# Patient Record
Sex: FEMALE | Race: WHITE | Employment: UNEMPLOYED | URBAN - METROPOLITAN AREA
[De-identification: names, ages, dates, MRNs, and addresses within clinical notes are randomized per-mention and may not be internally consistent; named-entity substitution may affect disease eponyms.]

---

## 2017-08-20 ENCOUNTER — HOSPITAL ENCOUNTER (EMERGENCY)
Age: 22
Discharge: HOME | End: 2017-08-20
Attending: EMERGENCY MEDICINE
Payer: COMMERCIAL

## 2017-08-20 VITALS
DIASTOLIC BLOOD PRESSURE: 80 MMHG | WEIGHT: 157.4 LBS | HEART RATE: 85 BPM | RESPIRATION RATE: 18 BRPM | HEIGHT: 68 IN | BODY MASS INDEX: 23.86 KG/M2 | OXYGEN SATURATION: 100 % | TEMPERATURE: 98.7 F | SYSTOLIC BLOOD PRESSURE: 142 MMHG

## 2017-08-20 DIAGNOSIS — Z71.1 NO ABNORMALITY DETECTED ON MENTAL HEALTH ASSESSMENT: Primary | ICD-10-CM

## 2017-08-20 PROCEDURE — 75810000275 HC EMERGENCY DEPT VISIT NO LEVEL OF CARE

## 2017-08-20 PROCEDURE — 99284 EMERGENCY DEPT VISIT MOD MDM: CPT

## 2017-08-20 PROCEDURE — 90791 PSYCH DIAGNOSTIC EVALUATION: CPT
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
## 2017-08-21 NOTE — ED NOTES
MD reviewed discharge instructions with the patient. The patient verbalized understanding. Patient ambulated out of ED with [facility].  No complaints or concerns noted
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
## 2017-08-21 NOTE — DISCHARGE INSTRUCTIONS
We hope that we have addressed all of your medical concerns. The examination and treatment you received in the Emergency Department were for an emergent problem and were not intended as complete care. It is important that you follow up with your healthcare provider(s) for ongoing care. If your symptoms worsen or do not improve as expected, and you are unable to reach your usual health care provider(s), you should return to the Emergency Department. Today's healthcare is undergoing tremendous change, and patient satisfaction surveys are one of the many tools to assess the quality of medical care. You may receive a survey from the [employer] regarding your experience in the Emergency Department. I hope that your experience has been completely positive, particularly the medical care that I provided. As such, please participate in the survey; anything less than excellent does not meet my expectations or intentions. [facility] and [address] [place] [address] participate in nationally recognized quality of care measures. If your blood pressure is greater than 120/80, as reported below, we urge that you seek medical care to address the potential of high blood pressure, commonly known as hypertension. Hypertension can be hereditary or can be caused by certain medical conditions, pain, stress, or \"white coat syndrome. \"       Please make an appointment with your health care provider(s) for follow up of your Emergency Department visit. VITALS:   Patient Vitals for the past 8 hrs:   Temp Pulse Resp BP SpO2   08/20/17 2206 98.7 °F (37.1 °C) 85 18 142/80 100 %   08/20/17 1918 98.3 °F (36.8 °C) 84 18 (!) 145/94 100 %          Thank you for allowing us to provide you with medical care today. We realize that you have many choices for your emergency care needs. Please choose us in the future for any continued health care needs.       Dorothy Chapa Ritesh James M.D. [address] [facility],[address] [facility]: [phone]            No results found for this or any previous visit (from the past 24 hour(s)). No results found.

## 2017-08-31 ENCOUNTER — HOSPITAL ENCOUNTER (EMERGENCY)
Age: 22
Discharge: HOME | End: 2017-08-31
Attending: EMERGENCY MEDICINE
Payer: COMMERCIAL

## 2017-08-31 VITALS
DIASTOLIC BLOOD PRESSURE: 88 MMHG | OXYGEN SATURATION: 99 % | WEIGHT: 154 LBS | SYSTOLIC BLOOD PRESSURE: 144 MMHG | BODY MASS INDEX: 24.17 KG/M2 | HEIGHT: 67 IN | TEMPERATURE: 98 F | RESPIRATION RATE: 15 BRPM | HEART RATE: 82 BPM

## 2017-08-31 DIAGNOSIS — Z65.8 DOMESTIC PROBLEMS: Primary | ICD-10-CM

## 2017-08-31 PROCEDURE — 99283 EMERGENCY DEPT VISIT LOW MDM: CPT

## 2017-08-31 PROCEDURE — 90791 PSYCH DIAGNOSTIC EVALUATION: CPT

## 2017-08-31 NOTE — ED TRIAGE NOTES
\"My mother and the father of my two girls want me to come and have my brain evaluated. They will not let me have my children, they keep telling me I am insane and unstable. Here I am to proove once again that I am not crazy so I can have my children. \" Pt escorted to ED by [employer], called by pt's mother. Pt denies any s/sx. Pt reports Suicidal Ideation, no plans to act on thoughts. Denies HI.

## 2017-08-31 NOTE — ED NOTES
4:08 PM  Change of shift. Care of patient taken over from Laith Morgan MD; H&P reviewed, handoff complete to Dr. Carlos Florescal. Awaiting consultant and resources will be given to Pt.    4:36 PM  Case Management has seen Pt. Agrees with [facility] that Pt can be discharged. Pt is requesting to be discharged as well.
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
## 2017-08-31 NOTE — DISCHARGE INSTRUCTIONS
Codependency: Care Instructions  Your Care Instructions  Codependency happens when you are greatly affected by a loved one's or another family member's substance abuse or addiction. You may feel that you can control the actions of another person through your will power. Since this cannot be done, you feel hopeless, betrayed, or angry. Addiction is a disease. A person with an addiction is not choosing a drug or alcohol instead of friends or family. The person needs professional help to deal with the addiction. The best thing you can do is to help the person get the help he or she needs. Also, it is very important for you to take care of yourself. Making sure you get the understanding and respect you need will help you stay healthy in both physical and emotional ways. Follow-up care is a key part of your treatment and safety. Be sure to make and go to all appointments, and call your doctor if you are having problems. Its also a good idea to know your test results and keep a list of the medicines you take. How can you care for yourself at home? · Learn all you can about codependency. Being informed can help you deal with the problem. · Do not blame yourself for your family member's condition. · Find a counselor you like and trust. Talk openly and honestly about your problems. Be willing to make some changes. · Go to all counseling sessions. Do not skip any just because you are feeling better. · Talk to friends and family for emotional support. · Make a plan with all family members about how to take care of your loved one when symptoms of his or her addiction are bad. · Do not focus attention only on the family member who has the problem. · Get enough rest. When you are too tired, it can be hard to cope with even small problems. · Eat a healthy, balanced diet to help prevent illness. · Get at least 30 minutes of exercise on most days of the week. Walking is a good choice.  You also may want to do other activities, such as running, swimming, cycling, or playing tennis or team sports. Exercise can help you relieve stress and feel better. · Stay active. Try to do the things you usually enjoy, even if you do not feel like doing them. · Join a support group for family members, such as [employer]. Talking with other people who are going through the same things can help. When should you call for help? Watch closely for changes in your health, and be sure to contact your doctor if:  · You cannot concentrate or are easily confused. · You have trouble taking care of yourself. · You cannot go to your counseling sessions. · You feel sad or depressed. Where can you learn more? Go to http://katerine-brenna.info/. Enter D324 in the search box to learn more about \"Codependency: Care Instructions. \"  Current as of: July 26, 2016  Content Version: 11.3  © 3119-6300 [employer]. Care instructions adapted under license by [employer] (which disclaims liability or warranty for this information). If you have questions about a medical condition or this instruction, always ask your healthcare professional. [place] [address] any warranty or liability for your use of this information. We hope that we have addressed all of your medical concerns. The examination and treatment you received in the Emergency Department were for an emergent problem and were not intended as complete care. It is important that you follow up with your healthcare provider(s) for ongoing care. If your symptoms worsen or do not improve as expected, and you are unable to reach your usual health care provider(s), you should return to the Emergency Department. Today's healthcare is undergoing tremendous change, and patient satisfaction surveys are one of the many tools to assess the quality of medical care.   You may receive a survey from the [employer] regarding your experience in the Emergency Department. I hope that your experience has been completely positive, particularly the medical care that I provided. As such, please participate in the survey; anything less than excellent does not meet my expectations or intentions. [address] [facility] and [employer] participate in nationally recognized quality of care measures. If your blood pressure is greater than 120/80, as reported below, we urge that you seek medical care to address the potential of high blood pressure, commonly known as hypertension. Hypertension can be hereditary or can be caused by certain medical conditions, pain, stress, or \"white coat syndrome. \"       Please make an appointment with your health care provider(s) for follow up of your Emergency Department visit. VITALS:   Patient Vitals for the past 8 hrs:   Temp Pulse Resp BP SpO2   08/31/17 1259 98.5 °F (36.9 °C) 84 16 129/89 94 %          Thank you for allowing us to provide you with medical care today. We realize that you have many choices for your emergency care needs. Please choose us in the future for any continued health care needs. Kushal Mondragon [address], [address]: [phone]            No results found for this or any previous visit (from the past 24 hour(s)). No results found.
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
## 2017-09-15 ENCOUNTER — HOSPITAL ENCOUNTER (INPATIENT)
Age: 22
LOS: 1 days | Discharge: LEFT BEFORE DISCHARGE | DRG: 781 | End: 2017-09-16
Attending: PSYCHIATRY & NEUROLOGY | Admitting: PSYCHIATRY & NEUROLOGY
Payer: COMMERCIAL

## 2017-09-15 PROBLEM — F32.9 MAJOR DEPRESSION: Status: ACTIVE | Noted: 2017-09-15

## 2017-09-15 PROCEDURE — 65220000003 HC RM SEMIPRIVATE PSYCH

## 2017-09-15 RX ORDER — ESCITALOPRAM OXALATE 10 MG/1
5 TABLET ORAL DAILY
Status: ON HOLD | COMMUNITY
End: 2017-09-15

## 2017-09-15 RX ORDER — IBUPROFEN 400 MG/1
400 TABLET ORAL
Status: DISCONTINUED | OUTPATIENT
Start: 2017-09-15 | End: 2017-09-16 | Stop reason: HOSPADM

## 2017-09-15 RX ORDER — ESCITALOPRAM OXALATE 5 MG/1
5 TABLET ORAL DAILY
COMMUNITY
End: 2018-03-30

## 2017-09-15 RX ORDER — ADHESIVE BANDAGE
30 BANDAGE TOPICAL DAILY PRN
Status: DISCONTINUED | OUTPATIENT
Start: 2017-09-15 | End: 2017-09-16 | Stop reason: HOSPADM

## 2017-09-15 RX ORDER — IBUPROFEN 200 MG
1 TABLET ORAL
Status: DISCONTINUED | OUTPATIENT
Start: 2017-09-15 | End: 2017-09-16 | Stop reason: HOSPADM

## 2017-09-15 NOTE — IP AVS SNAPSHOT
[address] 
[phone] Patient: Anette Sanders MRN: [id number] NQM:[id number]/76/0443 Current Discharge Medication List  
  
ASK your doctor about these medications Dose & Instructions Dispensing Information Comments Morning Noon Evening Bedtime LEXAPRO 5 mg tablet Generic drug:  escitalopram oxalate Your last dose was: Your next dose is:    
   
   
 Dose:  5 mg Take 5 mg by mouth daily. Indications: ANXIETY WITH DEPRESSION Refills:  0

## 2017-09-15 NOTE — BH NOTES
TRANSFER - IN REPORT:    Verbal report received from Evin Pepe(name) on Joni Leiva  being received from [employer]) for routine progression of care      Report consisted of patients Situation, Background, Assessment and   Recommendations(SBAR). Information from the following report(s) ED Summary was reviewed with the receiving nurse. Opportunity for questions and clarification was provided. Assessment completed upon patients arrival to unit and care assumed.

## 2017-09-15 NOTE — IP AVS SNAPSHOT
[address] [place] [phone] Patient: Conner Cruz MRN: [id number] ZKY:[birth year] You are allergic to the following No active allergies Recent Documentation Weight Breastfeeding? BMI OB Status Smoking Status 70.6 kg No 24.37 kg/m2 Having regular periods Heavy Tobacco Smoker Emergency Contacts Name Discharge Info Relation Home Work Mobile None,None DECLINED CAREGIVER [4] Unknown [9] [phone] About your hospitalization You were admitted on:  September 15, 2017 You last received care in the:  [address] [place] [address] You were discharged on:  2017 Unit phone number:  [phone] Why you were hospitalized Your primary diagnosis was:  Not on File Your diagnoses also included: Major Depression Providers Seen During Your Hospitalizations Provider Role Specialty Primary office phone Joey Cook MD Attending Provider Psychiatry [phone] Your Primary Care Physician (PCP) Primary Care Physician Office Phone Office Fax NONE ** None ** ** None ** Follow-up Information Follow up With Details Comments Contact [facility] On 2017 Call Monday for intake appt : 9:30 am to 5:00 pm  [address] [place] [id number] 
[phone] 
24 - HR  [phone] Current Discharge Medication List  
  
ASK your doctor about these medications Dose & Instructions Dispensing Information Comments Morning Noon Evening Bedtime LEXAPRO 5 mg tablet Generic drug:  escitalopram oxalate Your last dose was: Your next dose is:    
   
   
 Dose:  5 mg Take 5 mg by mouth daily. Indications: ANXIETY WITH DEPRESSION Refills:  0 Discharge Instructions DISCHARGE SUMMARY 
 
NAME:Pascale Charlton : 1995 MRN: [id number] The patient Abby Mendoza exhibits the ability to control behavior in a less restrictive environment. Patient's level of functioning is improving. No assaultive/destructive behavior has been observed for the past 24 hours. No suicidal/homicidal threat or behavior has been observed for the past 24 hours. There is no evidence of serious medication side effects. Patient has not been in physical or protective restraints for at least the past 24 hours. If weapons involved, how are they secured? N/A Is patient aware of and in agreement with discharge plan? Yes Arrangements for medication:  Prescriptions were not  given to patient. Copy of discharge instructions to  provider?:  N/A Arrangements for transportation home:  Mother has agreed to provide transport Keep all follow up appointments as scheduled, continue to take prescribed medications per physician instructions. Mental health crisis number:  246 or your local mental health crisis line number at [address] [place] [address]  @ [phone] DISCHARGE SUMMARY from Nurse The following personal items are in your possession at time of discharge: 
 
Dental Appliances: None Visual Aid: None Home Medications: None Jewelry: Bracelet, Necklace (3 bracelets, 2 necklaces) Clothing: Footwear, Pajamas, Pants, Shirt, Undergarments, Sweater Other Valuables: Cigarettes, Lighter/matches, Money (comment), Personal toiletries, Purse, Wallet, Other (comment) (brush, 2 toiletries in box in prince closet) Personal Items Sent to Safe:  (debit card, passport, etc) PATIENT INSTRUCTIONS: 
 
What to do at Home: 
Recommended activity: Activity as tolerated.  
 
If you experience any of the following symptoms:  Overwhelming anxiety or depression, thoughts of hurting yourself or others, please follow up with 911 or your local mental health crisis line number at [facility] [phone] Maria[facility] @ [phone] *  Please give a list of your current medications to your Primary Care Provider. *  Please update this list whenever your medications are discontinued, doses are 
    changed, or new medications (including over-the-counter products) are added. *  Please carry medication information at all times in case of emergency situations. These are general instructions for a healthy lifestyle: No smoking/ No tobacco products/ Avoid exposure to second hand smoke Surgeon General's Warning:  Quitting smoking now greatly reduces serious risk to your health. Obesity, smoking, and sedentary lifestyle greatly increases your risk for illness A healthy diet, regular physical exercise & weight monitoring are important for maintaining a healthy lifestyle You may be retaining fluid if you have a history of heart failure or if you experience any of the following symptoms:  Weight gain of 3 pounds or more overnight or 5 pounds in a week, increased swelling in our hands or feet or shortness of breath while lying flat in bed. Please call your doctor as soon as you notice any of these symptoms; do not wait until your next office visit. Recognize signs and symptoms of STROKE: 
 
F-face looks uneven A-arms unable to move or move unevenly S-speech slurred or non-existent T-time-call 911 as soon as signs and symptoms begin-DO NOT go Back to bed or wait to see if you get better-TIME IS BRAIN. Warning Signs of HEART ATTACK Call 911 if you have these symptoms: 
? Chest discomfort. Most heart attacks involve discomfort in the center of the chest that lasts more than a few minutes, or that goes away and comes back. It can feel like uncomfortable pressure, squeezing, fullness, or pain. ? Discomfort in other areas of the upper body. Symptoms can include pain or discomfort in one or both arms, the back, neck, jaw, or stomach. ? Shortness of breath with or without chest discomfort. ? Other signs may include breaking out in a cold sweat, nausea, or lightheadedness. Don't wait more than five minutes to call [address]! Fast action can save your life. Calling 911 is almost always the fastest way to get lifesaving treatment. Emergency Medical Services staff can begin treatment when they arrive  up to an hour sooner than if someone gets to the hospital by car. The discharge information has been reviewed with the patient. The patient verbalized understanding. Discharge medications reviewed with the patient and appropriate educational materials and side effects teaching were provided. Discharge Orders None Introducing [facility]! [facility] introduces BI[employer]AFE patient portal. Now you can access parts of your medical record, email your doctor's office, and request medication refills online. 1. In your internet browser, go to [email or web address] 2. Click on the First Time User? Click Here link in the Sign In box. You will see the New Member Sign Up page. 3. Enter your BI[employer]AFE Access Code exactly as it appears below. You will not need to use this code after youve completed the sign-up process. If you do not sign up before the expiration date, you must request a new code. · BI[employer]E Access Code: [email or web address] Expires: 11/18/2017  7:26 PM 
 
4. Enter the last four digits of your Social Security Number (xxxx) and Date of Birth (mm/dd/yyyy) as indicated and click Submit. You will be taken to the next sign-up page. 5. Create a Page[employer] ID. This will be your BIOSAFE login ID and cannot be changed, so think of one that is secure and easy to remember. 6. Create a [employer]E password. You can change your password at any time. 7. Enter your Password Reset Question and Answer. This can be used at a later time if you forget your password. 8. Enter your e-mail address. You will receive e-mail notification when new information is available in [address] 9. Click Sign Up. You can now view and download portions of your medical record. 10. Click the Download Summary menu link to download a portable copy of your medical information. If you have questions, please visit the Frequently Asked Questions section of the [employer] website. Remember, [employer] is NOT to be used for urgent needs. For medical emergencies, dial 911. Now available from your iPhone and Android! General Information Please provide this summary of care documentation to your next provider. Patient Signature:  ____________________________________________________________ Date:  ____________________________________________________________  
  
Anthony Snipe Provider Signature:  ____________________________________________________________ Date:  ____________________________________________________________

## 2017-09-15 NOTE — IP AVS SNAPSHOT
Summary of Care Report The Summary of Care report has been created to help improve care coordination. Users with access to [employer] or [address] (Web-based application) may access additional patient information including the Discharge Summary. If you are not currently a [address] user and need more information, please call the number listed below in the [place] [address] section and ask to be connected with Medical Records. Facility Information Name Address Phone [address] [facility] 7 [address] [phone] Patient Information Patient Name Sex LILY Pena ([id number]) Female 1995 Discharge Information Admitting Provider Service Area Unit Guanako Greene MD / [id number] / [phone] Discharge Provider Discharge Date/Time Discharge Disposition Destination (none) (none) (none) (none) Patient Language Language ENGLISH [13] Hospital Problems as of 2017  Reviewed: 10/8/2012  2:59 PM by Samantha Dyson MD  
  
  
  
 Class Noted - Resolved Last Modified POA Active Problems Major depression  9/15/2017 - Present 9/15/2017 by Guanako Greene MD Unknown Entered by Guanako Greene MD  
  
Non-Hospital Problems as of 2017  Reviewed: 10/8/2012  2:59 PM by Samantha Dyson MD  
  
  
  
 Class Noted - Resolved Last Modified Active Problems IUGR (intrauterine growth restriction)  2012 - Present 2012 Entered by Rolando Wheat MD  
  Rh negative status during pregnancy, antepartum  2012 - Present 2012 Entered by Rolando Wheat MD  
  Retained placenta  8/10/2012 - Present 8/10/2012   Entered by Adam Hernández MD  
  Pregnancy  3/25/2015 - Present 3/25/2015 by German Felder MD  
  Entered by German Felder MD  
  
 You are allergic to the following No active allergies Current Discharge Medication List  
  
ASK your doctor about these medications Dose & Instructions Dispensing Information Comments LEXAPRO 5 mg tablet Generic drug:  escitalopram oxalate Dose:  5 mg Take 5 mg by mouth daily. Indications: ANXIETY WITH DEPRESSION Refills:  0 Current Immunizations Name Date  
 TB Skin Test (PPD) Intradermal 3/8/2013 TDAP Vaccine 2007 Follow-up Information Follow up With Details Comments Contact Info [facility] [place] [facility] 2017 Call Monday for intake appt : 9:30 am to 5:00 pm  [address] [place] [id number] 
[phone] 
24 - HR  [phone] Discharge Instructions DISCHARGE SUMMARY 
 
NAME:Pascale Charlton : 1995 MRN: [id number] The patient Alise Souza exhibits the ability to control behavior in a less restrictive environment. Patient's level of functioning is improving. No assaultive/destructive behavior has been observed for the past 24 hours. No suicidal/homicidal threat or behavior has been observed for the past 24 hours. There is no evidence of serious medication side effects. Patient has not been in physical or protective restraints for at least the past 24 hours. If weapons involved, how are they secured? N/A Is patient aware of and in agreement with discharge plan? Yes Arrangements for medication:  Prescriptions were not  given to patient. Copy of discharge instructions to  provider?:  N/A Arrangements for transportation home:  Mother has agreed to provide transport Keep all follow up appointments as scheduled, continue to take prescribed medications per physician instructions. Mental health crisis number:  989 or your local mental health crisis line number at [address]  @ [phone] DISCHARGE SUMMARY from Nurse The following personal items are in your possession at time of discharge: 
 
Dental Appliances: None Visual Aid: None Home Medications: None Jewelry: Bracelet, Necklace (3 bracelets, 2 necklaces) Clothing: Footwear, Pajamas, Pants, Shirt, Undergarments, Sweater Other Valuables: Cigarettes, Lighter/matches, Money (comment), Personal toiletries, Purse, Wallet, Other (comment) (brush, 2 toiletries in box in prince closet) Personal Items Sent to Safe:  (debit card, passport, etc) PATIENT INSTRUCTIONS: 
 
What to do at Home: 
Recommended activity: Activity as tolerated. If you experience any of the following symptoms:  Overwhelming anxiety or depression, thoughts of hurting yourself or others, please follow up with [facility] or your local mental health crisis line number at [address] [place] @ [phone] *  Please give a list of your current medications to your Primary Care Provider. *  Please update this list whenever your medications are discontinued, doses are 
    changed, or new medications (including over-the-counter products) are added. *  Please carry medication information at all times in case of emergency situations. These are general instructions for a healthy lifestyle: No smoking/ No tobacco products/ Avoid exposure to second hand smoke Surgeon General's Warning:  Quitting smoking now greatly reduces serious risk to your health. Obesity, smoking, and sedentary lifestyle greatly increases your risk for illness A healthy diet, regular physical exercise & weight monitoring are important for maintaining a healthy lifestyle You may be retaining fluid if you have a history of heart failure or if you experience any of the following symptoms:  Weight gain of 3 pounds or more overnight or 5 pounds in a week, increased swelling in our hands or feet or shortness of breath while lying flat in bed. Please call your doctor as soon as you notice any of these symptoms; do not wait until your next office visit. Recognize signs and symptoms of STROKE: 
 
F-face looks uneven A-arms unable to move or move unevenly S-speech slurred or non-existent T-time-call 911 as soon as signs and symptoms begin-DO NOT go Back to bed or wait to see if you get better-TIME IS BRAIN. Warning Signs of HEART ATTACK Call 911 if you have these symptoms: 
? Chest discomfort. Most heart attacks involve discomfort in the center of the chest that lasts more than a few minutes, or that goes away and comes back. It can feel like uncomfortable pressure, squeezing, fullness, or pain. ? Discomfort in other areas of the upper body. Symptoms can include pain or discomfort in one or both arms, the back, neck, jaw, or stomach. ? Shortness of breath with or without chest discomfort. ? Other signs may include breaking out in a cold sweat, nausea, or lightheadedness. Don't wait more than five minutes to call [address]! Fast action can save your life. Calling 911 is almost always the fastest way to get lifesaving treatment. Emergency Medical Services staff can begin treatment when they arrive  up to an hour sooner than if someone gets to the hospital by car. The discharge information has been reviewed with the patient. The patient verbalized understanding. Discharge medications reviewed with the patient and appropriate educational materials and side effects teaching were provided. Chart Review Routing History Recipient Method Report Sent By Robel Cazares MD  
Phone: [phone] In [place] IP Auto Routed Notes Huber Dela Cruz MD [[id number]] 3/19/2015  5:12 PM 03/19/2015 Judith Cazares MD  
Phone: [phone] In [place] IP Auto Routed Notes Huber Dela Cruz MD [[id number]] 3/23/2015  7:57 AM 03/23/2015 Marcellus Hussein MD  
Phone: [phone] In Basket IP Auto Routed Notes Deepika Goyal MD [[id number]] 3/25/2015  9:42 AM 03/25/2015 Marcellus Hussein MD  
Phone: [phone] In Basket IP Auto Routed Notes Sandy Epperson MD [[id number]] 3/27/2015  3:37 PM 03/27/2015 Joey Cook MD  
Phone: [phone] In Basket IP Auto Routed Aubree Fink MD [[id number]] 9/16/2017  6:32 AM 09/16/2017

## 2017-09-16 VITALS
OXYGEN SATURATION: 98 % | DIASTOLIC BLOOD PRESSURE: 73 MMHG | RESPIRATION RATE: 16 BRPM | SYSTOLIC BLOOD PRESSURE: 118 MMHG | HEART RATE: 61 BPM | TEMPERATURE: 97.8 F | WEIGHT: 155.6 LBS | BODY MASS INDEX: 24.37 KG/M2

## 2017-09-16 PROBLEM — F43.21 ADJUSTMENT DISORDER WITH DEPRESSED MOOD: Status: ACTIVE | Noted: 2017-09-16

## 2017-09-16 PROCEDURE — 74011250637 HC RX REV CODE- 250/637: Performed by: PSYCHIATRY & NEUROLOGY

## 2017-09-16 RX ADMIN — IBUPROFEN 400 MG: 400 TABLET, FILM COATED ORAL at 10:54

## 2017-09-16 NOTE — PROGRESS NOTES
PRN Medication Documentation    Specific patient behavior that led to need for PRN medication: pt c/o 7/10 cramping discomfort to lower abdominal area   Staff interventions attempted prior to PRN being given: education, food, oral fluid, rest  PRN medication given: po 400 mg Motrin   Patient response/effectiveness of PRN medication: pt alert and oriented anxious

## 2017-09-16 NOTE — DISCHARGE INSTRUCTIONS
DISCHARGE SUMMARY    Huang Last  : 1995  MRN: [id number]    The patient Odin Irene exhibits the ability to control behavior in a less restrictive environment. Patient's level of functioning is improving. No assaultive/destructive behavior has been observed for the past 24 hours. No suicidal/homicidal threat or behavior has been observed for the past 24 hours. There is no evidence of serious medication side effects. Patient has not been in physical or protective restraints for at least the past 24 hours. If weapons involved, how are they secured? N/A    Is patient aware of and in agreement with discharge plan? Yes    Arrangements for medication:  Prescriptions were not  given to patient. Copy of discharge instructions to  provider?:  N/A    Arrangements for transportation home:  Mother has agreed to provide transport    Keep all follow up appointments as scheduled, continue to take prescribed medications per physician instructions. Mental health crisis number:  [phone] or your local mental health crisis line number at [address] [facility] [address] [facility] @ [facility] 97 from Nurse    The following personal items are in your possession at time of discharge:    Dental Appliances: None  Visual Aid: None     Home Medications: None  Jewelry: Bracelet, Necklace (3 bracelets, 2 necklaces)  Clothing: Footwear, Pajamas, Pants, Shirt, Undergarments, Sweater  Other Valuables: Cigarettes, Lighter/matches, Money (comment), Personal toiletries, Purse, Wallet, Other (comment) (brush, 2 toiletries in box in prince closet)  Personal Items Sent to Safe:  (debit card, passport, etc)          PATIENT INSTRUCTIONS:    What to do at Home:  Recommended activity: Activity as tolerated.     If you experience any of the following symptoms:  Overwhelming anxiety or depression, thoughts of hurting yourself or others, please follow up with 911 or your local mental health crisis line number at [address] [facility] @ [phone]        *  Please give a list of your current medications to your Primary Care Provider. *  Please update this list whenever your medications are discontinued, doses are      changed, or new medications (including over-the-counter products) are added. *  Please carry medication information at all times in case of emergency situations. These are general instructions for a healthy lifestyle:    No smoking/ No tobacco products/ Avoid exposure to second hand smoke    Surgeon General's Warning:  Quitting smoking now greatly reduces serious risk to your health. Obesity, smoking, and sedentary lifestyle greatly increases your risk for illness    A healthy diet, regular physical exercise & weight monitoring are important for maintaining a healthy lifestyle    You may be retaining fluid if you have a history of heart failure or if you experience any of the following symptoms:  Weight gain of 3 pounds or more overnight or 5 pounds in a week, increased swelling in our hands or feet or shortness of breath while lying flat in bed. Please call your doctor as soon as you notice any of these symptoms; do not wait until your next office visit. Recognize signs and symptoms of STROKE:    F-face looks uneven    A-arms unable to move or move unevenly    S-speech slurred or non-existent    T-time-call 911 as soon as signs and symptoms begin-DO NOT go       Back to bed or wait to see if you get better-TIME IS BRAIN. Warning Signs of HEART ATTACK     Call 911 if you have these symptoms:   Chest discomfort. Most heart attacks involve discomfort in the center of the chest that lasts more than a few minutes, or that goes away and comes back. It can feel like uncomfortable pressure, squeezing, fullness, or pain.  Discomfort in other areas of the upper body.  Symptoms can include pain or discomfort in one or both arms, the back, neck, jaw, or stomach.  Shortness of breath with or without chest discomfort.  Other signs may include breaking out in a cold sweat, nausea, or lightheadedness. Don't wait more than five minutes to call 911 - MINUTES MATTER! Fast action can save your life. Calling 911 is almost always the fastest way to get lifesaving treatment. Emergency Medical Services staff can begin treatment when they arrive -- up to an hour sooner than if someone gets to the hospital by car. The discharge information has been reviewed with the patient. The patient verbalized understanding. Discharge medications reviewed with the patient and appropriate educational materials and side effects teaching were provided.

## 2017-09-16 NOTE — BH NOTES
Patient arrived on the unit cooperative but tearful. She states she just found out she is pregnant at [place] today. She provided necessary information and went to bed. Talked to Dr. Nanette Jones and his orders were the [address] set with no Psy. Medications and no tylenol because patient is 5 weeks pregnant.

## 2017-09-16 NOTE — PROGRESS NOTES
Patient reviewed and signed discharge instructions and AMA. Patient was discharged with instructions, all personal belongings and escorted to the door with her mother. Patient did not receive any psych medications while here and was not discharged with medications.

## 2017-09-16 NOTE — BH NOTES
DISCHARGE SUMMARY    Liyah Llamas  : 1995  MRN: [id number]    The patient Dillon Yates exhibits the ability to control behavior in a less restrictive environment. Patient's level of functioning is improving. No assaultive/destructive behavior has been observed for the past 24 hours. No suicidal/homicidal threat or behavior has been observed for the past 24 hours. There is no evidence of serious medication side effects. Patient has not been in physical or protective restraints for at least the past 24 hours. If weapons involved, how are they secured? N/A    Is patient aware of and in agreement with discharge plan? Yes    Arrangements for medication:  Prescriptions were not  given to patient. Copy of discharge instructions to  provider?:  N/A    Arrangements for transportation home:  Mother has agreed to provide transport    Keep all follow up appointments as scheduled, continue to take prescribed medications per physician instructions.   Mental health crisis number:  [phone] or your local mental health crisis line number at [address] [facility] [address]  [facility] @ Pooja Burk from Nurse

## 2017-09-16 NOTE — BH NOTES
Primary Nurse Vicente Mercer RN and Ed Negrete, RN performed a dual skin assessment on this patient Impairment noted-Patient has an abdominal scar from to deliveries and very bad acne on her back.   Narendra score is 23

## 2017-09-16 NOTE — BH NOTES
[facility]  Master Treatment Plan for Van Held    Date Treatment Plan Initiated: 09/16/17    Treatment Plan Modalities:  Type of Modality Amount  (x minutes) Frequency (x/week) Duration (x days) Name of Responsible Staff   Community & wrap-up meetings to encourage peer interactions 15 7 1 Holly, [facility] psychotherapy to assist in building coping skills and internal controls 60 7 1 Tayler Bob LCSW   Therapeutic activity groups to build coping skills 61 7 1 Tayler Bob LCSW   Psychoeducation in group setting to address:   Medication education   15 7 1 Shawn Gibson, RN   Coping skills         Relaxation techniques         Symptom management         Discharge planning         Spirituality    61 2 1 Chaplain HEWITT   60 1 1 Volunteer   Recovery/AA/NA   60 3 1 Volunteer   Physician medication management   15 7 1 Dr. Terra Garces   Family meeting/discharge planning                                        Problem: Depressed Mood (Adult/Pediatric)  Goals will be met by 09/24/17  Goal: *STG: Participates in treatment plan  Outcome: Progressing Towards Goal  Patient participates in treatment team    Goal: *STG: Remains safe in hospital  Outcome: Progressing Towards Goal  Patient remains safe in hospital     Problem: Falls - Risk of   Goal will be met by 09/24/17  Goal: *Absence of Falls  Document Griffin Fall Risk and appropriate interventions in the flowsheet.    Outcome: Progressing Towards Goal  Fall Risk Interventions:     Patient is absent of falls.    Medication Interventions: Teach patient to arise slowly

## 2017-09-16 NOTE — PROGRESS NOTES
Problem: Depressed Mood (Adult/Pediatric)  Goal: *STG: Participates in treatment plan  Outcome: Progressing Towards Goal  Patient participates in treatment team, animated, sad affect, anxious, cooperative, organized thoughts, good insight, med and meal compliant. Treatment and discharge discussed. Remains on Q15 min safety checks. Goal: *STG: Remains safe in hospital  Outcome: Progressing Towards Goal  Patient remains safe in hospital    Problem: Falls - Risk of  Goal: *Absence of Falls  Document Griffin Fall Risk and appropriate interventions in the flowsheet. Outcome: Progressing Towards Goal  Fall Risk Interventions:         Patient is absent of falls.      Medication Interventions: Teach patient to arise slowly

## 2017-09-16 NOTE — BH NOTES
During initial rounds patient was in bed asleep with respirations even and unlabored as chest was rising and falling. Will continue to monitor throughout shift.
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
## 2017-09-16 NOTE — INTERDISCIPLINARY ROUNDS
Behavioral Health Interdisciplinary Rounds     Patient Name: Conner Cruz  Age: 23 y.o.   Room/Bed:  731/02  Primary Diagnosis: <principal problem not specified>   Admission Status : Voluntary     Readmission within 30 days: no  Power of  in place: no  Patient requires a blocked bed: no          Reason for blocked bed:     VTE Prophylaxis: Not indicated  Flu vaccine given : no   Mobility needs/Fall risk: no    Nutritional Plan: no  Consults: none         Labs/Testing due today?: no    Sleep hours: 8.50       Participation in Care/Groups:  no  Medication Compliant?:N/A  PRNS (last 24 hours): None    Restraints (last 24 hours):  no  Substance Abuse:  yes  CIWA (range last 24 hours):  COWS (range last 24 hours):   Alcohol screening (AUDIT) completed -  AUDIT Score: 6  If applicable, date SBIRT discussed in treatment team AND documented:   Tobacco - patient is a smoker: yes   Date tobacco education completed by RN:   24 hour chart check complete: no new admit    Patient goal(s) for today: pt requested to be d/c   Treatment team focus/goals: Complete psychosocial assessment and eval for discharge  Progress note     LOS:  1  Expected LOS:     Financial concerns/prescription coverage:    Date of last family contact: SW contacted mother Ralf Thorne @ [phone]) to develop safety plan for tentative discharge  Family requesting physician contact today:  no  Discharge plan: D/C to mother's home  Guns in the home:  No      Outpatient provider(s): Link to [facility]    Participating treatment team members: Conner Cruz, Dr. Dillon Wan RN and Jose Elise
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
## 2017-09-16 NOTE — CONSULTS
[facility] [address] [facility]   [address] [facility]       Name:  Kelsey Fulton   MR#:  [id number]   :  1995   Account #:  [de-identified]    Date of Consultation:  09/15/2017   Date of Adm:  09/15/2017       PRIMARY CARE PHYSICIAN: None. REQUESTING PHYSICIAN: Dr. Pito Khan, Psychiatry    REASON FOR MEDICAL CONSULT: Routine history and physical   required for admission into the psychiatric unit. SOURCE OF INFORMATION: The patient. CHIEF COMPLAINT: None. HISTORY OF PRESENT ILLNESS: This is a 29-year-old woman   without any significant past medical history, who was admitted to the   psychiatric unit for evaluation and treatment of major depression. Medical consult was requested for routine history and physical required   for admission into the psychiatric unit. The patient has no specific   medical complaints at this time. She denies fever, rigors, and chills. The patient has no fever, no shortness of breath. PAST MEDICAL HISTORY: Not significant. ALLERGIES: NO KNOWN DRUG ALLERGIES. MEDICATIONS PRIOR TO ADMISSION: Lexapro 5 mg daily. FAMILY HISTORY: This was reviewed. Her mother has hypertension. PAST SURGICAL HISTORY: This is significant for  section. SOCIAL HISTORY: The patient smokes about half a pack of cigarettes   daily. The patient denies alcohol abuse, but admits to use of   marijuana. REVIEW OF SYSTEMS   HEAD, EYES, EARS, NOSE AND THROAT: No headache, no   dizziness, no blurring of vision, no photophobia. RESPIRATORY SYSTEM: No cough, no shortness of breath, no   hemoptysis. CARDIOVASCULAR SYSTEM: No chest pain, no orthopnea, no   palpitation. GASTROINTESTINAL SYSTEM: No nausea and vomiting, no   diarrhea, no constipation. GENITOURINARY: No dysuria, no urgency, and no frequency. All other systems are reviewed and they are negative.     PHYSICAL EXAMINATION   GENERAL APPEARANCE: The patient is stable, in no acute distress. VITAL SIGNS: Temperature 98.2, pulse 93, blood pressure 133/90,   respiratory rate 17, oxygen saturation 99% on room air. HEAD: Normocephalic, atraumatic. EYES: Normal eye movements. No redness, no drainage, no   discharge. EARS: Normal external ears with no obvious drainage. NOSE: No deformity, no drainage. MOUTH AND THROAT: No visible oral lesion. NECK: Supple. No JVD. No thyromegaly. CHEST: Clear breath sounds. No wheezing, no crackles. HEART: Normal S1 and S2, regular. No clinically appreciable murmur. ABDOMEN: Soft, nontender, normal bowel sounds. CENTRAL NERVOUS SYSTEM: Alert, oriented x3. No gross focal   neurological deficits. EXTREMITIES: No edema. Pulses 2+ bilaterally. MUSCULOSKELETAL SYSTEM: No obvious joint deformity and   swelling. SKIN: No active skin lesions seen in the exposed parts of the body. PSYCHIATRY: Normal mood and affect. LYMPHATIC SYSTEM: No cervical lymphadenopathy. DIAGNOSTIC DATA: None. LABORATORY DATA: None. ASSESSMENT:   1. Major depression. 2. Tobacco abuse. PLAN:   1. Major depression. The patient will continue evaluation and treatment   for her major depression. This is the main reason why the patient was   admitted to the psychiatric unit. 2. Tobacco abuse. The patient advised to quit smoking. She does not   want to be placed on Nicoderm patch at this time. In summary, this is a 58-year-old woman who was admitted to the   psychiatric unit for evaluation and treatment of her major depression. Medical consult was requested for routine history and physical required   for admission into the psychiatric unit. The patient has no significant   medical issues at this time. We will sign off. Thank you for involving the hospitalist service in the care of this   patient. Less than 30 minutes spent on this medical consult.         Imelda Downs MD      RE / CD   D:  09/16/2017   03:29   T:  09/16/2017   06:13   Job #: [id number]
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
## 2017-10-19 LAB
CHLAMYDIA, EXTERNAL: NEGATIVE
HBSAG, EXTERNAL: NEGATIVE
HIV, EXTERNAL: NONREACTIVE
N. GONORRHEA, EXTERNAL: NEGATIVE
RUBELLA, EXTERNAL: NORMAL

## 2018-02-13 LAB
ANTIBODY SCREEN, EXTERNAL: NEGATIVE
T. PALLIDUM, EXTERNAL: NEGATIVE

## 2018-02-27 ENCOUNTER — HOSPITAL ENCOUNTER (INPATIENT)
Age: 23
LOS: 9 days | Discharge: HOME | DRG: 781 | End: 2018-03-08
Attending: OBSTETRICS & GYNECOLOGY | Admitting: OBSTETRICS & GYNECOLOGY
Payer: COMMERCIAL

## 2018-02-27 LAB
ERYTHROCYTE [DISTWIDTH] IN BLOOD BY AUTOMATED COUNT: 15.4 % (ref 11.5–14.5)
HCT VFR BLD AUTO: 27.2 % (ref 35–47)
HGB BLD-MCNC: 8.5 G/DL (ref 11.5–16)
MCH RBC QN AUTO: 23 PG (ref 26–34)
MCHC RBC AUTO-ENTMCNC: 31.3 G/DL (ref 30–36.5)
MCV RBC AUTO: 73.7 FL (ref 80–99)
NRBC # BLD: 0 K/UL (ref 0–0.01)
NRBC BLD-RTO: 0 PER 100 WBC
PLATELET # BLD AUTO: 185 K/UL (ref 150–400)
PMV BLD AUTO: 11.8 FL (ref 8.9–12.9)
RBC # BLD AUTO: 3.69 M/UL (ref 3.8–5.2)
WBC # BLD AUTO: 9.3 K/UL (ref 3.6–11)

## 2018-02-27 PROCEDURE — 85027 COMPLETE CBC AUTOMATED: CPT | Performed by: OBSTETRICS & GYNECOLOGY

## 2018-02-27 PROCEDURE — 4A1HX4Z MONITORING OF PRODUCTS OF CONCEPTION, CARDIAC ELECTRICAL ACTIVITY, EXTERNAL APPROACH: ICD-10-PCS | Performed by: OBSTETRICS & GYNECOLOGY

## 2018-02-27 PROCEDURE — 36415 COLL VENOUS BLD VENIPUNCTURE: CPT | Performed by: OBSTETRICS & GYNECOLOGY

## 2018-02-27 PROCEDURE — 65270000029 HC RM PRIVATE

## 2018-02-27 PROCEDURE — 74011250636 HC RX REV CODE- 250/636: Performed by: OBSTETRICS & GYNECOLOGY

## 2018-02-27 RX ORDER — ONDANSETRON 4 MG/1
4 TABLET, ORALLY DISINTEGRATING ORAL
Status: DISCONTINUED | OUTPATIENT
Start: 2018-02-27 | End: 2018-03-08 | Stop reason: HOSPADM

## 2018-02-27 RX ORDER — SODIUM CHLORIDE, SODIUM LACTATE, POTASSIUM CHLORIDE, CALCIUM CHLORIDE 600; 310; 30; 20 MG/100ML; MG/100ML; MG/100ML; MG/100ML
75 INJECTION, SOLUTION INTRAVENOUS CONTINUOUS
Status: DISCONTINUED | OUTPATIENT
Start: 2018-02-27 | End: 2018-03-08 | Stop reason: HOSPADM

## 2018-02-27 RX ORDER — SODIUM CHLORIDE 0.9 % (FLUSH) 0.9 %
5-10 SYRINGE (ML) INJECTION AS NEEDED
Status: DISCONTINUED | OUTPATIENT
Start: 2018-02-27 | End: 2018-03-08 | Stop reason: HOSPADM

## 2018-02-27 RX ORDER — ACETAMINOPHEN 325 MG/1
650 TABLET ORAL
Status: DISCONTINUED | OUTPATIENT
Start: 2018-02-27 | End: 2018-03-08 | Stop reason: HOSPADM

## 2018-02-27 RX ORDER — RANITIDINE HCL 75 MG
75 TABLET ORAL 2 TIMES DAILY
COMMUNITY
End: 2018-03-15

## 2018-02-27 RX ORDER — FAMOTIDINE 20 MG/1
20 TABLET, FILM COATED ORAL
Status: DISCONTINUED | OUTPATIENT
Start: 2018-02-27 | End: 2018-03-04

## 2018-02-27 RX ORDER — MAGNESIUM SULFATE HEPTAHYDRATE 40 MG/ML
2 INJECTION, SOLUTION INTRAVENOUS ONCE
Status: COMPLETED | OUTPATIENT
Start: 2018-02-27 | End: 2018-02-27

## 2018-02-27 RX ORDER — MAGNESIUM SULFATE HEPTAHYDRATE 40 MG/ML
4 INJECTION, SOLUTION INTRAVENOUS ONCE
Status: COMPLETED | OUTPATIENT
Start: 2018-02-27 | End: 2018-02-27

## 2018-02-27 RX ORDER — LANOLIN ALCOHOL/MO/W.PET/CERES
325 CREAM (GRAM) TOPICAL DAILY
Status: DISCONTINUED | OUTPATIENT
Start: 2018-02-28 | End: 2018-03-08 | Stop reason: HOSPADM

## 2018-02-27 RX ORDER — SODIUM CHLORIDE 0.9 % (FLUSH) 0.9 %
5-10 SYRINGE (ML) INJECTION EVERY 8 HOURS
Status: DISCONTINUED | OUTPATIENT
Start: 2018-02-27 | End: 2018-03-08 | Stop reason: HOSPADM

## 2018-02-27 RX ORDER — BETAMETHASONE SODIUM PHOSPHATE AND BETAMETHASONE ACETATE 3; 3 MG/ML; MG/ML
12 INJECTION, SUSPENSION INTRA-ARTICULAR; INTRALESIONAL; INTRAMUSCULAR; SOFT TISSUE EVERY 12 HOURS
Status: COMPLETED | OUTPATIENT
Start: 2018-02-27 | End: 2018-02-28

## 2018-02-27 RX ORDER — DIPHENHYDRAMINE HCL 25 MG
25 CAPSULE ORAL
Status: DISCONTINUED | OUTPATIENT
Start: 2018-02-27 | End: 2018-03-08 | Stop reason: HOSPADM

## 2018-02-27 RX ORDER — ZOLPIDEM TARTRATE 5 MG/1
5 TABLET ORAL
Status: DISCONTINUED | OUTPATIENT
Start: 2018-02-27 | End: 2018-03-08 | Stop reason: HOSPADM

## 2018-02-27 RX ADMIN — SODIUM CHLORIDE, SODIUM LACTATE, POTASSIUM CHLORIDE, AND CALCIUM CHLORIDE 75 ML/HR: 600; 310; 30; 20 INJECTION, SOLUTION INTRAVENOUS at 20:00

## 2018-02-27 RX ADMIN — Medication 2 G/HR: at 21:00

## 2018-02-27 RX ADMIN — BETAMETHASONE SODIUM PHOSPHATE AND BETAMETHASONE ACETATE 12 MG: 3; 3 INJECTION, SUSPENSION INTRA-ARTICULAR; INTRALESIONAL; INTRAMUSCULAR at 20:12

## 2018-02-27 RX ADMIN — MAGNESIUM SULFATE HEPTAHYDRATE 2 G: 40 INJECTION, SOLUTION INTRAVENOUS at 20:14

## 2018-02-27 RX ADMIN — MAGNESIUM SULFATE HEPTAHYDRATE 4 G: 40 INJECTION, SOLUTION INTRAVENOUS at 20:38

## 2018-02-27 NOTE — IP AVS SNAPSHOT
[address] 
[phone] Patient: Delores Carson MRN: [id number] JVY:3[birth year]37[birth year]6 About your hospitalization You were admitted on:  2018 You last received care in the:  [facility] [place] 3 LABOR & DELIVERY You were discharged on:  2018 Why you were hospitalized Your primary diagnosis was:  Not on File Your diagnoses also included:  Pregnancy Follow-up Information Follow up With Details Comments Contact Info Mario Bates MD   [address] 
[phone] Your Scheduled Appointments 2018  SECTION with Enzo Ho MD  
[facility] 3 LABOR & DELIVERY ([address]) [address] [place] [address]  
[phone] Discharge Orders None A check erika indicates which time of day the medication should be taken. My Medications CONTINUE taking these medications Instructions Each Dose to Equal  
 Morning Noon Evening Bedtime LEXAPRO 5 mg tablet Generic drug:  escitalopram oxalate Your last dose was: Your next dose is: Take 5 mg by mouth daily. Indications: ANXIETY WITH DEPRESSION  
 5 mg SLOW  mg (45 mg iron) ER tablet Generic drug:  ferrous sulfate Your last dose was: Your next dose is: Take  by mouth two (2) times a day. ZANTAC 75 75 mg tablet Generic drug:  raNITIdine Your last dose was: Your next dose is: Take 75 mg by mouth two (2) times a day. 75 mg Discharge Instructions  DISCHARGE INSTRUCTIONS Name: Delores Carson YOB: 1995 Primary Diagnosis: Active Problems: 
  Pregnancy (3/25/2015) Introduction: You have visited the hospital because you thought you were in  labor. These guidelines are for your information at home to help prevent repeated problems. In general, you should remember: 
? Empty your bladder every 2-3 hours. ? Avoid breast stimulation (including showers where the water stream is on your breasts)-this can cause contractions. ? Rest means lying down. ? Contractions and cramping happen more often in evening and nighttime. ? No intercourse or sexual stimulation without asking your doctor. ? Try to arrange for help with housework and . General:  
 
Follow up with Carli Ramesh 3/12 and to call for appointment with [facility] Diet/Diet Restrictions:   
 
Anything you like/tolerate, Follow your regularly prescribed diet, Drink 8-10 glasses of water each day. Avoid beverages with caffeine. and Eat fresh vegetables, fruits, bran cereal to avoid becoming constipated. Physical Activity / Restrictions / Safety:  
 
* Activity at home is based on how strong your  labor has been, You should follow the following activity guidelines. Modified bedrest:   
    
Discharge Instructions/ Special Treatment/ Home Care Needs:  
 
Call your provider if: 
? Uterine cramping (menstrual-like cramps, intermittent or constant ? Uterine contractions every 10-15 minutes or more frequently ? Low abdominal pressure ( pelvic pressure) ? Dull low backache (intermittent or constant) ? Increase or change in vaginal discharge ? Feeling that the baby is \"pushing down\" ? Abdominal cramping with or without diarrhea If any of these symptoms are experienced, stop what you are doing, lie down on your side, drink two to three glasses of water and wait one hour. If the symptoms persist or get worse, call your provider.  
 
Pain Management:  
 
 
 
 
Signed By: Tien Harris RN                                                                                                   Date: 3/8/2018 Time: 12:58 PM 
 
Discharge Checklist-NURSING TO COMPLETE:  
 
Date and Time of Discharge: Date: 3/8/2018 Time: 12:58 PM 
 
Return of:  
Dental Appliance: Dental Appliances: None Vision: Visual Aid: None Hearing Aid:   
Jewelry: Jewelry: Ring Clothing: Clothing: Shirt, Undergarments, Pants Other Valuables: Other Valuables: None Valuables sent to safe: Personal Items Sent to Safe: none Prescription Given: no 
Medication Instruction Sheet(s), including side effects, provided: no 
 
Accompanied By: Family Mode of Transportation: 
 
Discharge Disposition: Home I have had the opportunity to make my options or choices for discharge. I have received and understand these instructions. These are general instructions for a healthy lifestyle: No smoking/ No tobacco products/ Avoid exposure to second hand smoke Surgeon General's Warning:  Quitting smoking now greatly reduces serious risk to your health. Obesity, smoking, and sedentary lifestyle greatly increases your risk for illness A healthy diet, regular physical exercise & weight monitoring are important for maintaining a healthy lifestyle Recognize signs and symptoms of STROKE: 
 
F-face looks uneven A-arms unable to move or move unevenly S-speech slurred or non-existent T-time-call 911 as soon as signs and symptoms begin-DO NOT go Back to bed or wait to see if you get better-TIME IS BRAIN. [employer] Announcement We are excited to announce that we are making your provider's discharge notes available to you in [employer]. You will see these notes when they are completed and signed by the physician that discharged you from your recent hospital stay. If you have any questions or concerns about any information you see in [employer], please call the Health Information Department where you were seen or reach out to your Primary Care Provider for more information about your plan of care. Introducing [facility]! [facility] introduces [employer] patient portal. Now you can access parts of your medical record, email your doctor's office, and request medication refills online. 1. In your internet browser, go to [email or web address] 2. Click on the First Time User? Click Here link in the Sign In box. You will see the New Member Sign Up page. 3. Enter your [employer] Access Code exactly as it appears below. You will not need to use this code after youve completed the sign-up process. If you do not sign up before the expiration date, you must request a new code. · [employer] Access Code: [email or web address] Expires: 6/6/2018  2:56 PM 
 
4. Enter the last four digits of your Social Security Number (xxxx) and Date of Birth (mm/dd/yyyy) as indicated and click Submit. You will be taken to the next sign-up page. 5. Create a [employer] ID. This will be your [employer] login ID and cannot be changed, so think of one that is secure and easy to remember. 6. Create a [employer] password. You can change your password at any time. 7. Enter your Password Reset Question and Answer. This can be used at a later time if you forget your password. 8. Enter your e-mail address. You will receive e-mail notification when new information is available in [address] 9. Click Sign Up. You can now view and download portions of your medical record. 10. Click the Download Summary menu link to download a portable copy of your medical information. If you have questions, please visit the Frequently Asked Questions section of the [employer] website. Remember, [employer] is NOT to be used for urgent needs. For medical emergencies, dial 911. Now available from your iPhone and Android! Providers Seen During Your Hospitalization Provider Specialty Primary office phone Nael Bray MD Obstetrics & Gynecology [phone] Your Primary Care Physician (PCP) Primary Care Physician Office Phone Office Fax Sonali Salts [facility] You are allergic to the following No active allergies Recent Documentation Height Weight Breastfeeding? BMI OB Status Smoking Status 1.702 m 83.5 kg No 28.82 kg/m2 Pregnant Former Smoker Emergency Contacts Name Discharge Info Relation Home Work [facility] N/A  AT THIS TIME [6] Mother [14] [phone] Patient Belongings The following personal items are in your possession at time of discharge: 
  Dental Appliances: None  Visual Aid: None      Home Medications: Kept at bedside   Jewelry: Ring  Clothing: Shirt, Undergarments, Pants    Other Valuables: None  Personal Items Sent to Safe: none Please provide this summary of care documentation to your next provider. Signatures-by signing, you are acknowledging that this After Visit Summary has been reviewed with you and you have received a copy. Patient Signature:  ____________________________________________________________ Date:  ____________________________________________________________  
  
[facility] Provider Signature:  ____________________________________________________________ Date:  ____________________________________________________________

## 2018-02-27 NOTE — IP AVS SNAPSHOT
[facility] [address] [place] [address] 
[phone] Patient: Yaa Umanzor MRN: [id number] IQK:5/39/7366 A check erika indicates which time of day the medication should be taken. My Medications CONTINUE taking these medications Instructions Each Dose to Equal  
 Morning Noon Evening Bedtime LEXAPRO 5 mg tablet Generic drug:  escitalopram oxalate Your last dose was: Your next dose is: Take 5 mg by mouth daily. Indications: ANXIETY WITH DEPRESSION  
 5 mg SLOW  mg (45 mg iron) ER tablet Generic drug:  ferrous sulfate Your last dose was: Your next dose is: Take  by mouth two (2) times a day. ZANTAC 75 75 mg tablet Generic drug:  raNITIdine Your last dose was: Your next dose is: Take 75 mg by mouth two (2) times a day.   
 75 mg

## 2018-02-28 PROCEDURE — 65270000029 HC RM PRIVATE

## 2018-02-28 PROCEDURE — 59025 FETAL NON-STRESS TEST: CPT

## 2018-02-28 PROCEDURE — 74011250636 HC RX REV CODE- 250/636: Performed by: OBSTETRICS & GYNECOLOGY

## 2018-02-28 PROCEDURE — 74011250637 HC RX REV CODE- 250/637: Performed by: OBSTETRICS & GYNECOLOGY

## 2018-02-28 RX ORDER — DOCUSATE SODIUM 100 MG/1
100 CAPSULE, LIQUID FILLED ORAL DAILY
Status: DISCONTINUED | OUTPATIENT
Start: 2018-03-01 | End: 2018-03-08 | Stop reason: HOSPADM

## 2018-02-28 RX ADMIN — Medication 325 MG: at 09:00

## 2018-02-28 RX ADMIN — Medication 10 ML: at 15:25

## 2018-02-28 RX ADMIN — FAMOTIDINE 20 MG: 20 TABLET, FILM COATED ORAL at 22:49

## 2018-02-28 RX ADMIN — FAMOTIDINE 20 MG: 20 TABLET, FILM COATED ORAL at 09:52

## 2018-02-28 RX ADMIN — Medication 2 G/HR: at 01:15

## 2018-02-28 RX ADMIN — ACETAMINOPHEN 650 MG: 325 TABLET, FILM COATED ORAL at 15:24

## 2018-02-28 RX ADMIN — BETAMETHASONE SODIUM PHOSPHATE AND BETAMETHASONE ACETATE 12 MG: 3; 3 INJECTION, SUSPENSION INTRA-ARTICULAR; INTRALESIONAL; INTRAMUSCULAR at 08:54

## 2018-02-28 RX ADMIN — ACETAMINOPHEN 650 MG: 325 TABLET, FILM COATED ORAL at 09:06

## 2018-02-28 NOTE — PROGRESS NOTES
[facility] Patient of Dr. Khushbu Garnett arrived ambulatory for neuro-protective magnesium and betamethasone. Oriented to room. 1930 IV started, labs drawn and sent. 1955 Dr. Jennifer Mckeon at bedside discussing [address]. All questions answered. 2335 Bedside and Verbal shift change report given to MINAL Mast RN  (oncoming nurse) by Shannan Galvan RN (offgoing nurse). Report included the following information SBAR, Kardex, Procedure Summary, Intake/Output, MAR and Recent Results.

## 2018-02-28 NOTE — PROGRESS NOTES
Ante Partum Progress Note    Jay Breed  [id number]    Assessment: 29w4d     Problem # 1:  Twins monochorionic/diamniotic. Early FS @16 with MFM___. FS + ECHO with MFM___. q2week fluid check___. Weekly testing @ 32___. Delivery 34-37. (ICD-651.03) (HBE20-Q29.033)  Twin A 38% with low/normal fluid;2 vessel cord  Twin B 98% with polyhydramnios  Approximately 25% discrepancy    Twin A with periods of intermittent absent end diastolic flow   S/P Neuro Mg. BMTZ given x2, dose given 12 hours apart   Reviewed plan for repeat BPP/cord dopplers in AM.        Problem # 2:  Prev LTCS desires repeat: sched 18 (LBV-691.77) (JLL69-E42.981)     Problem # 3:  HSV-Valtrex @ 39 wks____ (NEQ-858.08) (QMY00-T59.062)  no complaints      Problem # 4:  Anemia on FE pregnant (IHC-545.61) (KYH79-O91.013)  Continue Fe. Charges: Medium, NST x2        Patient states she has no new complaints. Pt reports +FM x2, no VB, LOF, CTX. Vitals:  Visit Vitals    /61    Pulse (!) 107    Temp 98.7 °F (37.1 °C)    Resp 16    Ht 5' 7\" (1.702 m)    Wt 83.5 kg (184 lb)    LMP 2017 (Approximate)    SpO2 99%    Breastfeeding No    BMI 28.82 kg/m2     Temp (24hrs), Av.4 °F (36.9 °C), Min:98.1 °F (36.7 °C), Max:98.7 °F (37.1 °C)      Last 24hr Input/Output:    Intake/Output Summary (Last 24 hours) at 18 1121  Last data filed at 18 0907   Gross per 24 hour   Intake          1407.08 ml   Output             2850 ml   Net         -1442.92 ml        Non stress test:  Reactive x2    A: Baseline 130s, + accels, no decels, moderate variability, reactive. B: Basline 140s, + accels, no decels, moderate variability, reactive  Patient Vitals for the past 4 hrs: Mode Fetal Heart Rate Variability RN Reviewed Strip?   18 1114 External 130 6-25 BPM Yes    Patient Vitals for the past 4 hrs:    Mode Fetal Heart Rate Variability RN Reviewed Strip?   18 1114 External 130 6-25 BPM Yes        Uterine Activity: None     Exam:  Patient without distress.      Abdomen, fundus soft non-tender     Extremities, no redness or tenderness               Additional Exam: Deferred    Labs:     Lab Results   Component Value Date/Time    WBC 9.3 02/27/2018 07:29 PM    WBC 14.5 (H) 03/26/2015 03:00 AM    WBC 10.4 03/24/2015 09:50 AM    WBC 8.5 08/10/2012 03:30 PM    WBC 10.8 07/20/2012 04:25 PM    HGB 8.5 (L) 02/27/2018 07:29 PM    HGB 10.6 (L) 03/26/2015 03:00 AM    HGB 12.3 03/24/2015 09:50 AM    HGB 11.7 08/10/2012 03:30 PM    HGB 10.6 (L) 07/20/2012 04:25 PM    HCT 27.2 (L) 02/27/2018 07:29 PM    HCT 32.9 (L) 03/26/2015 03:00 AM    HCT 37.7 03/24/2015 09:50 AM    HCT 36.1 08/10/2012 03:30 PM    HCT 32.1 (L) 07/20/2012 04:25 PM    PLATELET 548 48/07/4804 07:29 PM    PLATELET 492 29/41/4377 03:00 AM    PLATELET 053 10/70/9582 09:50 AM    PLATELET 466 96/56/1299 03:30 PM    PLATELET 081 10/84/2576 04:25 PM       Recent Results (from the past 24 hour(s))   CBC W/O DIFF    Collection Time: 02/27/18  7:29 PM   Result Value Ref Range    WBC 9.3 3.6 - 11.0 K/uL    RBC 3.69 (L) 3.80 - 5.20 M/uL    HGB 8.5 (L) 11.5 - 16.0 g/dL    HCT 27.2 (L) 35.0 - 47.0 %    MCV 73.7 (L) 80.0 - 99.0 FL    MCH 23.0 (L) 26.0 - 34.0 PG    MCHC 31.3 30.0 - 36.5 g/dL    RDW 15.4 (H) 11.5 - 14.5 %    PLATELET 141 651 - 958 K/uL    MPV 11.8 8.9 - 12.9 FL    NRBC 0.0 0  WBC    ABSOLUTE NRBC 0.00 0.00 - 0.01 K/uL

## 2018-02-28 NOTE — PROGRESS NOTES
Problem: Falls - Risk of  Goal: *Absence of Falls  Document Griffin Fall Risk and appropriate interventions in the flowsheet.    Outcome: Progressing Towards Goal  Fall Risk Interventions:            Medication Interventions: Patient to call before getting OOB

## 2018-02-28 NOTE — PROGRESS NOTES
Bedside and Verbal shift change report given to INOCENTE Garibay RN (oncoming nurse) by MINAL Garcia RN (offgoing nurse). Report included the following information SBAR, MAR and Recent Results. 0915  VSS, P 115. Dr. Guero Pineda aware of elevated pulse and no new orders received. Mews score of 3. No further intervention needed. 1055  Removed from monitor. Infants very active. Patient up to [facility] to void. Patient states she can feel both fetuses move - Baby A is not moving as much as usual. Baby B is active. States she has felt no uc's or cramping. 1104  Dr. Guero Pineda at [facility].    [phone]  NST in progress. Baby A is RLQ, Baby B is slightly up and to the L of umb. [address] [place] [address] Drive

## 2018-02-28 NOTE — H&P
EDC by conception date: 05/15/2018      EDC:2018  EGA: 29 weeks, 3 days      Primary Provider:  Terri Buckner MD      History of Present Illness: The patient is a  with mono/di twins at 34 3/7 weeks admitted for neuro mag prophylaxis and BMZ today per Dr Leslie Gunter, who has seen the patient today. She has been monitored closely. Today her usound is notable for Twin A with periods of intermittent absent end diastolic GYGQ,(49%) low normal fluid and Twin B has polyhydramnios with EGA is 98%. Cervical length is normal .   She has been admitted for the above. No complaints. No leaking fluid or bleeding. Reports active fetal movement . Patient's Prenatal Care with Doctor of Record Blanquita Amin MD Notable For -    Anemia on FE pregnant  Baby B: LGA with umbilical cord varix, MFM following  HSV-Valtrex @ 39 wks____  Prev LTCS desires repeat: sched 18  RH negative rhogam 28 wks _given ___   lab screening  Twins monochorionic/diamniotic. Early FS @16 with MFM___. FS + ECHO with MFM___. q2week fluid check___. Weekly testing @ 32___. Delivery 34-37. Depression in pregnancy, was on zoloft now off. Family History of Breast Cancer          Impression & Recommendations:    Problem # 1:  Twins monochorionic/diamniotic. Early FS @16 with MFM___. FS + ECHO with MFM___. q2week fluid check___. Weekly testing @ 32___. Delivery 34-37.  (ICD-651.03) (AHY78-N65.033)  Twin A 38% with low/normal fluid;2 vessel cord  Twin B 98% with polyhydramnios  Approximately 25% discrepancy    Today Twin A with periods of intermittent absent end diastolic flow     will administer neuro magnesium prophylaxis  BMZ 12ng IM now and again in 24 hours          Problem # 2:  Prev LTCS desires repeat: sched 18 (VFZ-598.26) (UFU49-U67.211)    Problem # 3:  HSV-Valtrex @ 36 wks____ (YMM-365.64) (KUM63-H19.313)  no complaints     Problem # 4:  Anemia on FE pregnant (OKM-007.17) (XPO82-N05.013)  The patient is now on iron  will check on admission and continue treatment      Past Pregnancy History      :  3     Term Births:  2     Premature Births: 0     Living Children: 2     Para:   2     Prev : 2     Aborta:  0     Elect. Ab:  0     Spont. Ab:  0     Ectopics:  0    Pregnancy # 1     Delivery date:   2012     Weeks Gestation: 37w      labor:   no     Delivery type:   LTCS     Anesthesia type:   spinal     Delivery location:   [address] [place] [address]     Infant Sex:  Female     Birth weight:  5 lbs 13 ozs     Name:  haylee Abrams     Comments:   [place]- 1LTCS for IUGR/Oligo/breech at 37wks. infant in NICU for grunting. Pregnancy # 2     Delivery date:   2015     Weeks Gestation: 44     Delivery type:   RLTCS     Delivery location:        Infant Sex:  Female     Birth weight:  3.695 kg     Name:  Claudine     Comments:  Génesis - Scheduled RLTCS. Apgars 9/9,         Past Medical History:     Reviewed history from 10/19/2017 and no changes required:        HSV pos. Past Surgical History:     Reviewed history from 10/19/2017 and no changes required:        1LTCS for IUGR/Oligo/breech at 37wks. infant in NICU for grunting. [id number]  Marlonysraphael         8/10/12 suction D&C for retained poc. Primitivo Powers         03/25/15 RLTCS Female Dr. Mckayla Robles     Family History Summary:      Reviewed history Last on 2017 and no changes required:2018      General Comments - FH:  Family history transferred to [address] [place] [address]     Social History:     Reviewed history from 10/19/2017 and no changes required:        Not Stable Relationship 2017                Working -unemployed                Smoking History:        Patient has never smoked.       Previous Tobacco Use: Signed On - 10/19/2017  Smoked Tobacco Use:  Never smoker  Smokeless Tobacco Use:  yes     Counseled to quit/cut down:  no  Passive smoke exposure:  no  Drug use:  no  HIV high-risk behavior:  No  Caffeine use:  <1 drinks per day    Previous Alcohol Use: Signed On - 10/19/2017  Alcohol use:  no  Exercise:  yes     Times per week:  3     Type of Exercise:  walking  Seatbelt use:  100 %  Sun Exposure:  frequently    Family History Risk Factors:     Family History of MI in females < 72years old:  no     Family History of MI in males < 54years old:  no    PAP Smear History:     Date of Last PAP Smear:  10/19/2017      Review of Systems        See HPI    Except as noted in the HPI, the review of systems is negative for General, Breast, , CV, Resp, GI, Endo, MS, Derm, Neuro, Psych, Eyes, ENT, Allergy and Heme. Allergies      Medications Removed from Medication List        Flowsheet View for Follow-up Visit     Estimated weeks of        gestation:  29 3/7      Vital Signs            Physical Exam     General           General appearance:  no acute distress    Abdomen           Abdomen:  gravid    Pelvic Exam           Cervix:  no lesions or discharge            Impression & Recommendations:    Problem # 1:  Twins monochorionic/diamniotic. Early FS @16 with MFM___. FS + ECHO with MFM___. q2week fluid check___. Weekly testing @ 32___. Delivery 34-37.  (ICD-651.03) (AHX55-V08.033)  Twin A 38% with low/normal fluid;2 vessel cord  Twin B 98% with polyhydramnios  Approximately 25% discrepancy    Today Twin A with periods of intermittent absent end diastolic flow     will administer neuro magnesium prophylaxis  BMZ 12ng IM now and again in 24 hours    Had 28 week-labs last week  Rhogam administered in the office    Problem # 2:  Prev LTCS desires repeat: sched 4/17/18 (RXF-399.89) (TKH24-P14.211)    Problem # 3:  HSV-Valtrex @ 39 wks____ (CWC-077.88) (YKJ12-P81.313)  no complaints     Problem # 4:  Anemia on FE pregnant (VFO-472.96) (PEP96-F00.013)  The patient is now on iron  will check on admission and continue treatment      Medications (at conclusion of this visit)    12/14/2017 FIORICET -40 MG ORAL CAPSULE (BUTALBITAL-APAP-CAFFEINE) one to two every four to six hours prn headache  09/22/2017 DICLEGIS 10-10 MG ORAL TABLET DELAYED RELEASE (DOXYLAMINE-PYRIDOXINE) take two tablets at bedtime every night, if symptoms persisit add 1 tablet every morning starting Day 3.  08/01/2014 PRENATAL ADULT GUMMY/DHA/FA 0.4-25 MG ORAL TABLET CHEWABLE (PRENATAL MV & MIN W/FA-DHA)           LABORATORY DATA   TEST DATE RESULT   Group B Strep culture 03/25/2015 *                                   (Group B Strep Culture Result Field)   Blood Type 10/19/2017 O                                             (Blood Type Result Field)   Rh 10/19/2017 Negative                                   (Rh Result Field)   Rhogam Inj Given 02/13/2018 Full Dose   Tdap Vaccine Given 02/13/2018 Vacc. [address]   Antibody Screen 02/13/2018 Negative   Rubella  [facility] Reference Ranges On or After 3/10/14                  <0.90              Non-immune      0.90 - 0.99     Equivocal      >0.99              Immune    [facility] Reference Ranges  Before 3/10/14           <5                 Non-immune             5 - 9               Equivocal            >9                 Immune  [facility] Reference Ranges       < Or = 0.90       Negative             0.91-1.09          Equivocal            > Or = 1.10       Positive   10/19/2017     1.27     TPA (T Pallidum Antibodies) 02/13/2018 Negative   Serology (RPR) 03/25/2015 *   HBsAg 10/19/2017 Negative   HIV 10/19/2017 Non Reactive   Hemoglobin 02/13/2018 9.2   Hematocrit 02/13/2018 29.9   Platelets 58/63/7422 211 X10E3/UL   TSH 03/25/2015 *   Urine Culture 10/19/2017 Negative   GC DNA Probe 10/19/2017 Negative   Chlamydia DNA 10/19/2017 Negative   PAP 10/19/2017 NIL   Flu Vaccine Given 10/19/2017 Vacc.  [address]   HGBA1C 03/25/2015 *   HGB Electro     T4, Free 03/25/2015 *   BG Fasting 03/25/2015 *   GTT 1H 50G 02/13/2018 112   GTT 1H 100G 03/25/2015 *   GTT 2H 100G 03/25/2015 *   GTT 3H 100G 03/25/2015 *   Glucose Plasma 03/25/2015 *   CF Accept or Decline 10/19/2017 Declined   CF Screen Result 10/19/2017 Declined   Nuchal Trans 11/06/2017 1.5^1.5 mm&millimeters   AFP Only 12/14/2017 *Screen Negative*   Tetra 12/11/2017 Not Indicated   AFP Serum 03/25/2015 *   CVS 03/25/2015 *   AFP Amniotic 03/25/2015 *   Amnio Karyo 03/25/2015 *   FISH 03/25/2015 *   GC Culture 03/25/2015 *   Chlamydia Cult 03/25/2015 *   Ureaplasma     Mycoplasma     WBC 10/19/2017 9.0 X10E3/UL   RBC 10/19/2017 5.03 X10E6/UL   MCV 10/19/2017 72   MCH 10/19/2017 23.3   MCHC RBC 10/19/2017 32.1     ULTRASOUND DATA   TEST DATE RESULT   Estimated Fetal Weight 02/06/2018 1228.33368716^1229 g&grams                                     Weight % 02/06/2018 98^98% %&percent                                                KATHY 07/27/2012 4.152896                    BPP 02/20/2018 8^8 [n/a]&Not applicable   Cervical Length (mm)             Electronically signed by Vinnie Otero MD on 02/27/2018 at 7:29 PM    ________________________________________________________________________    Moderate level admission with NST x 2

## 2018-02-28 NOTE — PROGRESS NOTES
Bedside shift change report given to MINAL Mast RN (oncoming nurse) by Tiffanie Guallpa RN (offgoing nurse). Report included the following information SBAR, Kardex, Intake/Output, MAR and Recent Results. 0745 Bedside shift change report given to INOCENTE Garibay RN (oncoming nurse) by MINAL Meek RN (offgoing nurse). Report included the following information SBAR, Kardex, Intake/Output, MAR and Recent Results.

## 2018-03-01 PROCEDURE — 65270000029 HC RM PRIVATE

## 2018-03-01 PROCEDURE — 74011250637 HC RX REV CODE- 250/637: Performed by: OBSTETRICS & GYNECOLOGY

## 2018-03-01 PROCEDURE — 76815 OB US LIMITED FETUS(S): CPT | Performed by: OBSTETRICS & GYNECOLOGY

## 2018-03-01 PROCEDURE — 76819 FETAL BIOPHYS PROFIL W/O NST: CPT | Performed by: OBSTETRICS & GYNECOLOGY

## 2018-03-01 PROCEDURE — 76820 UMBILICAL ARTERY ECHO: CPT | Performed by: OBSTETRICS & GYNECOLOGY

## 2018-03-01 RX ADMIN — DOCUSATE SODIUM 100 MG: 100 CAPSULE, LIQUID FILLED ORAL at 08:44

## 2018-03-01 RX ADMIN — Medication 10 ML: at 22:00

## 2018-03-01 RX ADMIN — Medication 325 MG: at 08:44

## 2018-03-01 RX ADMIN — Medication 10 ML: at 14:02

## 2018-03-01 RX ADMIN — FAMOTIDINE 20 MG: 20 TABLET, FILM COATED ORAL at 08:46

## 2018-03-01 RX ADMIN — Medication 10 ML: at 07:01

## 2018-03-01 RX ADMIN — FAMOTIDINE 20 MG: 20 TABLET, FILM COATED ORAL at 21:16

## 2018-03-01 NOTE — PROGRESS NOTES
0750: Bedside and Verbal shift change report given to SHEA Garcia (oncoming nurse) by MINAL Portillo (offgoing nurse). Report included the following information SBAR, Procedure Summary, Intake/Output, MAR and Recent Results. Plans for [facility] appointment at 21  this morning. 1115: Patient off unit to [facility]     1400: Patient back from [facility]. 1820: Patient off unit to visit children downstairs. Has hospital privileges. 2003: Bedside and Verbal shift change report given to MINAL Oh (oncoming nurse) by SHEA Pena (offgoing nurse). Report included the following information SBAR, Procedure Summary, Intake/Output, MAR and Recent Results.

## 2018-03-01 NOTE — PROGRESS NOTES
Ante Partum Progress Note    Ignacio Charlton  29w5d    Assessment: 29w5d      Problem # 1:  Twins monochorionic/diamniotic. Early FS @16 with MFM___. FS + ECHO with MFM___. Mayra Birch fluid check___. Lucero Cordero testing @ 32___. Lucrecia Nielsen 34-37. 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       Plan:  Discussed with Drs. Denver Adie - per Dr. Italia Siddiqui recommendation, will monitor as an inpatient  Twice daily NST  Twice weekly BPP    Charges: Medium, NST x2    Subjective:   Patient states she has no new complaints. She reports good fetal movement. Does not believe she's had any contractions. No concerns, just missing her two children at home.     Vitals:  Visit Vitals    /58 (BP 1 Location: Left arm, BP Patient Position: At rest)    Pulse 99    Temp 98.9 °F (37.2 °C)    Resp 16    Ht 5' 7\" (1.702 m)    Wt 83.5 kg (184 lb)    LMP 2017 (Approximate)    SpO2 98%    Breastfeeding No    BMI 28.82 kg/m2     Temp (24hrs), Av.5 °F (36.9 °C), Min:98.1 °F (36.7 °C), Max:98.9 °F (37.2 °C)      Last 24hr Input/Output:  No intake or output data in the 24 hours ending 18 5053     Non stress test:  Reactive           Uterine Activity: occasional contractions, but less than one every 10 min    Exam:  Patient without distress, sitting in bed    Abdomen, fundus soft non-tender    Extremities, no redness or tenderness             Additional Exam: Deferred    Labs:     Lab Results   Component Value Date/Time    WBC 9.3 02/27/2018 07:29 PM    WBC 14.5 (H) 03/26/2015 03:00 AM    WBC 10.4 03/24/2015 09:50 AM    WBC 8.5 08/10/2012 03:30 PM    WBC 10.8 07/20/2012 04:25 PM    HGB 8.5 (L) 02/27/2018 07:29 PM    HGB 10.6 (L) 03/26/2015 03:00 AM    HGB 12.3 03/24/2015 09:50 AM    HGB 11.7 08/10/2012 03:30 PM    HGB 10.6 (L) 07/20/2012 04:25 PM    HCT 27.2 (L) 02/27/2018 07:29 PM    HCT 32.9 (L) 03/26/2015 03:00 AM    HCT 37.7 03/24/2015 09:50 AM    HCT 36.1 08/10/2012 03:30 PM    HCT 32.1 (L) 07/20/2012 04:25 PM    PLATELET 934 75/70/8364 07:29 PM    PLATELET 478 00/54/7782 03:00 AM    PLATELET 835 27/09/0619 09:50 AM    PLATELET 980 96/32/7421 03:30 PM    PLATELET 609 62/53/6841 04:25 PM       No results found for this or any previous visit (from the past 24 hour(s)).

## 2018-03-01 NOTE — PROGRESS NOTES
NST Reviewed. Mono /di twins @ 29 weeks 4 days.  S/p magnesium and Betamethasone suspected TTTS to get BPP in Am by MFM

## 2018-03-01 NOTE — PROGRESS NOTES
2340-Bedside and Verbal shift change report given to MINAL Portillo RN  (oncoming nurse) by Thor Hurtado RN  (offgoing nurse). Report included the following information SBAR, Kardex, MAR, Accordion, Recent Results and Med Rec Status. CLient in bed resting, no distress noted bbut audible snoring heard. 0300-Client resting quietly no distress noted. 0600-Client resting no distress noted   0630-Client awake, no complaints at this time.   0708-Monitors applied for AM NST,, twins very active this AM.

## 2018-03-02 PROCEDURE — 65270000029 HC RM PRIVATE

## 2018-03-02 PROCEDURE — 74011250636 HC RX REV CODE- 250/636: Performed by: OBSTETRICS & GYNECOLOGY

## 2018-03-02 PROCEDURE — 74011250637 HC RX REV CODE- 250/637: Performed by: OBSTETRICS & GYNECOLOGY

## 2018-03-02 PROCEDURE — 59025 FETAL NON-STRESS TEST: CPT

## 2018-03-02 RX ORDER — POLYETHYLENE GLYCOL 3350 17 G/17G
17 POWDER, FOR SOLUTION ORAL DAILY
Status: DISCONTINUED | OUTPATIENT
Start: 2018-03-03 | End: 2018-03-02

## 2018-03-02 RX ORDER — POLYETHYLENE GLYCOL 3350 17 G/17G
17 POWDER, FOR SOLUTION ORAL
Status: DISCONTINUED | OUTPATIENT
Start: 2018-03-02 | End: 2018-03-08 | Stop reason: HOSPADM

## 2018-03-02 RX ADMIN — ACETAMINOPHEN 650 MG: 325 TABLET, FILM COATED ORAL at 10:29

## 2018-03-02 RX ADMIN — ACETAMINOPHEN 650 MG: 325 TABLET, FILM COATED ORAL at 15:00

## 2018-03-02 RX ADMIN — DOCUSATE SODIUM 100 MG: 100 CAPSULE, LIQUID FILLED ORAL at 09:30

## 2018-03-02 RX ADMIN — ACETAMINOPHEN 650 MG: 325 TABLET, FILM COATED ORAL at 22:53

## 2018-03-02 RX ADMIN — Medication 5 ML: at 23:00

## 2018-03-02 RX ADMIN — SODIUM CHLORIDE, SODIUM LACTATE, POTASSIUM CHLORIDE, AND CALCIUM CHLORIDE 1000 ML: 600; 310; 30; 20 INJECTION, SOLUTION INTRAVENOUS at 23:00

## 2018-03-02 RX ADMIN — FAMOTIDINE 20 MG: 20 TABLET, FILM COATED ORAL at 22:56

## 2018-03-02 RX ADMIN — Medication 10 ML: at 15:00

## 2018-03-02 RX ADMIN — FAMOTIDINE 20 MG: 20 TABLET, FILM COATED ORAL at 09:33

## 2018-03-02 RX ADMIN — Medication 325 MG: at 09:30

## 2018-03-02 NOTE — PROGRESS NOTES
MFM - Ms. Charlton's case reviewed in detail with Dr. Alley Kevin yesterday. Limited inpatient MFM evaluation requested and performed yesterday (3-1-18) at the request of Dr. Alley Kevin. Findings were reviewed with Ms. Ros Burt and with Dr. Alley Kevin at that time with agreement to continue the admission plan for close evaluation and completion of  steroids. The report from yesterday's ultrasound is posted at this time (please find this as a scanned document from my office). My office's findings concur with the growth discrepancy finding (Twin B larger than Twin A by ~31%) and polyhydramnios for Twin B. Growth tables used in my office give EFW at 47%ile and 88%ile for Twins A and B, respectively. Amnionic fluid around Twin A is normal, but appears subjectively decreased. Umbilical artery Doppler shows intermittent increased resistance for Twin A (single umbilical artery is noted) with forward end-diastolic flow observed at all times; umbilical artery Doppler for Twin B is normal.  An intrahepatic umbilical vein varix is appreciated in Twin B. A text copy of yesterday's ultrasound report and recommendations is provided below for convenience. Please contact my office as clinically indicated or otherwise desired for any additional MFM support for Ms. Charlton during her inpatient stay. Karolyn Veloz M.D., Ph.D.  Yeny Girard    ---------------------------    Indication: Twins Mono/Di 3rd Tri O30.033; polyhydramnios; abnormal umbilical artery Doppler. ____________________________________________________________________________  History: Age: 23 years.  : 3 Para: 2.  ____________________________________________________________________________  Dating:  Stated EDC:  EDC: 18 GA by stated EDC: 29w5d  Biometry Fetus 1:  EDC: 18 GA by Fetus 1: 29w1d  Biometry Fetus 2:  EDC: 18 GA by Fetus 2: 31w5d  Best Overall Assessment: 18 EDC: 18 Assessed GA: 83S9M  The Best Overall Assessment is based on the stated EDC.  ____________________________________________________________________________  General Evaluation:  Fetus 1:  Fetal heart activity: present. Fetal heart rate: 147 bpm.   Presentation: BREECH, Maternal right side. Fetal movement: visible. Amniotic Fluid: normal. Maximal vertical pocket 6.0 cm. Cord: 2 vessels. Placenta: posterior. Fetus 2:  Fetal heart activity: present. Fetal heart rate: 132 bpm.   Presentation: unstable lie, Maternal left side. Fetal movement: visible. Amniotic Fluid: polyhydramnios. Maximal vertical pocket 11.0 cm. Cord: 3 vessels. Placenta: posterior. ____________________________________________________________________________  Anatomy Scan:  Twin gestation. Fetus 1:  Biometry:  Fetal Measurements used for the estimation of the gestational age are bolded. BPD 74.8 mm 47th% 30w0d (29w2d to 30w5d)  .0 mm 12th% 29w3d (27w3d to 31w4d)  .3 mm 38th% 29w4d (28w6d to 30w2d)  FL 55.5 mm 23rd% 29w2d (27w1d to 31w2d)  OFD 95.2 mm 20th% 28w4d  HUM 47.5 mm 11th% 27w5d  VENTRp 6.5 mm n/a  EFW (lbs/oz) 3 lbs 1 ozs  EFW (g) 1396 g  47th%       Fetal Anatomy:  Visualized with normal appearance: head, chest, gastrointestinal tract, kidneys, bladder. Brain: Visualized and normal appearance: brain parenchyma, cerebral ventricles, choroid plexus, midline falx. Not visible: posterior fossa. Heart: The 4-chamber view was obtained but outflow tracts could not be adequately visualized. Genitalia: Female fetus. Fetus 2:  Biometry:  Fetal Measurements used for the estimation of the gestational age are bolded. BPD 77.2 mm 77th% 31w0d (30w1d to 31w5d)  .0 mm 66th% 31w3d (28w3d to 34w3d)  .5 mm >95th% 33w2d (32w2d to 34w2d)  FL 62.4 mm 94th% 32w2d (29w2d to 35w2d)  .0 mm 78th% 31w0d  HUM 53.2 mm 79th% 31w1d  VENTRp 7.0 mm n/a  EFW (lbs/oz) 4 lbs 7 ozs  EFW (g) 2014 g  88th%     Intertwin EFW discordance: 30.7 %. Fetal Anatomy:  Visualized with normal appearance: head, chest, kidneys, bladder. Brain: Visualized and normal appearance: brain parenchyma, cerebral ventricles, choroid plexus, midline falx. Not visible: posterior fossa. Heart: The 4-chamber view was obtained but outflow tracts could not be adequately visualized. Gastrointestinal Tract: Visualized with normal appearance: stomach, liver, small bowel, large bowel. Umbilical vein varix (33-NK diameter) located in the fetal liver. Summary of Ultrasound Findings:  Transabdominal US. U/S machine: DxNA E8 Expert. U/S view: limited by late gestational age. Impression: The visible fetal anatomy appears normal, but some fetal anatomy could not be adequately evaluated at this time. ____________________________________________________________________________  Fetal Wellbeing Assessment:  Fetus 1:  Amniotic fluid: normal. MVP: 6.0 cm. Biophysical Profile: Fetal body movements: normal (2), Fetal tone: normal (2), Fetal breathing movements: normal (2), Amniotic fluid volume: normal (2). Score 8 / 8. Fetus 2:  Amniotic fluid: polyhydramnios. MVP: 11.0 cm. Biophysical Profile: Fetal body movements: normal (2), Fetal tone: normal (2), Fetal breathing movements: normal (2), Amniotic fluid volume: normal (2). Score 8 / 8. Summary: Reassuring fetal status for each twin.    ____________________________________________________________________________  Doppler:  Fetal Doppler:  Fetus 1:  Umbilical Artery: PS 30.6 cm/s    ED 5.93 cm/s    EDF present   S/D ratio 4.95     RI 0.80     PI 1.39     TAMX 16.79 cm/s   Impression: Borderline increased flow resistance (S/D ratio elevated; RI and PI are normal and less than 95%ile). Twin A umbilical artery measurements are normal for most of the observed cycles (S/D ratio 3.8 (normal) to 5.9 (increased); persistently normal RI and PI and forward end-diastolic flow).      Fetus 2:  Umbilical Artery: PS -58.4 cm/s    ED -24.44 cm/s    EDF present   S/D ratio 2.39     RI 0.58     PI 0.88     TAMX -38.39 cm/s   Impression: Normal umbilical artery Doppler blood flow indices and waveforms. U/S Machine: [employer] E8 Expert.  ____________________________________________________________________________  Report Summary:  Impression: A follow-up evaluation of twins was performed for fetal evaluation. Biophysical profile and umbilical artery Doppler studies are performed for twins to evaluate fetal well-being. Ms. Jf Cornejo has a monochorionic diamnionic pregnancy with the following outpatient findings:      (a) Fetal growth discrepancy (24%),     (b) Large-for-gestational age (>95%ile), rapidly increasing polyhydramnios, and intrahepatic umbilical vein varix for Twin B, and     (c) Normal fetal growth (40%ile), subjectively reduced amniotic fluid, single umbilical artery, and intermittently abnormal umbilical artery Doppler velocity profiles for Twin A. She is hospitalized to evaluate the changing fetal status and to potentially prepare for indicated  delivery or developing twin-twin transfusion syndrome. She is currently receiving  steroids and magnesium sulfate for  neuroprotection. She has no major medical problems. She reports good fetal movement and denies pain, bleeding, and loss of fluid. A viable twin IUP is observed. Twin assignment is based on fetal size and amniotic fluid appearance. Placental morphology and membrane appearance is consistent with monochorionic-diamnionic placentation. Placental cord insertion size for Twin A is not imaged. Twin A:  BREECH, female, maternal right, posterior placentation. Twin B:  Unstable lie (mostly breech presentation), maternal left, posterior placentation. Composite biometry is consistent with prior dating. EFW is appropriate for gestational age for the twins.   EFW is 47%ile and 88%ile for Twin A and B, respectively, using a lamb growth curve (our MFM practice utilizes lamb growth curves until 32 weeks). Discordance is about 31% and is larger than expected. The evaluated fetal anatomy is notable for a single umbilical artery in Twin A and an intrahepatic umbilical vein varix (96-OS diameter) in Twin B. The remainder evaluated fetal anatomy appears normal, but the fetal anatomy is not evaluated in detail during this study. Normal fetal movements are observed for each twin. Fetal membranes are tightly applied to body of Twin A, so this twin subjectively appears to have reduced amniotic fluid. However amniotic fluid pocket measurements for Twin A are normal.      Amniotic fluid measurements are consistent with polyhydramnios for Twin B (MVP > 11 cm). There is no evidence of twin-twin transfusion syndrome at this time. Fetal assessment is reassuring for each twin. See BPP scores and umbilical artery Doppler reports above. (Umbilical artery flow evaluation for Twin A (single umbilical artery) shows mostly normal flow patterns with infrequent cycles demonstrating increased flow resistance. Recommendations: 1. Continue inpatient status for now. Her care plan is reviewed with Dr. Kira Mckeon. Due to the recent increase in Twin B's amniotic fluid, close evaluation will continue to observe for any changes in maternal-fetal status.  steroids are being administered in anticipation of  delivery. 2.  At least daily FHR monitoring. 3.  Weekly MFM evaluation is planned at this point to evaluate for changes in amniotic fluid status or for features of twin-twin transfusion syndrome. Additional follow-up as clinically indicated. 4.  Delivery timing is not able to determined at this time; Ms. Shola Ventura is advised that  birth is likely in this pregnancy.   5.  Delivery route is anticpated to be  section. ____________________________________________________________________________  Fetal Growth Overview:    Fetus 1  Date GA BPD [mm] HC [mm] AC [mm] FL [mm] HUM [mm] EFW   03/01/18 29 + 5 74.8 47th 270.0 12th 253.3 38th 55.5 23rd 47.5 11th 1396g , 3 lbs 1 ozs 47th%%      Fetus 2  Date GA BPD [mm] HC [mm] AC [mm] FL [mm] HUM [mm] EFW   03/01/18 29 + 5 77.2 77th 286.0 66th 293.5 >95th 62.4 94th 53.2 79th 2014g , 4 lbs 7 ozs 88th%%      Twin A umbilical artery Doppler. Forward end-diastolic flow is noted at all times. Twin B umbilical artery Doppler. Forward end-diastolic flow is noted at all times.

## 2018-03-02 NOTE — PROGRESS NOTES
@2000 Bedside shift change report given to MINAL Oh RN (oncoming nurse) by SHEA Pena RN (offgoing nurse). Report included the following information SBAR, Kardex, MAR and Recent Results.      @Hourly rounds complete [phone]    @ Hourly rounds complete [phone]

## 2018-03-02 NOTE — PROGRESS NOTES
Problem: Falls - Risk of  Goal: *Absence of Falls  Document Griffin Fall Risk and appropriate interventions in the flowsheet. Outcome: Progressing Towards Goal  Fall Risk Interventions:            Medication Interventions: Patient to call before getting OOB         History of Falls Interventions:  Investigate reason for fall

## 2018-03-02 NOTE — PROGRESS NOTES
NUTRITION       Nutrition screening referral was triggered based on results obtained during nursing admission assessment. The patient's chart was reviewed. Pt admitted for twins monochorionic/diamniotic GA 29w6d. Weights:  02/27/18 83.5 kg (184 lb)   08/31/17 69.9 kg (154 lb)   08/20/17 71.4 kg (157 lb 6.4 oz)     Will gladly send preferred snacks and meals. Continue to monitor wt for trends.      Nitesh Dangelo RD

## 2018-03-02 NOTE — PROGRESS NOTES
Bedside and Verbal shift change report given to INOCENTE Garibay RN (oncoming nurse) by MINAL Leon RN (offgoing nurse). Report included the following information SBAR, MAR and Recent Results.

## 2018-03-02 NOTE — PROGRESS NOTES
Ante Partum Progress Note    Yaritza Manners  [id number]    Assessment: 29w6d   Problem # 1:  Twins monochorionic/diamniotic. Early FS @16 with MFM___. FS + ECHO with MFM___. Delle Drop fluid check___. Claudene Countryman testing @ 32___. Kelvin Wilson 34-37.      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        Plan:  Discussed with Liv Syed - per Dr. Dajuan Cedillo recommendation, will monitor as an inpatient  Twice daily NST  Twice weekly BPP       Plan:  Continue hospitalization with hospitalized bedrest    Orders/Charges: Low and Non Stress Test x2  Twin A Baseline 140 with moderate variability accelerations present decelerations absent   Twin B Baseline 150 with moderate variability accelerations present decelerations absent  Fetal activity audible as well as noted per patient    Patient states she has no new complaints    Vitals:  Visit Vitals    /64 (BP 1 Location: Left arm, BP Patient Position: At rest)    Pulse 85    Temp 98.3 °F (36.8 °C)    Resp 16    Ht 5' 7\" (1.702 m)    Wt 83.5 kg (184 lb)    LMP 2017 (Approximate)    SpO2 98%    Breastfeeding No    BMI 28.82 kg/m2     Temp (24hrs), Av.8 °F (37.1 °C), Min:98.3 °F (36.8 °C), Max:99.7 °F (37.6 °C)      Last 24hr Input/Output:  No intake or output data in the 24 hours ending 03/02/18 1154     Non stress test:  Reactive    Patient Vitals for the past 4 hrs: Mode Fetal Heart Rate Fetal Activity Variability   03/02/18 1048 External 150 - 6-25 BPM   03/02/18 0937 - - Present -    Patient Vitals for the past 4 hrs: Mode Fetal Heart Rate Fetal Activity Variability   03/02/18 1048 External 150 - 6-25 BPM   03/02/18 0937 - - Present -        Uterine Activity: very occassional, patient not aware of them     Exam:  Patient without distress. Abdomen, fundus soft non-tender     Extremities, no redness or tenderness               Additional Exam: Deferred    Labs:     Lab Results   Component Value Date/Time    WBC 9.3 02/27/2018 07:29 PM    WBC 14.5 (H) 03/26/2015 03:00 AM    WBC 10.4 03/24/2015 09:50 AM    WBC 8.5 08/10/2012 03:30 PM    WBC 10.8 07/20/2012 04:25 PM    HGB 8.5 (L) 02/27/2018 07:29 PM    HGB 10.6 (L) 03/26/2015 03:00 AM    HGB 12.3 03/24/2015 09:50 AM    HGB 11.7 08/10/2012 03:30 PM    HGB 10.6 (L) 07/20/2012 04:25 PM    HCT 27.2 (L) 02/27/2018 07:29 PM    HCT 32.9 (L) 03/26/2015 03:00 AM    HCT 37.7 03/24/2015 09:50 AM    HCT 36.1 08/10/2012 03:30 PM    HCT 32.1 (L) 07/20/2012 04:25 PM    PLATELET 942 76/95/0456 07:29 PM    PLATELET 009 00/28/6420 03:00 AM    PLATELET 251 02/19/3691 09:50 AM    PLATELET 795 80/73/7850 03:30 PM    PLATELET 345 33/14/9198 04:25 PM       No results found for this or any previous visit (from the past 24 hour(s)).

## 2018-03-03 LAB
APPEARANCE UR: CLEAR
BACTERIA URNS QL MICRO: NEGATIVE /HPF
BILIRUB UR QL: NEGATIVE
COLOR UR: NORMAL
EPITH CASTS URNS QL MICRO: NORMAL /LPF
GLUCOSE UR STRIP.AUTO-MCNC: NEGATIVE MG/DL
HGB UR QL STRIP: NEGATIVE
KETONES UR QL STRIP.AUTO: NEGATIVE MG/DL
LEUKOCYTE ESTERASE UR QL STRIP.AUTO: NEGATIVE
NITRITE UR QL STRIP.AUTO: NEGATIVE
PH UR STRIP: 7 [PH] (ref 5–8)
PROT UR STRIP-MCNC: NEGATIVE MG/DL
RBC #/AREA URNS HPF: NORMAL /HPF (ref 0–5)
SP GR UR REFRACTOMETRY: 1.01 (ref 1–1.03)
UA: UC IF INDICATED,UAUC: NORMAL
UROBILINOGEN UR QL STRIP.AUTO: 0.2 EU/DL (ref 0.2–1)
WBC URNS QL MICRO: NORMAL /HPF (ref 0–4)

## 2018-03-03 PROCEDURE — 74011250637 HC RX REV CODE- 250/637: Performed by: OBSTETRICS & GYNECOLOGY

## 2018-03-03 PROCEDURE — 81001 URINALYSIS AUTO W/SCOPE: CPT | Performed by: OBSTETRICS & GYNECOLOGY

## 2018-03-03 PROCEDURE — 74011250636 HC RX REV CODE- 250/636: Performed by: OBSTETRICS & GYNECOLOGY

## 2018-03-03 PROCEDURE — 87081 CULTURE SCREEN ONLY: CPT | Performed by: OBSTETRICS & GYNECOLOGY

## 2018-03-03 PROCEDURE — 65270000029 HC RM PRIVATE

## 2018-03-03 RX ORDER — TERBUTALINE SULFATE 1 MG/ML
INJECTION SUBCUTANEOUS
Status: DISCONTINUED
Start: 2018-03-03 | End: 2018-03-03

## 2018-03-03 RX ORDER — CALCIUM CARBONATE 200(500)MG
200 TABLET,CHEWABLE ORAL
Status: DISCONTINUED | OUTPATIENT
Start: 2018-03-03 | End: 2018-03-08 | Stop reason: HOSPADM

## 2018-03-03 RX ORDER — TERBUTALINE SULFATE 1 MG/ML
0.25 INJECTION SUBCUTANEOUS
Status: COMPLETED | OUTPATIENT
Start: 2018-03-03 | End: 2018-03-03

## 2018-03-03 RX ADMIN — Medication 10 ML: at 20:54

## 2018-03-03 RX ADMIN — FAMOTIDINE 20 MG: 20 TABLET, FILM COATED ORAL at 11:02

## 2018-03-03 RX ADMIN — TERBUTALINE SULFATE 0.25 MG: 1 INJECTION SUBCUTANEOUS at 02:19

## 2018-03-03 RX ADMIN — DOCUSATE SODIUM 100 MG: 100 CAPSULE, LIQUID FILLED ORAL at 09:00

## 2018-03-03 RX ADMIN — ONDANSETRON 4 MG: 4 TABLET, ORALLY DISINTEGRATING ORAL at 17:20

## 2018-03-03 RX ADMIN — Medication 325 MG: at 08:59

## 2018-03-03 RX ADMIN — FAMOTIDINE 20 MG: 20 TABLET, FILM COATED ORAL at 20:54

## 2018-03-03 RX ADMIN — CALCIUM CARBONATE (ANTACID) CHEW TAB 500 MG 200 MG: 500 CHEW TAB at 17:20

## 2018-03-03 RX ADMIN — Medication 10 ML: at 09:04

## 2018-03-03 NOTE — PROGRESS NOTES
NST Reviewed. [place] /Di Twins @ 29w6D S/p Magnesium and BetaMethasone for suspected TTTS. Isolated deceleration noted. Will change position, IVF bolus and close observation.

## 2018-03-03 NOTE — PROGRESS NOTES
The patient has had 2 episodes of a prolonged variable, one last night and another this morning. Previous orders were for NST twice daily. Will now order continuous monitoring. Contractions are denied by the patient at this time.

## 2018-03-03 NOTE — PROGRESS NOTES
Ante Partum Progress Note    Pascale Sprague  30w0d    Assessment: 30w0d     Problem # 1:  Twins monochorionic/diamniotic. Early FS @16 with MFM___. FS + ECHO with MFM___. Mynor Rutherford fluid check___. Deann Uvaldo testing @ 32___. Federico Dines 34-37.       Admitted for increased monitoring due to concern for the possibility of developing twin-to-twin transfusion      Ultrasound 2/27 Terra Shields)  Twin A 38% with low/normal fluid; 2 vessel cord  Twin B 98% with polyhydramnios  Approximately 25% discrepancy   2/27 Twin A with periods of intermittent absent end diastolic flow       Ultrasound 3/1 Niki Barros) - essentially stable findings, but A did not have absent flow      S/p neuroprotective magnesium  S/p 2 doses BMZ    Yesterday evening had a bout of contractions with a small variable following one of the contractions, in Twin A's fetal monitoring(The fetal strip since 10pm 3/2 was reviewed). She was given terbutaline despite sleeping through these. There was a short time earlier when she stated that they were uncomfortable but eventually they disappeared. Again this morning at approximately 9:30 she had a short variable in Twin A's tracing that was more a change in baseline that returned very quickly to its previous reassuring variability and baseline. She has been on continuous monitoring since. The babies are very active and difficult to keep on the monitor but a very reassuring tracing has been recorded on both babies. We discussed the deep and repetitive nature of fetal heart variables, or the occurrence of late decelerations that do not resolve, as examples of what would require plan for immediate action. I have reassured her that we have not seen evidence of either. Her monitoring has been very reassuring. Will change her monitoring to tid for 1 hour each time and of course she can notify us if symptoms change for us to modify that. Movement precautions were reviewed.  She has requested a shower today and will allow that daily as needed.         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         Plan:  Discussed with Liv Jovel and Sujit Johansen - per Dr. Ruth Benitez recommendation, will monitor as an inpatient  Tid fetal heart rate monitoring for 1 hour in duration  Twice weekly BPP        Plan:  Continue hospitalization with hospitalized bedrest    Orders/Charges: Medium and Non Stress Test  X 2    Patient states she has no new complaints    Vitals:  Visit Vitals    /51 (BP 1 Location: Right arm, BP Patient Position: Lying left side)    Pulse (!) 104    Temp 98.6 °F (37 °C)    Resp 18    Ht 5' 7\" (1.702 m)    Wt 83.5 kg (184 lb)    LMP 2017 (Approximate)    SpO2 98%    Breastfeeding No    BMI 28.82 kg/m2     Temp (24hrs), Av.5 °F (36.9 °C), Min:98 °F (36.7 °C), Max:99.2 °F (37.3 °C)      Last 24hr Input/Output:    Intake/Output Summary (Last 24 hours) at 18 1426  Last data filed at 18 1044   Gross per 24 hour   Intake                0 ml   Output             1100 ml   Net            -1100 ml        Non stress test:  Reactive    Patient Vitals for the past 4 hrs: Mode Fetal Heart Rate Fetal Activity Variability Decelerations Accelerations RN Reviewed Strip?   18 1408 External 150 - - - - -   18 1407 External 100 - - Prolonged - -   18 1340 External 150 Present 6-25 BPM Variable Yes Yes   18 1254 External 150 - - - - -   18 1205 External 140 - - - - -   18 1200 External 145 Present 6-25 BPM None No Yes   18 1130 External 150 Present 6-25 BPM Variable No Yes   18 1100 External 145 Present 6-25 BPM None Yes Yes   18 1030 External 145 Present 6-25 BPM (!) Late;Variable Yes Yes    Patient Vitals for the past 4 hrs:    Mode Fetal Heart Rate Fetal Activity Variability Decelerations Accelerations RN Reviewed Strip?   03/03/18 1408 External 150 - - - - -   03/03/18 1407 External 100 - - Prolonged - -   03/03/18 1340 External 150 Present 6-25 BPM Variable Yes Yes   03/03/18 1254 External 150 - - - - -   03/03/18 1205 External 140 - - - - -   03/03/18 1200 External 145 Present 6-25 BPM None No Yes   03/03/18 1130 External 150 Present 6-25 BPM Variable No Yes   03/03/18 1100 External 145 Present 6-25 BPM None Yes Yes   03/03/18 1030 External 145 Present 6-25 BPM (!) Late;Variable Yes Yes        Uterine Activity: None     Exam:  Patient without distress.      Abdomen, fundus soft non-tender     Extremities, no redness or tenderness               Additional Exam: Deferred    Labs:     Lab Results   Component Value Date/Time    WBC 9.3 02/27/2018 07:29 PM    WBC 14.5 (H) 03/26/2015 03:00 AM    WBC 10.4 03/24/2015 09:50 AM    WBC 8.5 08/10/2012 03:30 PM    WBC 10.8 07/20/2012 04:25 PM    HGB 8.5 (L) 02/27/2018 07:29 PM    HGB 10.6 (L) 03/26/2015 03:00 AM    HGB 12.3 03/24/2015 09:50 AM    HGB 11.7 08/10/2012 03:30 PM    HGB 10.6 (L) 07/20/2012 04:25 PM    HCT 27.2 (L) 02/27/2018 07:29 PM    HCT 32.9 (L) 03/26/2015 03:00 AM    HCT 37.7 03/24/2015 09:50 AM    HCT 36.1 08/10/2012 03:30 PM    HCT 32.1 (L) 07/20/2012 04:25 PM    PLATELET 896 88/87/0078 07:29 PM    PLATELET 305 31/86/3919 03:00 AM    PLATELET 792 06/13/6692 09:50 AM    PLATELET 223 02/50/7167 03:30 PM    PLATELET 138 14/51/4637 04:25 PM       Recent Results (from the past 24 hour(s))   URINALYSIS W/ REFLEX CULTURE    Collection Time: 03/03/18  8:04 AM   Result Value Ref Range    Color YELLOW/STRAW      Appearance CLEAR CLEAR      Specific gravity 1.015 1.003 - 1.030      pH (UA) 7.0 5.0 - 8.0      Protein NEGATIVE  NEG mg/dL    Glucose NEGATIVE  NEG mg/dL    Ketone NEGATIVE  NEG mg/dL    Bilirubin NEGATIVE  NEG      Blood NEGATIVE  NEG      Urobilinogen 0.2 0.2 - 1.0 EU/dL    Nitrites NEGATIVE  NEG Leukocyte Esterase NEGATIVE  NEG      WBC 0-4 0 - 4 /hpf    RBC 0-5 0 - 5 /hpf    Epithelial cells FEW FEW /lpf    Bacteria NEGATIVE  NEG /hpf    UA:UC IF INDICATED CULTURE NOT INDICATED BY UA RESULT CNI

## 2018-03-03 NOTE — PROGRESS NOTES
6946 Dr. Chanell Caldwell notified of prolonged FHR decel on baby A and patient more aware of uterine activity at this time. Gloria Arceo, RNC at bedside, patient remains on continuous monitoring. Having periods of difficulty maintaining  continuous FHR tracings due to fetal movement.

## 2018-03-03 NOTE — PROGRESS NOTES
Bedside and Verbal shift change report given to AILYN Prakash RN (oncoming nurse) by BORIS Bryan RN (offgoing nurse). Report included the following information SBAR, Kardex, Intake/Output, MAR and Recent Results. Pt resting quietly in bed, monitoring continued-noted active fetuses. Pt states intermittent perineal pressure   0917:  Dr. Raven Bruce notified of pt's status, see Madeleine Washington RN note. 0930: Attempting to obtain continuous FHR  Tracing. 7518:  Monitors off, pt up to [facility] to void. 1150:  Notified Dr. Raven Bruce of no further occurrence of prolonged decels or recurrent decels. States pt may eat and may be off of monitor to eat. 1205:  Monitors off for pt to eat. 1319:  Dr. Raven Bruce at bedside, viewed FHR strip and pt's lab results. Discussed plan of care, orders received for GBS culture, aware of last CBC and sample drawn on 2/27/18, states not to redraw until pt is nearer to delivery. Orders received for intermittent fetal monitoring and pt may be up to shower. 1511:  Noted reactive FHR strip, occas decel, not recurrent. Monitors off, pt up to shower. 1607:  Pt finished her shower. GBS culture obtained and sent. 1610:  Pt resting in bed eating her banana. 1640:  Pt's  here and brought pt dinner. Eating her dinner now. 1721:  Pt c/o heartburn and nausea, medication given, see MAR.  1741:  Noted reactive FHR strip, pt states she needs to have BM, monitors off and pt up to BR.  1920:  Pt has been crying, states she is worried about her girls at home and misses them. States they are staying with her mother. Her  recently left for the night. Encouraged she is in the best place at this time for caring for her twin pregnancy and for herself. States no more nausea and heartburn. 1935:  Bedside and Verbal shift change report given to MINAL Goyal RN (oncoming nurse) by AILYN Prakash RN (offgoing nurse). Report included the following information SBAR, Kardex, Intake/Output, MAR and Recent Results.

## 2018-03-03 NOTE — PROGRESS NOTES
@0000 Bedside shift change report given to MINAL Marie RN (oncoming nurse) by Marlen Bales RN (offgoing nurse). Report included the following information SBAR, Kardex, Intake/Output, MAR and Recent Results. @65 RN remains at bedside trying to obtain [address], lots of fetal movement noted    @0130 Spoke to Dr. Sharon Goodwin regarding difficulty trying to trace FHT at this time, particularly baby B due to lots of fetal movement (and possibly also due to polyhydramnios). @5499 Spoke to Dr. Sharon Goodwin regarding contraction pattern. Patient sleeping. Orders received for SQ terbutaline.

## 2018-03-03 NOTE — PROGRESS NOTES
1942 Bedside and Verbal shift change report given to KIRSTEN Maurice, RN (oncoming nurse) by Lazaro Magana, MARIO (offgoing nurse). Report included the following information SBAR.     0641 Baby A with decels on NST. Call placed to Dr. Dot Lucero. Orders received for continuous monitoring and fluid bolus. 1210 Bedside and Verbal shift change report given to MINAL Neri RN (oncoming nurse) by Blaire Meier. Hank Mcdowell RN (offgoing nurse). Report included the following information SBAR.

## 2018-03-04 PROCEDURE — 65270000029 HC RM PRIVATE

## 2018-03-04 PROCEDURE — 74011250637 HC RX REV CODE- 250/637: Performed by: OBSTETRICS & GYNECOLOGY

## 2018-03-04 RX ORDER — RANITIDINE HCL 75 MG
75 TABLET ORAL 2 TIMES DAILY
Status: DISCONTINUED | OUTPATIENT
Start: 2018-03-04 | End: 2018-03-08 | Stop reason: HOSPADM

## 2018-03-04 RX ADMIN — CALCIUM CARBONATE (ANTACID) CHEW TAB 500 MG 200 MG: 500 CHEW TAB at 17:17

## 2018-03-04 RX ADMIN — DOCUSATE SODIUM 100 MG: 100 CAPSULE, LIQUID FILLED ORAL at 08:43

## 2018-03-04 RX ADMIN — Medication 10 ML: at 20:11

## 2018-03-04 RX ADMIN — Medication 325 MG: at 08:43

## 2018-03-04 RX ADMIN — Medication 5 ML: at 14:00

## 2018-03-04 RX ADMIN — CALCIUM CARBONATE (ANTACID) CHEW TAB 500 MG 200 MG: 500 CHEW TAB at 08:43

## 2018-03-04 RX ADMIN — RANITIDINE 75 MG: 75 TABLET, COATED ORAL at 20:11

## 2018-03-04 RX ADMIN — CALCIUM CARBONATE (ANTACID) CHEW TAB 500 MG 200 MG: 500 CHEW TAB at 12:38

## 2018-03-04 NOTE — PROGRESS NOTES
~1940: Bedside and Verbal shift change report given to MINAL Ahuja RN by Mallory Ashford. Kathleen Ross RN. Report included the following information CROW and MAR.

## 2018-03-04 NOTE — PROGRESS NOTES
Ante Partum Progress Note    Pascale Charlton  30w1d    Assessment: 30w1d        Problem # 1:  Twins monochorionic/diamniotic. Early FS @16 with MFM___. FS + ECHO with MFM___. Arely Kate fluid check___. Darlene [place] testing @ 32___. Malgorzata Seay 34-37.       Admitted for increased monitoring due to concern for the possibility of developing twin-to-twin transfusion      Ultrasound 2/27 Sharif Corea)  Twin A 38% with low/normal fluid; 2 vessel cord  Twin B 98% with polyhydramnios  Approximately 25% discrepancy   2/27 Twin A with periods of intermittent absent end diastolic flow       Ultrasound 3/1 Gabbie Mail) - essentially stable findings, but A did not have absent flow      S/p neuroprotective magnesium  S/p 2 doses BMZ    Monitoring has been very reassuring overnight and today. Babies very active. She also continues to have contractions that do not bother her. Only 2 have been painful.      Problem # 2:  Prev LTCS desires repeat: sched 4/17/18 (QCX-166.89) (BBY55-W16.211)      Problem # 3:  HSV-Valtrex @ 39 wks____ (KFA-596.63) (GDU43-T66.785)  no complaints       Problem # 4:  Anemia on FE pregnant (WXV-667.75) (LHH09-T20.013)  Continue Fe.         Plan:  Discussed with Liv Kate and Humera Nguyen - per Dr. Ryann Barlow recommendation, will monitor as an inpatient  Tid fetal heart rate monitoring for 1 hour in duration  Twice weekly BPP  Will allow her to have her own zantac since pepcid is not working      Plan:  Continue hospitalization with hospitalized bedrest    Orders/Charges: Low and Non Stress Test  X 2  Twin A Baseline 150 with moderate variability  Twin B Baseline 140 with moderate variability  Both have no decelerations and active fetal movement is noted and audible     Patient states she has no new complaints    Vitals:  Visit Vitals    /64    Pulse 98    Temp 98.4 °F (36.9 °C)    Resp 18    Ht 5' 7\" (1.702 m)    Wt 83.5 kg (184 lb)    LMP 07/30/2017 (Approximate)    SpO2 (!) 82%    Breastfeeding No    BMI 28.82 kg/m2     Temp (24hrs), Av.3 °F (36.8 °C), Min:98.2 °F (36.8 °C), Max:98.4 °F (36.9 °C)      Last 24hr Input/Output:    Intake/Output Summary (Last 24 hours) at 18 1205  Last data filed at 18 1436   Gross per 24 hour   Intake                0 ml   Output              150 ml   Net             -150 ml        Non stress test:  Reactive    Patient Vitals for the past 4 hrs: Mode Fetal Heart Rate Fetal Activity Variability Decelerations Accelerations RN Reviewed Strip?   18 1135 External 155 Present 6-25 BPM (!) Late Yes Yes   18 1110 External 165 - - - - -   18 1052 External 157 Present 6-25 BPM - - -   18 1008 - - Present - - - -    Patient Vitals for the past 4 hrs: Mode Fetal Heart Rate Fetal Activity Variability Decelerations Accelerations RN Reviewed Strip?   18 1135 External 155 Present 6-25 BPM (!) Late Yes Yes   18 1110 External 165 - - - - -   18 1052 External 157 Present 6-25 BPM - - -   18 1008 - - Present - - - -        Uterine Activity:irregular and non-painful    Exam:  Patient without distress.      Abdomen, fundus soft non-tender     Extremities, no redness or tenderness               Additional Exam: Deferred    Labs:     Lab Results   Component Value Date/Time    WBC 9.3 2018 07:29 PM    WBC 14.5 (H) 2015 03:00 AM    WBC 10.4 2015 09:50 AM    WBC 8.5 08/10/2012 03:30 PM    WBC 10.8 2012 04:25 PM    HGB 8.5 (L) 2018 07:29 PM    HGB 10.6 (L) 2015 03:00 AM    HGB 12.3 2015 09:50 AM    HGB 11.7 08/10/2012 03:30 PM    HGB 10.6 (L) 2012 04:25 PM    HCT 27.2 (L) 2018 07:29 PM    HCT 32.9 (L) 2015 03:00 AM    HCT 37.7 2015 09:50 AM    HCT 36.1 08/10/2012 03:30 PM    HCT 32.1 (L) 2012 04:25 PM    PLATELET 131  07:29 PM    PLATELET 281  03:00 AM    PLATELET 340  09:50 AM    PLATELET 955 15/56/3838 03:30 PM    PLATELET 546  04:25 PM No results found for this or any previous visit (from the past 24 hour(s)).

## 2018-03-04 NOTE — PROGRESS NOTES
Bedside and Verbal shift change report given to INOCENTE Garibay RN (oncoming nurse) by MINAL Lagunas RN (offgoing nurse). Report included the following information SBAR, MAR and Recent Results.

## 2018-03-04 NOTE — PROGRESS NOTES
Bedside shift change report given to Aubree Davenport RN (oncoming nurse) by Nikhil Sweet RN (offgoing nurse). Report included the following information SBAR, Kardex, Intake/Output and MAR.

## 2018-03-05 PROCEDURE — 76819 FETAL BIOPHYS PROFIL W/O NST: CPT | Performed by: OBSTETRICS & GYNECOLOGY

## 2018-03-05 PROCEDURE — 74011250637 HC RX REV CODE- 250/637: Performed by: OBSTETRICS & GYNECOLOGY

## 2018-03-05 PROCEDURE — 76816 OB US FOLLOW-UP PER FETUS: CPT | Performed by: OBSTETRICS & GYNECOLOGY

## 2018-03-05 PROCEDURE — 65270000029 HC RM PRIVATE

## 2018-03-05 PROCEDURE — 76820 UMBILICAL ARTERY ECHO: CPT | Performed by: OBSTETRICS & GYNECOLOGY

## 2018-03-05 RX ADMIN — Medication 10 ML: at 06:55

## 2018-03-05 RX ADMIN — Medication 10 ML: at 20:11

## 2018-03-05 RX ADMIN — RANITIDINE 75 MG: 75 TABLET, COATED ORAL at 09:18

## 2018-03-05 RX ADMIN — Medication 10 ML: at 17:46

## 2018-03-05 RX ADMIN — RANITIDINE 75 MG: 75 TABLET, COATED ORAL at 20:11

## 2018-03-05 RX ADMIN — Medication 325 MG: at 09:18

## 2018-03-05 RX ADMIN — CALCIUM CARBONATE (ANTACID) CHEW TAB 500 MG 200 MG: 500 CHEW TAB at 09:18

## 2018-03-05 RX ADMIN — CALCIUM CARBONATE (ANTACID) CHEW TAB 500 MG 200 MG: 500 CHEW TAB at 18:07

## 2018-03-05 RX ADMIN — DOCUSATE SODIUM 100 MG: 100 CAPSULE, LIQUID FILLED ORAL at 09:18

## 2018-03-05 NOTE — PROGRESS NOTES
Bedside shift change report given to Bishop See (oncoming nurse) by Reji Christianson RN (offgoing nurse). Report included the following information SBAR, Kardex, Intake/Output and MAR.     1500 pt napping in room. No distress noted. Bedside shift change report given to [address] [place] [address],[address] (oncoming nurse) by Ang Martinez RN (offgoing nurse). Report included the following information SBAR, Kardex and MAR.

## 2018-03-05 NOTE — PROGRESS NOTES
M - Please see the Ultrasound report / consult note to be entered into this patient's record as a scanned document from my office. A text copy of this note is also provided below for convenience. Mattie Feldman M.D., Ph.D.  Michael Baltazar    -------------------------------------------    Indication: Twins Mono/Di 3rd Tri O30.033; polyhydramnios; abnormal umbilical artery Doppler. ____________________________________________________________________________  History: Age: 23 years. : 3 Para: 2.  ____________________________________________________________________________  Dating:  Stated EDC:  EDC: 18 GA by stated EDC: 30w2d  Best Overall Assessment: 18 EDC: 18 Assessed GA: 30w2d  The Best Overall Assessment is based on the stated EDC.  ____________________________________________________________________________  General Evaluation:  Fetus 1:  Fetal heart activity: present. Fetal heart rate: 158 bpm.   Presentation: BREECH, Maternal right side. Fetal movement: visible. Amniotic Fluid: normal. Maximal vertical pocket 4.0 cm. Placenta: posterior. Fetus 2:  Fetal heart activity: present. Fetal heart rate: 144 bpm.   Presentation: vertex. Fetal movement: visible. Amniotic Fluid: polyhydramnios. Maximal vertical pocket 15.7 cm. Placenta: posterior. ____________________________________________________________________________  Anatomy Scan:  Twin gestation. Fetus 1:     Fetal Anatomy:  Visualized with normal appearance: gastrointestinal tract, kidneys, bladder. Fetus 2:     Fetal Anatomy:  Visualized with normal appearance: gastrointestinal tract, kidneys, bladder. Summary of Ultrasound Findings:  Transabdominal US. U/S machine: Fantoo E8 Expert.    Impression: Fetal anatomy was not evaluated in detail.    ____________________________________________________________________________  Fetal Wellbeing Assessment:  Fetus 1:  Amniotic fluid: normal. MVP: 4.0 cm. Biophysical Profile: Fetal body movements: normal (2), Fetal tone: normal (2), Fetal breathing movements: normal (2), Amniotic fluid volume: normal (2). Score 8 / 8. Fetus 2:  Amniotic fluid: polyhydramnios. MVP: 15.7 cm. Biophysical Profile: Fetal body movements: normal (2), Fetal tone: normal (2), Fetal breathing movements: normal (2), Amniotic fluid volume: normal (2). Score 8 / 8. Summary: Reassuring fetal status. ____________________________________________________________________________  Doppler:  Fetal Doppler:  Fetus 1:  Umbilical Artery: PS -70.1 cm/s    ED -8.16 cm/s    EDF present   S/D ratio 4.21     RI 0.76     PI 1.23     TAMX -20.03 cm/s   Impression: Normal with mildly elevated S/D ratio (>95%ile) with normal RI and PI. Rare cycles of absence/reversed end-diastolic flow are noted. Fetus 2:  Umbilical Artery: PS 63.8 cm/s    ED 37.20 cm/s    EDF present   S/D ratio 2.27     RI 0.55     PI 0.80     TAMX 58.98 cm/s   Impression: Normal umbilical artery Doppler blood flow indices and waveforms. U/S Machine: Tipjoy E8 Expert.  ____________________________________________________________________________  Report Summary:  Impression: A follow-up evaluation of twins was performed for fetal evaluation. Biophysical profile and umbilical artery Doppler studies are performed for twins to evaluate fetal well-being. Ms. Chris Hoyos has a monochorionic diamnionic pregnancy with the following findings:      (a) Fetal growth discrepancy (approximately 25-30% discordance),     (b) Twin A -- Normal fetal growth (~40%ile), subjectively reduced amniotic fluid, single umbilical artery, and intermittently abnormal umbilical artery Doppler velocity profiles, and    (c) Twin B - Large-for-gestational age (>90-95%ile), rapidly increasing polyhydramnios, and intrahepatic umbilical vein varix.   She is hospitalized to evaluate the changing fetal status and to potentially prepare for indicated  delivery or developing twin-twin transfusion syndrome. She has received  steroids. She has no major medical problems. She reports good fetal movement. She indicates that she has increasing abdominal discomfort and occasional contractions. She denies bleeding, and loss of fluid. A viable twin IUP is observed. Twin assignment is based on fetal size and amniotic fluid appearance. Placental morphology and membrane appearance is consistent with monochorionic-diamnionic placentation. Twin A:  BREECH, female, maternal right, posterior placentation. Twin B:  Cephalic presentation (unstable lie), posterior placentation. The evaluated fetal anatomy appears normal as indicated above. A complete fetal anatomy is not performed. Normal fetal movements are observed for each twin. There is no evidence of twin-twin transfusion syndrome at this time. Fetal membranes are applied to body of Twin A, so this twin subjectively appears to have reduced amniotic fluid. However multiple amniotic fluid pocket measurements for Twin A are normal.  The quality of the amniotic is mildly echodense. Amniotic fluid measurements are consistent with polyhydramnios for Twin B (MVP > 15 cm), which is increased from last week. Twin B's amniotic fluid is echolucent. Umbilical artery flow evaluation for Twin A (single umbilical artery) is essentially unchanged from her prior evaluations. Analysis shows almost all cardiac cycles with normal flow patterns. There are infrequent cycles demonstrating increased flow resistance. Of note, there are rare, non-repetitive cycles suggesting absent or reversed end-diastolic flow; the clinical significance of this finding is uncertain at this time. Fetal assessment is reassuring for each twin. See BPP scores and umbilical artery Doppler reports above. Recommendations: 1. Continue inpatient status for now. 2.  At least daily FHR monitoring.   3. Weekly vs. twice-weekly MFM evaluation is planned at this point to evaluate for changes in amniotic fluid status or for features of twin-twin transfusion syndrome. Additional follow-up as clinically indicated. 4.  Delivery timing is not able to determined at this time; Ms. Vasu Nava is advised that  birth is likely in this pregnancy. 5.  Delivery route is anticpated to be  section. ____________________________________________________________________________  Fetal Growth Overview:  Fetus 1  Date GA BPD [mm] HC [mm] AC [mm] FL [mm] HUM [mm] EFW   18 29 + 5 74.8 47th 270.0 12th 253.3 38th 55.5 23rd 47.5 11th 1396g , 3 lbs 1 ozs 47th%%  18 30 + 2 ... ... ... ... ... ... ... Fetus 2  Date GA BPD [mm] HC [mm] AC [mm] FL [mm] HUM [mm] EFW   18 29 + 5 77.2 77th 286.0 66th 293.5 >95th 62.4 94th 53.2 79th 2014g , 4 lbs 7 ozs 88th%%  18 30 + 2 ... ... ... ... ... ... . ..

## 2018-03-05 NOTE — PROGRESS NOTES
Ante Partum Progress Note    Lorrielda Rileypepe  30w2d    Assessment: 30w2d       Problem # 1:  Twins monochorionic/diamniotic. Early FS @16 with MFM___. FS + ECHO with MFM___. Mynor Rutherford fluid check___. Deann Kirkland testing @ 32___. Federico Waller 34-37.       Admitted for increased monitoring due to concern for the possibility of developing twin-to-twin transfusion      Ultrasound  Terra Shields)  Twin A 38% with low/normal fluid; 2 vessel cord  Twin B 98% with polyhydramnios  Approximately 25% discrepancy    Twin A with periods of intermittent absent end diastolic flow       Ultrasound 3/1 Niki Barros) - essentially stable findings, but A did not have absent flow      S/p neuroprotective magnesium  S/p 2 doses BMZ     Reassuring surveillance      Problem # 2:  Prev LTCS desires repeat: sched 18 (CTY-000.03) (ENB50-U80.211)      Problem # 3:  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       Plan:  Continue hospitalization  TID NST x 1 hour  BPP twice weekly (due today)  Plan for NICU consult after today's MFM visit  Repeat C/S for delivery/distress    Orders/Charges: Medium and NST x 3 - twins    Patient states she has no new complaints    Vitals:  Visit Vitals    /63    Pulse 95    Temp 98.3 °F (36.8 °C)    Resp 16    Ht 5' 7\" (1.702 m)    Wt 83.5 kg (184 lb)    LMP 2017 (Approximate)    SpO2 98%    Breastfeeding No    BMI 28.82 kg/m2     Temp (24hrs), Av.2 °F (36.8 °C), Min:98 °F (36.7 °C), Max:98.3 °F (36.8 °C)      Last 24hr Input/Output:  No intake or output data in the 24 hours ending 18 1111     Non stress test:  Reactive x 2    An NST was performed and was reactive for babyA and reassuring for baby B. The baseline FHR was 135/140. Moderate baseline  variability was noted x2.  Accelerations of sufficient amplitude and duration were noted for baby A, baby B difficult to continuously monitor due to significant movement and no accelerations seen. There were no decelerations noted x 2. Uterine Activity: None     Exam:  Patient without distress. Abdomen, fundus soft non-tender     Extremities, no redness or tenderness               Additional Exam: Deferred    Labs:     Lab Results   Component Value Date/Time    WBC 9.3 02/27/2018 07:29 PM    WBC 14.5 (H) 03/26/2015 03:00 AM    WBC 10.4 03/24/2015 09:50 AM    WBC 8.5 08/10/2012 03:30 PM    WBC 10.8 07/20/2012 04:25 PM    HGB 8.5 (L) 02/27/2018 07:29 PM    HGB 10.6 (L) 03/26/2015 03:00 AM    HGB 12.3 03/24/2015 09:50 AM    HGB 11.7 08/10/2012 03:30 PM    HGB 10.6 (L) 07/20/2012 04:25 PM    HCT 27.2 (L) 02/27/2018 07:29 PM    HCT 32.9 (L) 03/26/2015 03:00 AM    HCT 37.7 03/24/2015 09:50 AM    HCT 36.1 08/10/2012 03:30 PM    HCT 32.1 (L) 07/20/2012 04:25 PM    PLATELET 969 23/13/0587 07:29 PM    PLATELET 598 31/26/5619 03:00 AM    PLATELET 687 00/61/3052 09:50 AM    PLATELET 825 67/87/5293 03:30 PM    PLATELET 419 29/73/3022 04:25 PM       No results found for this or any previous visit (from the past 24 hour(s)).

## 2018-03-05 NOTE — PROGRESS NOTES
~8408: Bedside and Verbal shift change report given to MINAL Green RN by LINDA Lepe RN. Report included the following information SBAR, MAR, Accordion and Recent Results.

## 2018-03-06 LAB
BACTERIA SPEC CULT: NORMAL
GRBS, EXTERNAL: NEGATIVE
SERVICE CMNT-IMP: NORMAL

## 2018-03-06 PROCEDURE — 65270000029 HC RM PRIVATE

## 2018-03-06 PROCEDURE — 74011250637 HC RX REV CODE- 250/637: Performed by: OBSTETRICS & GYNECOLOGY

## 2018-03-06 RX ADMIN — Medication 325 MG: at 10:44

## 2018-03-06 RX ADMIN — ACETAMINOPHEN 650 MG: 325 TABLET, FILM COATED ORAL at 20:00

## 2018-03-06 RX ADMIN — Medication 10 ML: at 06:52

## 2018-03-06 RX ADMIN — CALCIUM CARBONATE (ANTACID) CHEW TAB 500 MG 200 MG: 500 CHEW TAB at 10:44

## 2018-03-06 RX ADMIN — RANITIDINE 75 MG: 75 TABLET, COATED ORAL at 10:46

## 2018-03-06 RX ADMIN — Medication 10 ML: at 15:17

## 2018-03-06 RX ADMIN — DOCUSATE SODIUM 100 MG: 100 CAPSULE, LIQUID FILLED ORAL at 10:44

## 2018-03-06 NOTE — PROGRESS NOTES
~1940: Bedside and Verbal shift change report given to MINAL Granda RN by LINDA Banks RN. Report included the following information SBAR, MAR, Accordion and Recent Results. [email or web address]: Bedside and Verbal shift change report given to N. Floyce Boeck, RN by MINAL Granda RN. Report included the following information SBAR, MAR and Accordion.

## 2018-03-06 NOTE — PROGRESS NOTES
Ante Partum Progress Note    Pascale Charlton  30w3d    Assessment: 30w3d        Problem # 1:  Twins monochorionic/diamniotic; admitted for increased monitoring due to concern for the possibility of developing twin-to-twin transfusion      -Ultrasound  Trinity Springan)  Twin A 38% with low/normal fluid; 2 vessel cord  Twin B 98% with polyhydramnios  Approximately 25% discrepancy    Twin A with periods of intermittent absent end diastolic flow       -Ultrasound 3/1 Jelly Bowers) - essentially stable findings, but A did not have absent flow    -Ultrasound 3/5 Jelly Bowers) - B with increased polyhydramnios,  BPP x2 with reassuring cord dopplers x2      S/p neuroprotective magnesium  S/p 2 doses BMZ      Reassuring surveillance      Problem # 2:  Prev LTCS desires repeat: sched 18 (RYT-062.95) (XSW68-Z20.211)      Problem # 3:  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        Plan:    Continue hospitalization  TID NST x 1 hour   Encouraged PO hydration today due to complaint of increased contractions with low threshold to assess further  BPP twice weekly (next due on Thursday)  NICU consult pending  Repeat C/S for delivery/distress     Orders/Charges: Medium and NST x 3 - twins    Subjective:  Patient complains of increased contractions today. They have been irregular since she woke up this morning at 6am - at that time were every 5-7 minutes, now about every 10. Denies spotting or mucus discharge. +FM x2.       Vitals:  Visit Vitals    /55    Pulse 85    Temp 98.5 °F (36.9 °C)    Resp 16    Ht 5' 7\" (1.702 m)    Wt 83.5 kg (184 lb)    LMP 2017 (Approximate)    SpO2 98%    Breastfeeding No    BMI 28.82 kg/m2     Temp (24hrs), Av.5 °F (36.9 °C), Min:98.4 °F (36.9 °C), Max:98.5 °F (36.9 °C)      Last 24hr Input/Output:  No intake or output data in the 24 hours ending 18 9350 Non stress test:    140, moderate variability, positive accelerations, no decelerations  145, moderate variability, positive accelerations, no decelerations    Tocometry: irregular contractions with uterine irritability between    Patient Vitals for the past 4 hrs: Mode Fetal Heart Rate   03/06/18 1407 External 155    Patient Vitals for the past 4 hrs: Mode Fetal Heart Rate   03/06/18 1407 External 155        Exam:   Patient without distress. Abdomen, fundus soft non-tender, mildly firm with a contraction     Extremities, no redness or tenderness               Additional Exam: Deferred for now    Labs:     Lab Results   Component Value Date/Time    WBC 9.3 02/27/2018 07:29 PM    WBC 14.5 (H) 03/26/2015 03:00 AM    WBC 10.4 03/24/2015 09:50 AM    WBC 8.5 08/10/2012 03:30 PM    WBC 10.8 07/20/2012 04:25 PM    HGB 8.5 (L) 02/27/2018 07:29 PM    HGB 10.6 (L) 03/26/2015 03:00 AM    HGB 12.3 03/24/2015 09:50 AM    HGB 11.7 08/10/2012 03:30 PM    HGB 10.6 (L) 07/20/2012 04:25 PM    HCT 27.2 (L) 02/27/2018 07:29 PM    HCT 32.9 (L) 03/26/2015 03:00 AM    HCT 37.7 03/24/2015 09:50 AM    HCT 36.1 08/10/2012 03:30 PM    HCT 32.1 (L) 07/20/2012 04:25 PM    PLATELET 558 03/41/2891 07:29 PM    PLATELET 733 24/89/3778 03:00 AM    PLATELET 103 47/29/2946 09:50 AM    PLATELET 482 41/30/9102 03:30 PM    PLATELET 758 23/18/8038 04:25 PM       No results found for this or any previous visit (from the past 24 hour(s)).

## 2018-03-06 NOTE — PROGRESS NOTES
1535 Bedside report received from ADAN Shaw RN. History, allergies, medications, and plan of care reviewed. Patient resting in the bed and PO hydrating. No complaints at this time. Will continue to monitor. 2140 Patient on the monitor for over an hour. Difficulty tracing both babies due to fetal activity. Mother feels confident that the monitoring was adequate at this time. 2320 Verbal report given to INOCENTE Neely RN. History, allergies, medications, and plan of care reviewed. Patient asleep in the bed.

## 2018-03-06 NOTE — PROGRESS NOTES
Reviewed Dr. Jean Gonzalez note. Increasing poly for twin B, but otherwise stable and no current evidence of twin-twin transfusion. After review of above, NICU consult ordered/called. But given anticipated transport this evening, it may get put off until tomorrow. No current concern for immediate need so tomorrow would be reasonable. Will call NICU should unanticipated concern arise tonight.

## 2018-03-06 NOTE — PROGRESS NOTES
0740-Bedside shift change report given to ADAN ScottRN (oncoming nurse) by MINAL Lagunas RN (offgoing nurse). Report included the following information SBAR and Recent Results. 1535-Bedside shift change report given to INOCENTE Obrien RN (oncoming nurse) by ADAN Scott RN (offgoing nurse). Report included the following information SBAR, MAR and Recent Results.

## 2018-03-07 LAB
APPEARANCE UR: CLEAR
BACTERIA URNS QL MICRO: NEGATIVE /HPF
BILIRUB UR QL: NEGATIVE
CLUE CELLS VAG QL WET PREP: NORMAL
COLOR UR: NORMAL
EPITH CASTS URNS QL MICRO: NORMAL /LPF
FIBRONECTIN FETAL VAG QL: NEGATIVE
GLUCOSE UR STRIP.AUTO-MCNC: NEGATIVE MG/DL
HGB UR QL STRIP: NEGATIVE
HYALINE CASTS URNS QL MICRO: NORMAL /LPF (ref 0–5)
KETONES UR QL STRIP.AUTO: NEGATIVE MG/DL
KOH PREP SPEC: NORMAL
LEUKOCYTE ESTERASE UR QL STRIP.AUTO: NEGATIVE
NITRITE UR QL STRIP.AUTO: NEGATIVE
PH UR STRIP: 6.5 [PH] (ref 5–8)
PROT UR STRIP-MCNC: NEGATIVE MG/DL
RBC #/AREA URNS HPF: NORMAL /HPF (ref 0–5)
SERVICE CMNT-IMP: NORMAL
SP GR UR REFRACTOMETRY: 1.02 (ref 1–1.03)
T VAGINALIS VAG QL WET PREP: NORMAL
UA: UC IF INDICATED,UAUC: NORMAL
UROBILINOGEN UR QL STRIP.AUTO: 0.2 EU/DL (ref 0.2–1)
WBC URNS QL MICRO: NORMAL /HPF (ref 0–4)

## 2018-03-07 PROCEDURE — 81001 URINALYSIS AUTO W/SCOPE: CPT | Performed by: OBSTETRICS & GYNECOLOGY

## 2018-03-07 PROCEDURE — 74011250637 HC RX REV CODE- 250/637: Performed by: OBSTETRICS & GYNECOLOGY

## 2018-03-07 PROCEDURE — 74011250636 HC RX REV CODE- 250/636: Performed by: OBSTETRICS & GYNECOLOGY

## 2018-03-07 PROCEDURE — 77030012890

## 2018-03-07 PROCEDURE — 65270000029 HC RM PRIVATE

## 2018-03-07 PROCEDURE — 87210 SMEAR WET MOUNT SALINE/INK: CPT | Performed by: OBSTETRICS & GYNECOLOGY

## 2018-03-07 PROCEDURE — 82731 ASSAY OF FETAL FIBRONECTIN: CPT | Performed by: OBSTETRICS & GYNECOLOGY

## 2018-03-07 RX ORDER — SODIUM CHLORIDE, SODIUM LACTATE, POTASSIUM CHLORIDE, CALCIUM CHLORIDE 600; 310; 30; 20 MG/100ML; MG/100ML; MG/100ML; MG/100ML
500 INJECTION, SOLUTION INTRAVENOUS ONCE
Status: COMPLETED | OUTPATIENT
Start: 2018-03-07 | End: 2018-03-07

## 2018-03-07 RX ADMIN — Medication 5 ML: at 10:27

## 2018-03-07 RX ADMIN — RANITIDINE 75 MG: 75 TABLET, COATED ORAL at 10:20

## 2018-03-07 RX ADMIN — Medication 10 ML: at 20:38

## 2018-03-07 RX ADMIN — DOCUSATE SODIUM 100 MG: 100 CAPSULE, LIQUID FILLED ORAL at 08:41

## 2018-03-07 RX ADMIN — Medication 10 ML: at 16:51

## 2018-03-07 RX ADMIN — SODIUM CHLORIDE, SODIUM LACTATE, POTASSIUM CHLORIDE, AND CALCIUM CHLORIDE 500 ML: 600; 310; 30; 20 INJECTION, SOLUTION INTRAVENOUS at 16:51

## 2018-03-07 RX ADMIN — CALCIUM CARBONATE (ANTACID) CHEW TAB 500 MG 200 MG: 500 CHEW TAB at 08:41

## 2018-03-07 RX ADMIN — ACETAMINOPHEN 650 MG: 325 TABLET, FILM COATED ORAL at 16:30

## 2018-03-07 RX ADMIN — Medication 325 MG: at 08:41

## 2018-03-07 RX ADMIN — RANITIDINE 75 MG: 75 TABLET, COATED ORAL at 20:39

## 2018-03-07 NOTE — PROGRESS NOTES
CTSP for increased pain with contractions. Patient unsure if more frequent or not, but feels they are more uncomfortable. No urinary frequency/dysuria. No change in vvaginal discharge/itch/burn.   No VB    NST- reactive x2   A - mod variability, +accels, no decels    B - mod variability, +accels, no decels  toco- q6-8min contractions    Spec - +white discharge  Cvx - CL/50%    - fluid bolus  - WM/KOH - neg  - UA - pending  - FFN - neg  - nifedipine x1 dose if still magdi after fluid bolus    **after fluid bolus, contractions have resolved per pt report

## 2018-03-07 NOTE — CONSULTS
Neonatology Antepartum Consultation    Name: Amber Jefry Leonardo Georgette Hernandez Record Number: [id number]      YOB: 1995     Today's Date: 2018                                                                 Date of Consultation:  2018  Time: 4:29 PM  Attending MD: Lev Villa  Referring Physician: Alma Ochoa  Reason for Consultation:  prematurity    Subjective:     Age: 23 y.o.  Jefe Tavares  Para:   Gestation age: 30w3d      Maternal steroids:  yes     Objective:     Medications:   Current Facility-Administered Medications   Medication Dose Route Frequency    raNITIdine (ZANTAC) tablet 75 mg - PATIENT SUPPLIED  75 mg Oral BID    calcium carbonate (TUMS) chewable tablet 200 mg [elemental]  200 mg Oral TID WITH MEALS    polyethylene glycol (MIRALAX) packet 17 g  17 g Oral DAILY PRN    aluminum-magnesium hydroxide (MAALOX) oral suspension 30 mL  30 mL Oral QID PRN    docusate sodium (COLACE) capsule 100 mg  100 mg Oral DAILY    sodium chloride (NS) flush 5-10 mL  5-10 mL IntraVENous Q8H    sodium chloride (NS) flush 5-10 mL  5-10 mL IntraVENous PRN    zolpidem (AMBIEN) tablet 5 mg  5 mg Oral QHS PRN    acetaminophen (TYLENOL) tablet 650 mg  650 mg Oral Q4H PRN    ondansetron (ZOFRAN ODT) tablet 4 mg  4 mg Oral Q8H PRN    diphenhydrAMINE (BENADRYL) capsule 25 mg  25 mg Oral Q4H PRN    magnesium sulfate 10 gm/250 mL D5W infusion  2 g/hr IntraVENous CONTINUOUS    ferrous sulfate tablet 325 mg  325 mg Oral DAILY    lactated Ringers infusion  75 mL/hr IntraVENous CONTINUOUS     Rupture of Membrane: Membranes  Membrane Status: Intact   Meconium Stained:       Data Review:  Lab:   Lab Results   Component Value Date/Time    ABO/Rh(D) O NEG 08/10/2012 03:30 PM    Antibody screen POS 08/10/2012 03:30 PM    Rubella, External immune 10/19/2017    GrBStrep, External negative 2015    HBsAg, External negative 10/19/2017    HIV, External nonreactive 10/19/2017    RPR, External non reactive 2012 12:00 PM    Gonorrhea, External negative 10/19/2017    Chlamydia, External negative 10/19/2017    ABO,Rh O negative 2012 12:00 PM    Antibody screen, External negative 2018     Last Ultrasound:  Last Estimated Fetal Weight:  Twin A 38%, Twin B 98%     Assessment: Requested  consult for mono/di female twins at 27 4/7 weeks, admitted for discordant growth and concerns for twin to twin transfusion. MOB 22 yo  with pregnancy remarkable for hx HSV, discordant grwoth of twins, Twin A 8% with intermittent  absent end diastolic flow, twin B LGA at 98% with polyhydramnios. Mother received BMZ X 2 and neuroprotective Magnesium. Plan repeat . Mother has had an infant in the NICU at [facility]. .  Active Problems:    Pregnancy (3/25/2015)        OB Concerns/Plan:     I discussed risks of  birth including respiratory distress, IVH, feeding problems, ROP, developmental delay and infection. Also reviewed what to expect at delivery, NICU coverage, and possible procedures needed. She plans to breastfeed but if infants are able to eat before her milk is available is OK with formula feeds. Mother very receptive to the consult, questions answered. Consult approx 20 minutes.       [x]    Explained NICU coverage and team approach  [x]    Answered question  [x]    Offered tour  [x]    Obtained consents  [x]    Discussed Benefits of Breast Feeding  []    Discussed the Parent Progress note      Signed: MIRTHA TurnerP-BC  Date: 3/7/2018  Time: 1644

## 2018-03-07 NOTE — PROGRESS NOTES
2320- Bedside and Verbal shift change report given to INOCENTE Jiang Cap (oncoming nurse) by INOCENTE Hester RN (offgoing nurse). Report included the following information SBAR, Intake/Output, MAR and Recent Results.      2320- pt resting comfortably at this time

## 2018-03-07 NOTE — PROGRESS NOTES
1945: Bedside shift change report given to EILEEN Flores RN (oncoming nurse) by INOCENTE Neely RN (offgoing nurse). Report included the following information SBAR, MAR and Recent Results. Pt laying in bed, says that she has been feeling contractions, but not very painful now. Feeling a \"little pressure and cramping. \" Contractions palpate mild. 1115: Fetal heart tracing and uterine contractions viewed by Dr. Osmar Mosqueda. 1430: Dr. Osmar Mosqueda at bedside with ultrasound to locate babies for NST. Dr. Osmar Mosqueda notified that the patient's contractions are getting stronger, per patient. Will do vaginal exam and FFN after NST. 1445: Called to notify the NICU that pt is still in need of a consult. 1550: Bedside and Verbal shift change report given to BORIS Weir RN (oncoming nurse) by EILEEN Flores RN (offgoing nurse). Report included the following information SBAR, Procedure Summary, MAR and Recent Results.

## 2018-03-07 NOTE — PROGRESS NOTES
Ante Partum Progress Note    Jaden Charlton  30w4d    Assessment: 30w4d   Problem # 1:  Twins monochorionic/diamniotic; admitted for increased monitoring due to concern for the possibility of developing twin-to-twin transfusion      -Ultrasound  Eligio Bones)  Twin A 38% with low/normal fluid; 2 vessel cord  Twin B 98% with polyhydramnios  Approximately 25% discrepancy    Twin A with periods of intermittent absent end diastolic flow       -Ultrasound 3/1 Bernardo Genoveva) - essentially stable findings, but A did not have absent flow     -Ultrasound 3/5 Bernardo Genoveva) - B with increased polyhydramnios,  BPP x2 with reassuring cord dopplers x2      S/p neuroprotective magnesium  S/p 2 doses BMZ      Reassuring surveillance       Problem # 2:  Prev LTCS desires repeat: sched 18 (GOE-671.79) (JFV20-J86.211)      Problem # 3:  HSV-Valtrex @ 39 wks____ (PDM-075.57) (WIX15-R65.313)  no complaints       Problem # 4:  Anemia on FE pregnant (HUX-898.59) (JVB28-I70.013)  Continue Fe.         Plan:    Continue hospitalization  TID NST x 1 hour   BPP twice weekly (next due on Thursday)  NICU consult today  Repeat C/S for delivery/distress  3/7- PT consult for back/hip pain with bedrest.    Orders/Charges: Medium and Non Stress Test    Patient states she has no new complaints. occ cramping - no change. No bleeding/LOF/mucous discharge. +Right RL pain last night, none since. +having regular BMs. Back and hips quite painful in bed.     Vitals:  Visit Vitals    /61 (BP 1 Location: Left arm, BP Patient Position: At rest)    Pulse 85    Temp 97.8 °F (36.6 °C)    Resp 16    Ht 5' 7\" (1.702 m)    Wt 83.5 kg (184 lb)    LMP 2017 (Approximate)    SpO2 98%    Breastfeeding No    BMI 28.82 kg/m2     Temp (24hrs), Av.8 °F (36.6 °C), Min:97.8 °F (36.6 °C), Max:97.8 °F (36.6 °C)      Last 24hr Input/Output:  No intake or output data in the 24 hours ending 18 1100     Non stress test:  Reactive x2    Patient Vitals for the past 4 hrs: Mode Fetal Heart Rate Fetal Activity Variability Decelerations Accelerations RN Reviewed Strip? Non Stress Test   03/07/18 0930 External - - - - - - -   03/07/18 0900 External 135 Present 6-25 BPM None Yes Yes Reactive   03/07/18 0848 External 135 Present - - - - -    Patient Vitals for the past 4 hrs: Mode Fetal Heart Rate Fetal Activity Variability Decelerations Accelerations RN Reviewed Strip? Non Stress Test   03/07/18 0930 External - - - - - - -   03/07/18 0900 External 135 Present 6-25 BPM None Yes Yes Reactive   03/07/18 0848 External 135 Present - - - - -        Uterine Activity: irregular contractions (pt reports feeling them regularly while on her back for monitoring, but resolve when able to lay on her side)     Exam:  Patient without distress. Abdomen, fundus soft non-tender     Extremities, no redness or tenderness               Additional Exam: Deferred    Labs:     Lab Results   Component Value Date/Time    WBC 9.3 02/27/2018 07:29 PM    WBC 14.5 (H) 03/26/2015 03:00 AM    WBC 10.4 03/24/2015 09:50 AM    WBC 8.5 08/10/2012 03:30 PM    WBC 10.8 07/20/2012 04:25 PM    HGB 8.5 (L) 02/27/2018 07:29 PM    HGB 10.6 (L) 03/26/2015 03:00 AM    HGB 12.3 03/24/2015 09:50 AM    HGB 11.7 08/10/2012 03:30 PM    HGB 10.6 (L) 07/20/2012 04:25 PM    HCT 27.2 (L) 02/27/2018 07:29 PM    HCT 32.9 (L) 03/26/2015 03:00 AM    HCT 37.7 03/24/2015 09:50 AM    HCT 36.1 08/10/2012 03:30 PM    HCT 32.1 (L) 07/20/2012 04:25 PM    PLATELET 741 43/86/2109 07:29 PM    PLATELET 544 79/06/0967 03:00 AM    PLATELET 576 10/08/7783 09:50 AM    PLATELET 536 24/63/6097 03:30 PM    PLATELET 906 86/05/2136 04:25 PM       No results found for this or any previous visit (from the past 24 hour(s)).

## 2018-03-07 NOTE — PROGRESS NOTES
1601 Bedside, Verbal and Written shift change report given to BORIS Hart RN (oncoming nurse) by Edna Reeves (offgoing nurse). Report included the following information SBAR, Kardex, Intake/Output, MAR, Accordion, Recent Results, Med Rec Status and Alarm Parameters . 1606 NICU NP at bedside for consult    [address] [facility] [address]. Jocelyn Alcala at bedside. 1634 speculum exam, wet prep, ffn and SVE by Dr. Jocelyn Alcala    [id number] at bedside attempting 3rd monitoring of the day. Very active fetus A and fetus B, FHRs running similar but positioning of EFM would indicate 2 different babies. 2000 Bedside, Verbal and Written shift change report given to T. Lindy Soulier, RN (oncoming nurse) by BORIS Hart RN (offgoing nurse). Report included the following information SBAR, Kardex, Intake/Output, MAR, Accordion, Recent Results, Med Rec Status and Alarm Parameters .

## 2018-03-08 VITALS
DIASTOLIC BLOOD PRESSURE: 64 MMHG | RESPIRATION RATE: 16 BRPM | HEART RATE: 99 BPM | HEIGHT: 67 IN | OXYGEN SATURATION: 98 % | BODY MASS INDEX: 28.88 KG/M2 | TEMPERATURE: 98.2 F | SYSTOLIC BLOOD PRESSURE: 117 MMHG | WEIGHT: 184 LBS

## 2018-03-08 PROCEDURE — 97110 THERAPEUTIC EXERCISES: CPT

## 2018-03-08 PROCEDURE — 74011250637 HC RX REV CODE- 250/637: Performed by: OBSTETRICS & GYNECOLOGY

## 2018-03-08 PROCEDURE — 97161 PT EVAL LOW COMPLEX 20 MIN: CPT

## 2018-03-08 PROCEDURE — 76819 FETAL BIOPHYS PROFIL W/O NST: CPT | Performed by: OBSTETRICS & GYNECOLOGY

## 2018-03-08 PROCEDURE — 76820 UMBILICAL ARTERY ECHO: CPT | Performed by: OBSTETRICS & GYNECOLOGY

## 2018-03-08 RX ADMIN — Medication 325 MG: at 08:45

## 2018-03-08 RX ADMIN — RANITIDINE 75 MG: 75 TABLET, COATED ORAL at 08:45

## 2018-03-08 RX ADMIN — CALCIUM CARBONATE (ANTACID) CHEW TAB 500 MG 200 MG: 500 CHEW TAB at 08:45

## 2018-03-08 RX ADMIN — DOCUSATE SODIUM 100 MG: 100 CAPSULE, LIQUID FILLED ORAL at 08:45

## 2018-03-08 RX ADMIN — CALCIUM CARBONATE (ANTACID) CHEW TAB 500 MG 200 MG: 500 CHEW TAB at 12:47

## 2018-03-08 NOTE — PROGRESS NOTES
Physical Therapy Evaluation  Orders received for back and hip pain. Patient received in bed and reports having low back pain and anterior and lateral hip pain at times. She is up AD james and independent with mobility with plans for discharge this pm.  Patient was instructed on pelvic tilts in supine as well as standing, gentle lateral side to side hip/trunk rotation with noted slight pulling on the left. Also instructed on standing gentle hip extension. Educated on positioning in bed with pillows. Instructed to do as tolerated and very slow and gentle, to stop any exercise with increased pain, discomfort. Patient demonstrated good understanding of the above. No other needs at this time.   Rosana Manuel, PT

## 2018-03-08 NOTE — PROGRESS NOTES
Ante Partum Progress Note    Bindu Charlton  30w5d    Assessment: 30w5d     Problem # 1:  Twins monochorionic/diamniotic; admitted for increased monitoring due to concern for the possibility of developing twin-to-twin transfusion  Monitoring this week is not consistent with that diagnosis  Ultrasound today with Dr Miguelito Larson is noted and findings are reassuring        S/p neuroprotective magnesium  S/p 2 doses BMZ    She desires discharge home-discussed with Dr Emilio Toribio follow with him on Monday           Problem # 2: [facility] desires repeat: sched 18 (YJY-310.28) (DYS26-W98.211)      Problem # 3:  HSV-Valtrex @ 39 wks____ (XDX-849.71) (HOP52-O55.272)  no complaints       Problem # 4:  Anemia on FE pregnant (FYV-966.44) (JLC30-A36.013)  Continue Fe.            Plan:  Discharge home with the following: Activity: bedrest Diet: as tolerated. Follow up in office:3/12 Medications: prenatal vitamins and zantac. Orders/Charges: Medium and Non Stress Test  X 2    Patient states she has no new complaints    Vitals:  Visit Vitals    /64 (BP 1 Location: Left arm, BP Patient Position: At rest)    Pulse 99    Temp 98.2 °F (36.8 °C)    Resp 16    Ht 5' 7\" (1.702 m)    Wt 83.5 kg (184 lb)    LMP 2017 (Approximate)    SpO2 98%    Breastfeeding No    BMI 28.82 kg/m2     Temp (24hrs), Av.1 °F (36.7 °C), Min:98 °F (36.7 °C), Max:98.2 °F (36.8 °C)      Last 24hr Input/Output:  No intake or output data in the 24 hours ending 18 1425     Non stress test:  Reactive  Twin A 140 with moderate variability and accelerations;no declerations  Twin B 145 with moderate variability and accelerations;no decelerations  Fetal activity x2 noted    No data found. No data found. Uterine Activity: None     Exam:  Patient without distress.      Abdomen, fundus soft non-tender     Extremities, no redness or tenderness               Additional Exam: Deferred    Labs:     Lab Results   Component Value Date/Time    WBC 9.3 02/27/2018 07:29 PM    WBC 14.5 (H) 03/26/2015 03:00 AM    WBC 10.4 03/24/2015 09:50 AM    WBC 8.5 08/10/2012 03:30 PM    WBC 10.8 07/20/2012 04:25 PM    HGB 8.5 (L) 02/27/2018 07:29 PM    HGB 10.6 (L) 03/26/2015 03:00 AM    HGB 12.3 03/24/2015 09:50 AM    HGB 11.7 08/10/2012 03:30 PM    HGB 10.6 (L) 07/20/2012 04:25 PM    HCT 27.2 (L) 02/27/2018 07:29 PM    HCT 32.9 (L) 03/26/2015 03:00 AM    HCT 37.7 03/24/2015 09:50 AM    HCT 36.1 08/10/2012 03:30 PM    HCT 32.1 (L) 07/20/2012 04:25 PM    PLATELET 168 59/11/9546 07:29 PM    PLATELET 382 33/19/5367 03:00 AM    PLATELET 748 76/99/2322 09:50 AM    PLATELET 128 55/34/3200 03:30 PM    PLATELET 208 68/44/8906 04:25 PM       Recent Results (from the past 24 hour(s))   FETAL FIBRONECTIN    Collection Time: 03/07/18  4:40 PM   Result Value Ref Range    Fetal fibronectin NEGATIVE  NEG     WET PREP    Collection Time: 03/07/18  4:40 PM   Result Value Ref Range    Clue cells CLUE CELLS ABSENT      Wet prep NO TRICHOMONAS SEEN     KOH, OTHER SOURCES    Collection Time: 03/07/18  4:40 PM   Result Value Ref Range    Special Requests: NO SPECIAL REQUESTS      KOH NO YEAST SEEN     URINALYSIS W/ REFLEX CULTURE    Collection Time: 03/07/18  6:18 PM   Result Value Ref Range    Color YELLOW/STRAW      Appearance CLEAR CLEAR      Specific gravity 1.025 1.003 - 1.030      pH (UA) 6.5 5.0 - 8.0      Protein NEGATIVE  NEG mg/dL    Glucose NEGATIVE  NEG mg/dL    Ketone NEGATIVE  NEG mg/dL    Bilirubin NEGATIVE  NEG      Blood NEGATIVE  NEG      Urobilinogen 0.2 0.2 - 1.0 EU/dL    Nitrites NEGATIVE  NEG      Leukocyte Esterase NEGATIVE  NEG      WBC 0-4 0 - 4 /hpf    RBC 0-5 0 - 5 /hpf    Epithelial cells FEW FEW /lpf    Bacteria NEGATIVE  NEG /hpf    UA:UC IF INDICATED CULTURE NOT INDICATED BY UA RESULT CNI      Hyaline cast 2-5 0 - 5 /lpf

## 2018-03-08 NOTE — DISCHARGE SUMMARY
Antepartum  Discharge Summary     Patient ID:  Judge Riggs  [id number]  54 y.o.  1995    Admit date: 2/27/2018    Discharge date: 3/8/2018    Admission Diagnoses:    Patient Active Problem List   Diagnosis Code    IUGR (intrauterine growth restriction) [id number]    Rh negative status during pregnancy, antepartum O09.899    Retained placenta O73.0    Pregnancy Z34.90    Major depression F32.9    Adjustment disorder with depressed mood F43.21       Discharge Diagnoses: There are no discharge diagnoses documented for the most recent discharge. Patient Active Problem List   Diagnosis Code    IUGR (intrauterine growth restriction) [id number]    Rh negative status during pregnancy, antepartum O09.899    Retained placenta O73.0    Pregnancy Z34.90    Major depression F32.9    Adjustment disorder with depressed mood F43.21       Procedures for this admission:     Hospital Course:    Disposition: home    Discharged Condition: good    Patient plans to return for changes in her condition or the condition of the baby or for delivery of the baby. Patient Instructions:   Current Discharge Medication List      CONTINUE these medications which have NOT CHANGED    Details   raNITIdine (ZANTAC 75) 75 mg tablet Take 75 mg by mouth two (2) times a day. ferrous sulfate (SLOW FE) 142 mg (45 mg iron) ER tablet Take  by mouth two (2) times a day. escitalopram oxalate (LEXAPRO) 5 mg tablet Take 5 mg by mouth daily. Indications: ANXIETY WITH DEPRESSION           Activity: Bedrest  Diet: Regular Diet    Follow-up with   Follow-up Appointments   Procedures    FOLLOW UP VISIT Appointment in: Other ([facility]) Monday 3/12 with Dr Emi Arias     Monday 3/12 with Dr Emi Arias     Standing Status:   Standing     Number of Occurrences:   1     Order Specific Question:   Appointment in     Answer:    Other (Specify)        Signed:  Marleni Ambrocio MD  3/8/2018  2:33 PM

## 2018-03-08 NOTE — PROGRESS NOTES
0745: Bedside and Verbal shift change report given to SHEA Garcia (oncoming nurse) by Aida Miranda (offgoing nurse). Report included the following information SBAR.

## 2018-03-08 NOTE — PROGRESS NOTES
1230 Pt returned from [facility] Radha Joyce) via wc. Reports there are plans for discharge. Lenin Hinkle RN assumed care of pt after SBAR from SHEA Camarillo RN. [address] in to see pt, discussed discharge. Will follow up Monday 3/12 with David, and to call for appointment with [facility]. 1500 Discharge instructions reviewed with pt for PTL precautions. To follow up with Horacio Roche and [facility] as documented. 0 Pt left the unit, undelivered, via wc with belongings. Accompanied by Lenin Hinkle RN & spouse.

## 2018-03-08 NOTE — DISCHARGE INSTRUCTIONS
DISCHARGE INSTRUCTIONS    Name: Christopher Coburn  YOB: 1995  Primary Diagnosis: Active Problems:    Pregnancy (3/25/2015)        Introduction: You have visited the hospital because you thought you were in  labor. These guidelines are for your information at home to help prevent repeated problems. In general, you should remember:   Empty your bladder every 2-3 hours.  Avoid breast stimulation (including showers where the water stream is on your breasts)-this can cause contractions.  Rest means lying down.  Contractions and cramping happen more often in evening and nighttime.  No intercourse or sexual stimulation without asking your doctor.  Try to arrange for help with housework and . General:     Follow up with Grady Cox 3/12 and to call for appointment with [facility]    Diet/Diet Restrictions:      Anything you like/tolerate, Follow your regularly prescribed diet, Drink 8-10 glasses of water each day. Avoid beverages with caffeine. and Eat fresh vegetables, fruits, bran cereal to avoid becoming constipated. Physical Activity / Restrictions / Safety:     * Activity at home is based on how strong your  labor has been, You should follow the following activity guidelines. Modified bedrest:         Discharge Instructions/ Special Treatment/ Home Care Needs:     Call your provider if:   Uterine cramping (menstrual-like cramps, intermittent or constant   Uterine contractions every 10-15 minutes or more frequently   Low abdominal pressure ( pelvic pressure)   Dull low backache (intermittent or constant)   Increase or change in vaginal discharge   Feeling that the baby is \"pushing down\"   Abdominal cramping with or without diarrhea  If any of these symptoms are experienced, stop what you are doing, lie down on your side, drink two to three glasses of water and wait one hour. If the symptoms persist or get worse, call your provider.     Pain Management: Signed By: Gaby Manrique RN                                                                                                   Date: 3/8/2018 Time: 12:58 PM    Discharge Checklist-NURSING TO COMPLETE:     Date and Time of Discharge: Date: 3/8/2018 Time: 12:58 PM    Return of:   Dental Appliance: Dental Appliances: None  Vision: Visual Aid: None  Hearing Aid:    Jewelry: Jewelry: Ring  Clothing: Clothing: Shirt, Undergarments, Pants  Other Valuables: Other Valuables: None  Valuables sent to safe: Personal Items Sent to Safe: none    Prescription Given: no  Medication Instruction Sheet(s), including side effects, provided: no    Accompanied By: Family    Mode of Transportation:    Discharge Disposition: Home    I have had the opportunity to make my options or choices for discharge. I have received and understand these instructions. These are general instructions for a healthy lifestyle:    No smoking/ No tobacco products/ Avoid exposure to second hand smoke    Surgeon General's Warning:  Quitting smoking now greatly reduces serious risk to your health. Obesity, smoking, and sedentary lifestyle greatly increases your risk for illness    A healthy diet, regular physical exercise & weight monitoring are important for maintaining a healthy lifestyle    Recognize signs and symptoms of STROKE:    F-face looks uneven    A-arms unable to move or move unevenly    S-speech slurred or non-existent    T-time-call 911 as soon as signs and symptoms begin-DO NOT go       Back to bed or wait to see if you get better-TIME IS BRAIN.

## 2018-03-08 NOTE — PROGRESS NOTES
1940 Bedside SBAR report received from Anamaria Barnard 106    2025 Three painless contractions in last 10 minutes. Pt states bladder is full. Monitor off at this time. OOB BR to void    2045 Dr Nanci Correa reviewed tracing. No further monitoring needed tonight. Pt instructed to call if contractions become regular or uncomfortable and will monitor as needed.     1945 Bedside SBAR report given to Emilio Montague RN

## 2018-03-08 NOTE — CONSULTS
Maternal-Fetal Medicine    Indication: Twins Mono/Di 3rd Tri O30.033, Polyhydramnios (twin B) 3rd Tri 18. 3XX0, Abnormal ultrasound   testing O28.3.   ____________________________________________________________________________  History: Age: 23 years. : 3 Para: 2.  ____________________________________________________________________________  Dating:  Stated EDC:  EDC: 18 GA by stated EDC: 30w5d  Best Overall Assessment: 18 EDC: 18 Assessed GA: 30w5d  The Best Overall Assessment is based on the stated EDC.  ____________________________________________________________________________  Anatomy Scan:  Twin gestation. Fetus 1:   Fetal heart activity: present. Fetal heart rate: 155 bpm.   Fetal presentation: BREECH, Maternal right side. Cord: 2 vessels. Placenta: anterior. Fetal Anatomy:  Visualized with normal appearance: chest, gastrointestinal tract, kidneys, bladder. Heart: Limited views. Fetus 2:   Fetal heart activity: present. Fetal heart rate: 138 bpm.   Fetal presentation: [address]. Cord: 3 vessels. Placenta: anterior. Fetal Anatomy:  Visualized with normal appearance: chest, gastrointestinal tract, kidneys, bladder. Heart: The 4-chamber view was obtained but outflow tracts could not be adequately visualized. Summary of Ultrasound Findings:  Transabdominal US. U/S machine: JayCut E8 Expert. U/S view: limited by fetal position. ____________________________________________________________________________  Fetal Wellbeing Assessment:  Fetus 1:  Amniotic fluid: normal. MVP: 5.3 cm. Biophysical Profile: Fetal body movements: normal (2), Fetal tone: normal (2), Fetal breathing movements: normal (2), Amniotic fluid volume: normal (2). Score 8 / 8. Fetus 2:  Amniotic fluid: polyhydramnios. MVP: 14.4 cm.    Biophysical Profile: Fetal body movements: normal (2), Fetal tone: normal (2), Fetal breathing movements: normal (2), Amniotic fluid volume: normal (2). Score 8 / 8.     ____________________________________________________________________________  Doppler:  Fetal Doppler:  Fetus 1:  Umbilical Artery: PS 28.4 cm/s    ED 7.82 cm/s    S/D ratio 3.69     RI 0.73     PI 1.26     TAMX 16.43 cm/s     Fetus 2:  Umbilical Artery: PS -06.8 cm/s    ED -26.24 cm/s    S/D ratio 2.22     RI 0.55     PI 0.80     TAMX -39.89 cm/s     U/S Machine: Trunkbow E8 Expert.  ____________________________________________________________________________  Report Summary:  Impression:   Ms. Francie Rausch,  at 34w 5d gestation with monochorionic-diamniotic twin pregnancy, was admitted on 18 with abnormal umbilical artery Doppler in Twin A and polyhydramnios in twin B. Twin A has single umbilical artery. Growth discordancy was 31% on ultrasound performed at the [facility]. last week. NSTs performed in L&D have been reassuring. On todays ultrasound,   Twin A: Breech, anterior placenta. Single umbilical artery. Amniotic fluid is normal and good fetal activity is seen.  testing is reassuring. Increased resistance is seen on umbilical artery Doppler (Average S/D ratio was normal). Twin B: Breech, anterior placenta. Polyhydramnios is seen.  testing is reassuring. Umbilical artery Doppler study is normal. Fetal stomach is seen. I reassured the patient of the findings. Patient does not have any other risk factors including hypertension or diabetes in pregnancy. Prenatal screening for fetal aneuploidies was reportedly normal.    Findings are not consistent with twin-to-twin transfusion syndrome (no oligohydramnios in twin B). I informed the patient that polyhydramnios can be associated with fetal anomalies (tracheo-esophageal fistula) that may be evident only after birth.     Given that  testing has been reassuring through her hospital stay, I recommend discharge with follow-up as an outpatient at the [facility]. for continuity of care. I also informed the patient that  testing has limitations in predicting fetal compromise and monochorionic twins have a higher risk of complications including fetal demise of one or both twins. Frequency of  testing may be decided on her follow-up visit on Monday. If Doppler studies show absent end-diastolic flow, I recommend twice-weekly monitoring. Timing of delivery should be decided on her clinical course. Patient would like to go home. I discussed with Dr. Rose Kirkland. Recommendations:    testing (BPP, Doppler, NST) on 18.

## 2018-03-12 ENCOUNTER — ANESTHESIA (OUTPATIENT)
Dept: LABOR AND DELIVERY | Age: 23
End: 2018-03-12
Payer: COMMERCIAL

## 2018-03-12 ENCOUNTER — HOSPITAL ENCOUNTER (INPATIENT)
Age: 23
LOS: 3 days | Discharge: HOME | End: 2018-03-15
Attending: OBSTETRICS & GYNECOLOGY | Admitting: OBSTETRICS & GYNECOLOGY
Payer: COMMERCIAL

## 2018-03-12 ENCOUNTER — ANESTHESIA EVENT (OUTPATIENT)
Dept: LABOR AND DELIVERY | Age: 23
End: 2018-03-12
Payer: COMMERCIAL

## 2018-03-12 ENCOUNTER — HOSPITAL ENCOUNTER (OUTPATIENT)
Dept: PERINATAL CARE | Age: 23
Discharge: HOME | End: 2018-03-12
Attending: OBSTETRICS & GYNECOLOGY
Payer: COMMERCIAL

## 2018-03-12 DIAGNOSIS — Z34.90 PREGNANCY, UNSPECIFIED GESTATIONAL AGE: Primary | ICD-10-CM

## 2018-03-12 PROBLEM — O43.023 TWIN TO TWIN TRANSFUSION IN THIRD TRIMESTER: Status: ACTIVE | Noted: 2018-03-12

## 2018-03-12 LAB
ERYTHROCYTE [DISTWIDTH] IN BLOOD BY AUTOMATED COUNT: 17.3 % (ref 11.5–14.5)
HCT VFR BLD AUTO: 32 % (ref 35–47)
HGB BLD-MCNC: 10 G/DL (ref 11.5–16)
MCH RBC QN AUTO: 22.8 PG (ref 26–34)
MCHC RBC AUTO-ENTMCNC: 31.3 G/DL (ref 30–36.5)
MCV RBC AUTO: 72.9 FL (ref 80–99)
NRBC # BLD: 0 K/UL (ref 0–0.01)
NRBC BLD-RTO: 0 PER 100 WBC
PLATELET # BLD AUTO: 227 K/UL (ref 150–400)
RBC # BLD AUTO: 4.39 M/UL (ref 3.8–5.2)
WBC # BLD AUTO: 11.9 K/UL (ref 3.6–11)

## 2018-03-12 PROCEDURE — 36415 COLL VENOUS BLD VENIPUNCTURE: CPT | Performed by: OBSTETRICS & GYNECOLOGY

## 2018-03-12 PROCEDURE — 88307 TISSUE EXAM BY PATHOLOGIST: CPT | Performed by: OBSTETRICS & GYNECOLOGY

## 2018-03-12 PROCEDURE — 77030011255 HC DSG AQUACEL AG BMS -A

## 2018-03-12 PROCEDURE — 86870 RBC ANTIBODY IDENTIFICATION: CPT | Performed by: OBSTETRICS & GYNECOLOGY

## 2018-03-12 PROCEDURE — 74011250636 HC RX REV CODE- 250/636

## 2018-03-12 PROCEDURE — 76819 FETAL BIOPHYS PROFIL W/O NST: CPT | Performed by: OBSTETRICS & GYNECOLOGY

## 2018-03-12 PROCEDURE — 76010000397 HC C SECN MULTIPL FIRST 1 HR: Performed by: OBSTETRICS & GYNECOLOGY

## 2018-03-12 PROCEDURE — 76060000078 HC EPIDURAL ANESTHESIA: Performed by: OBSTETRICS & GYNECOLOGY

## 2018-03-12 PROCEDURE — 77030008459 HC STPLR SKN COOP -B

## 2018-03-12 PROCEDURE — 74011000250 HC RX REV CODE- 250

## 2018-03-12 PROCEDURE — 85027 COMPLETE CBC AUTOMATED: CPT | Performed by: OBSTETRICS & GYNECOLOGY

## 2018-03-12 PROCEDURE — 75410000003 HC RECOV DEL/VAG/CSECN EA 0.5 HR: Performed by: OBSTETRICS & GYNECOLOGY

## 2018-03-12 PROCEDURE — 77030011640 HC PAD GRND REM COVD -A

## 2018-03-12 PROCEDURE — 77030018836 HC SOL IRR NACL ICUM -A

## 2018-03-12 PROCEDURE — 76820 UMBILICAL ARTERY ECHO: CPT | Performed by: OBSTETRICS & GYNECOLOGY

## 2018-03-12 PROCEDURE — 77030032490 HC SLV COMPR SCD KNE COVD -B

## 2018-03-12 PROCEDURE — 74011250636 HC RX REV CODE- 250/636: Performed by: OBSTETRICS & GYNECOLOGY

## 2018-03-12 PROCEDURE — 77030018846 HC SOL IRR STRL H20 ICUM -A

## 2018-03-12 PROCEDURE — 74011250637 HC RX REV CODE- 250/637: Performed by: OBSTETRICS & GYNECOLOGY

## 2018-03-12 PROCEDURE — 76816 OB US FOLLOW-UP PER FETUS: CPT | Performed by: OBSTETRICS & GYNECOLOGY

## 2018-03-12 PROCEDURE — 86900 BLOOD TYPING SEROLOGIC ABO: CPT | Performed by: OBSTETRICS & GYNECOLOGY

## 2018-03-12 PROCEDURE — 74011250636 HC RX REV CODE- 250/636: Performed by: ANESTHESIOLOGY

## 2018-03-12 PROCEDURE — 65410000002 HC RM PRIVATE OB

## 2018-03-12 RX ORDER — PHENYLEPHRINE 10 MG/250 ML(40 MCG/ML)IN 0.9 % SOD.CHLORIDE INTRAVENOUS
Status: DISPENSED
Start: 2018-03-12 | End: 2018-03-13

## 2018-03-12 RX ORDER — OXYTOCIN/RINGER'S LACTATE 20/1000 ML
PLASTIC BAG, INJECTION (ML) INTRAVENOUS
Status: COMPLETED
Start: 2018-03-12 | End: 2018-03-12

## 2018-03-12 RX ORDER — AMMONIA 15 % (W/V)
1 AMPUL (EA) INHALATION AS NEEDED
Status: DISCONTINUED | OUTPATIENT
Start: 2018-03-12 | End: 2018-03-15 | Stop reason: HOSPADM

## 2018-03-12 RX ORDER — OXYCODONE AND ACETAMINOPHEN 5; 325 MG/1; MG/1
2 TABLET ORAL
Status: DISCONTINUED | OUTPATIENT
Start: 2018-03-12 | End: 2018-03-15 | Stop reason: HOSPADM

## 2018-03-12 RX ORDER — ONDANSETRON 2 MG/ML
INJECTION INTRAMUSCULAR; INTRAVENOUS AS NEEDED
Status: DISCONTINUED | OUTPATIENT
Start: 2018-03-12 | End: 2018-03-12 | Stop reason: HOSPADM

## 2018-03-12 RX ORDER — OXYCODONE AND ACETAMINOPHEN 5; 325 MG/1; MG/1
1 TABLET ORAL
Status: DISCONTINUED | OUTPATIENT
Start: 2018-03-12 | End: 2018-03-15 | Stop reason: HOSPADM

## 2018-03-12 RX ORDER — DOCUSATE SODIUM 100 MG/1
100 CAPSULE, LIQUID FILLED ORAL
Status: DISCONTINUED | OUTPATIENT
Start: 2018-03-12 | End: 2018-03-15 | Stop reason: HOSPADM

## 2018-03-12 RX ORDER — NALBUPHINE HYDROCHLORIDE 10 MG/ML
2.5 INJECTION, SOLUTION INTRAMUSCULAR; INTRAVENOUS; SUBCUTANEOUS
Status: DISPENSED | OUTPATIENT
Start: 2018-03-12 | End: 2018-03-13

## 2018-03-12 RX ORDER — ONDANSETRON 2 MG/ML
4 INJECTION INTRAMUSCULAR; INTRAVENOUS
Status: ACTIVE | OUTPATIENT
Start: 2018-03-12 | End: 2018-03-13

## 2018-03-12 RX ORDER — MISOPROSTOL 100 UG/1
TABLET ORAL AS NEEDED
Status: DISCONTINUED | OUTPATIENT
Start: 2018-03-12 | End: 2018-03-12 | Stop reason: HOSPADM

## 2018-03-12 RX ORDER — HYDROCORTISONE 1 %
CREAM (GRAM) TOPICAL AS NEEDED
Status: DISCONTINUED | OUTPATIENT
Start: 2018-03-12 | End: 2018-03-15 | Stop reason: HOSPADM

## 2018-03-12 RX ORDER — SODIUM CHLORIDE 0.9 % (FLUSH) 0.9 %
5-10 SYRINGE (ML) INJECTION EVERY 8 HOURS
Status: DISCONTINUED | OUTPATIENT
Start: 2018-03-12 | End: 2018-03-15 | Stop reason: HOSPADM

## 2018-03-12 RX ORDER — KETOROLAC TROMETHAMINE 30 MG/ML
30 INJECTION, SOLUTION INTRAMUSCULAR; INTRAVENOUS
Status: DISPENSED | OUTPATIENT
Start: 2018-03-12 | End: 2018-03-13

## 2018-03-12 RX ORDER — EPHEDRINE SULFATE 50 MG/ML
INJECTION, SOLUTION INTRAVENOUS AS NEEDED
Status: DISCONTINUED | OUTPATIENT
Start: 2018-03-12 | End: 2018-03-12 | Stop reason: HOSPADM

## 2018-03-12 RX ORDER — MORPHINE SULFATE 10 MG/ML
10 INJECTION, SOLUTION INTRAMUSCULAR; INTRAVENOUS
Status: ACTIVE | OUTPATIENT
Start: 2018-03-12 | End: 2018-03-13

## 2018-03-12 RX ORDER — CEFAZOLIN SODIUM 2 G/50ML
2 SOLUTION INTRAVENOUS ONCE
Status: COMPLETED | OUTPATIENT
Start: 2018-03-12 | End: 2018-03-12

## 2018-03-12 RX ORDER — KETOROLAC TROMETHAMINE 30 MG/ML
INJECTION, SOLUTION INTRAMUSCULAR; INTRAVENOUS AS NEEDED
Status: DISCONTINUED | OUTPATIENT
Start: 2018-03-12 | End: 2018-03-12 | Stop reason: HOSPADM

## 2018-03-12 RX ORDER — SODIUM CHLORIDE, SODIUM LACTATE, POTASSIUM CHLORIDE, CALCIUM CHLORIDE 600; 310; 30; 20 MG/100ML; MG/100ML; MG/100ML; MG/100ML
125 INJECTION, SOLUTION INTRAVENOUS CONTINUOUS
Status: DISCONTINUED | OUTPATIENT
Start: 2018-03-12 | End: 2018-03-15 | Stop reason: HOSPADM

## 2018-03-12 RX ORDER — ZOLPIDEM TARTRATE 5 MG/1
5 TABLET ORAL
Status: DISCONTINUED | OUTPATIENT
Start: 2018-03-12 | End: 2018-03-15 | Stop reason: HOSPADM

## 2018-03-12 RX ORDER — MORPHINE SULFATE 2 MG/ML
6 INJECTION, SOLUTION INTRAMUSCULAR; INTRAVENOUS
Status: ACTIVE | OUTPATIENT
Start: 2018-03-12 | End: 2018-03-13

## 2018-03-12 RX ORDER — MISOPROSTOL 200 UG/1
TABLET ORAL
Status: DISPENSED
Start: 2018-03-12 | End: 2018-03-13

## 2018-03-12 RX ORDER — SIMETHICONE 80 MG
80 TABLET,CHEWABLE ORAL
Status: DISCONTINUED | OUTPATIENT
Start: 2018-03-12 | End: 2018-03-15 | Stop reason: HOSPADM

## 2018-03-12 RX ORDER — SODIUM CHLORIDE 0.9 % (FLUSH) 0.9 %
5-10 SYRINGE (ML) INJECTION AS NEEDED
Status: DISCONTINUED | OUTPATIENT
Start: 2018-03-12 | End: 2018-03-15 | Stop reason: HOSPADM

## 2018-03-12 RX ORDER — OXYTOCIN/RINGER'S LACTATE 20/1000 ML
125-1000 PLASTIC BAG, INJECTION (ML) INTRAVENOUS
Status: DISCONTINUED | OUTPATIENT
Start: 2018-03-12 | End: 2018-03-12 | Stop reason: HOSPADM

## 2018-03-12 RX ORDER — BUPIVACAINE HYDROCHLORIDE 7.5 MG/ML
INJECTION, SOLUTION EPIDURAL; RETROBULBAR AS NEEDED
Status: DISCONTINUED | OUTPATIENT
Start: 2018-03-12 | End: 2018-03-12 | Stop reason: HOSPADM

## 2018-03-12 RX ORDER — ONDANSETRON 2 MG/ML
4 INJECTION INTRAMUSCULAR; INTRAVENOUS
Status: DISCONTINUED | OUTPATIENT
Start: 2018-03-12 | End: 2018-03-15 | Stop reason: HOSPADM

## 2018-03-12 RX ORDER — NALBUPHINE HYDROCHLORIDE 10 MG/ML
2.5 INJECTION, SOLUTION INTRAMUSCULAR; INTRAVENOUS; SUBCUTANEOUS
Status: COMPLETED | OUTPATIENT
Start: 2018-03-12 | End: 2018-03-12

## 2018-03-12 RX ORDER — ACETAMINOPHEN 10 MG/ML
INJECTION, SOLUTION INTRAVENOUS AS NEEDED
Status: DISCONTINUED | OUTPATIENT
Start: 2018-03-12 | End: 2018-03-12 | Stop reason: HOSPADM

## 2018-03-12 RX ORDER — MORPHINE SULFATE 0.5 MG/ML
INJECTION, SOLUTION EPIDURAL; INTRATHECAL; INTRAVENOUS AS NEEDED
Status: DISCONTINUED | OUTPATIENT
Start: 2018-03-12 | End: 2018-03-12 | Stop reason: HOSPADM

## 2018-03-12 RX ORDER — DIPHENHYDRAMINE HCL 25 MG
25 CAPSULE ORAL
Status: DISCONTINUED | OUTPATIENT
Start: 2018-03-12 | End: 2018-03-15 | Stop reason: HOSPADM

## 2018-03-12 RX ORDER — IBUPROFEN 400 MG/1
800 TABLET ORAL
Status: DISCONTINUED | OUTPATIENT
Start: 2018-03-12 | End: 2018-03-13

## 2018-03-12 RX ORDER — OXYTOCIN/RINGER'S LACTATE 20/1000 ML
125-1000 PLASTIC BAG, INJECTION (ML) INTRAVENOUS AS NEEDED
Status: DISCONTINUED | OUTPATIENT
Start: 2018-03-12 | End: 2018-03-15 | Stop reason: HOSPADM

## 2018-03-12 RX ADMIN — KETOROLAC TROMETHAMINE 30 MG: 30 INJECTION, SOLUTION INTRAMUSCULAR; INTRAVENOUS at 16:39

## 2018-03-12 RX ADMIN — NALBUPHINE HYDROCHLORIDE 2.5 MG: 10 INJECTION, SOLUTION INTRAMUSCULAR; INTRAVENOUS; SUBCUTANEOUS at 17:18

## 2018-03-12 RX ADMIN — ONDANSETRON 4 MG: 2 INJECTION INTRAMUSCULAR; INTRAVENOUS at 15:53

## 2018-03-12 RX ADMIN — MORPHINE SULFATE 200 MCG: 0.5 INJECTION, SOLUTION EPIDURAL; INTRATHECAL; INTRAVENOUS at 15:48

## 2018-03-12 RX ADMIN — NALBUPHINE HYDROCHLORIDE 2.5 MG: 10 INJECTION, SOLUTION INTRAMUSCULAR; INTRAVENOUS; SUBCUTANEOUS at 18:00

## 2018-03-12 RX ADMIN — NALBUPHINE HYDROCHLORIDE 2.5 MG: 10 INJECTION, SOLUTION INTRAMUSCULAR; INTRAVENOUS; SUBCUTANEOUS at 18:31

## 2018-03-12 RX ADMIN — NALBUPHINE HYDROCHLORIDE 2.5 MG: 10 INJECTION, SOLUTION INTRAMUSCULAR; INTRAVENOUS; SUBCUTANEOUS at 23:19

## 2018-03-12 RX ADMIN — BUPIVACAINE HYDROCHLORIDE 12 MG: 7.5 INJECTION, SOLUTION EPIDURAL; RETROBULBAR at 15:48

## 2018-03-12 RX ADMIN — CEFAZOLIN SODIUM 2 G: 2 SOLUTION INTRAVENOUS at 15:42

## 2018-03-12 RX ADMIN — SODIUM CHLORIDE, SODIUM LACTATE, POTASSIUM CHLORIDE, AND CALCIUM CHLORIDE 1000 ML: 600; 310; 30; 20 INJECTION, SOLUTION INTRAVENOUS at 15:42

## 2018-03-12 RX ADMIN — ACETAMINOPHEN 1000 MG: 10 INJECTION, SOLUTION INTRAVENOUS at 16:26

## 2018-03-12 RX ADMIN — EPHEDRINE SULFATE 10 MG: 50 INJECTION, SOLUTION INTRAVENOUS at 15:49

## 2018-03-12 RX ADMIN — SODIUM CHLORIDE, SODIUM LACTATE, POTASSIUM CHLORIDE, AND CALCIUM CHLORIDE 125 ML/HR: 600; 310; 30; 20 INJECTION, SOLUTION INTRAVENOUS at 21:59

## 2018-03-12 RX ADMIN — KETOROLAC TROMETHAMINE 30 MG: 30 INJECTION, SOLUTION INTRAMUSCULAR at 22:01

## 2018-03-12 RX ADMIN — Medication: at 16:10

## 2018-03-12 NOTE — IP AVS SNAPSHOT
[address] [facility] [address] [place] [address] 
[phone] Patient: Willem Frye MRN: [id number] CTM: About your hospitalization You were admitted on:  2018 You last received care in the:  [address] [facility] You were discharged on:  March 15, 2018 Why you were hospitalized Your primary diagnosis was:  Not on File Your diagnoses also included:  Twin To Twin Transfusion In Third Trimester Follow-up Information Follow up With Details Comments Contact Info Nancy Dunn MD   [address] 
[phone] Dinora Girard MD Schedule an appointment as soon as possible for a visit in 6 weeks  [address] [place] [address] 
[phone] Your Scheduled Appointments 2018  SECTION with Dinora Girard MD  
MRM 3 LABOR & DELIVERY ([address]) [address] [facility]  
[phone] Discharge Orders None A check erika indicates which time of day the medication should be taken. My Medications START taking these medications Instructions Each Dose to Equal  
 Morning Noon Evening Bedtime  
 ibuprofen 600 mg tablet Commonly known as:  MOTRIN Your last dose was: Your next dose is: Take 1 Tab by mouth every six (6) hours as needed for Pain. 600 mg  
    
   
   
   
  
 oxyCODONE-acetaminophen 5-325 mg per tablet Commonly known as:  PERCOCET Your last dose was: Your next dose is: Take 1-2 Tabs by mouth every four (4) hours as needed for Pain. Max Daily Amount: 12 Tabs. 1-2 Tab CONTINUE taking these medications Instructions Each Dose to Equal  
 Morning Noon Evening Bedtime LEXAPRO 5 mg tablet Generic drug:  escitalopram oxalate Your last dose was: Your next dose is: Take 5 mg by mouth daily. Indications: ANXIETY WITH DEPRESSION  
 5 mg PRENATAL GUMMY PO Your last dose was: Your next dose is: Take 2 Tabs by mouth daily. 2 Tab  
    
   
   
   
  
 SLOW  mg (45 mg iron) ER tablet Generic drug:  ferrous sulfate Your last dose was: Your next dose is: Take  by mouth two (2) times a day. STOP taking these medications ZANTAC 75 75 mg tablet Generic drug:  raNITIdine Where to Get Your Medications Information on where to get these meds will be given to you by the nurse or doctor. ! Ask your nurse or doctor about these medications  
  ibuprofen 600 mg tablet  
 oxyCODONE-acetaminophen 5-325 mg per tablet Discharge Instructions POSTPARTUM DISCHARGE INSTRUCTIONS Name:  Chip Bridges YOB: 1995 Admission Diagnosis:  Twin to twin transfusion in third trimester IUP at 32. 2 weeks gest, Twins, Repeat Twin to Twin Transfusion Discharge Diagnosis:   
Problem List as of 3/15/2018  Date Reviewed: 10/8/2012 Codes Class Noted - Resolved Twin to twin transfusion in third trimester ICD-10-CM: O43.023 
ICD-9-CM: 678.03  3/12/2018 - Present Adjustment disorder with depressed mood ICD-10-CM: F43.21 ICD-9-CM: 309.0  9/16/2017 - Present Major depression ICD-10-CM: F32.9 ICD-9-CM: 296.20  9/15/2017 - Present Pregnancy ICD-10-CM: Z34.90 ICD-9-CM: V22.2  3/25/2015 - Present Retained placenta ICD-10-CM: O73.0 ICD-9-CM: 667.00  8/10/2012 - Present IUGR (intrauterine growth restriction) ICD-10-CM: JDG9538 ICD-9-CM: 764.90  7/20/2012 - Present Rh negative status during pregnancy, antepartum ICD-10-CM: O09.899 ICD-9-CM: V23.89  7/20/2012 - Present Attending Physician:  Poppy White MD 
 
 Delivery Type:   Section: What to Expect at [facility] Your Recovery: A  section, or , is surgery to deliver your baby through a cut, called an incision that the doctor makes in your lower belly and uterus. You may have some pain in your lower belly and need pain medicine for 1 to 2 weeks. You can expect some vaginal bleeding for several weeks. You will probably need about 6 weeks to fully recover. It is important to take it easy while the incision is healing. Avoid heavy lifting, strenuous activities, or exercises that strain the belly muscles while you are recovering. Ask a family member or friend for help with housework, cooking, and shopping. This care sheet gives you a general idea about how long it will take for you to recover. But each person recovers at a different pace. Follow the steps below to get better as quickly as possible. How can you care for yourself at home? Activity · Rest when you feel tired. Getting enough sleep will help you recover. · Try to walk each day. Start by walking a little more than you did the day before. Bit by bit, increase the amount you walk. Walking boosts blood flow and helps prevent pneumonia, constipation, and blood clots. · Avoid strenuous activities, such as bicycle riding, jogging, weightlifting, and aerobic exercise, for 6 weeks or until your doctor says it is okay. · Until your doctor says it is okay, do not lift anything heavier than your baby. · Do not do sit-ups or other exercise that strain the belly muscles for 6 weeks or until your doctor says it is okay. · Hold a pillow over your incision when you cough or take deep breaths. This will support your belly and decrease your pain. · You may shower as usual. Pat the incision dry when you are done. · You will have some vaginal bleeding. Wear sanitary pads. Do not douche or use tampons until your doctor says it is okay. · Ask your doctor when you can drive again. · You will probably need to take at least 6 weeks off work. It depends on the type of work you do and how you feel. · Wait until you are healed (about 4 to 6 weeks) before you have sexual intercourse. Your doctor will tell you when it is okay to have sex. · Talk to your doctor about birth control. You can get pregnant even before your period returns. Also, you can get pregnant while you are breast-feeding. Incision care Your skin is your body's first line of defense against germs, but an incision site leaves an easy way for germs to enter your body. To prevent infection: · Clean your hands frequently and before and after changing any touching any dressings. · If you have strips of tape on the incision, leave the tape on for a week or until it falls off. · Look at your incision closely every day for any changes. Contact your doctor if you experience any signs of infection, such as fever or increased redness at the surgical site. · Wash the area daily with warm, soapy water, and pat it dry. Don't use hydrogen peroxide or alcohol, which can slow healing. You may cover the area with a gauze bandage if it weeps or rubs against clothing. Change the bandage every day. · Keep the area clean and dry. Diet · You can eat your normal diet. If your stomach is upset, try bland, low-fat foods like plain rice, broiled chicken, toast, and yogurt. · Drink plenty of fluids (unless your doctor tells you not to). · You may notice that your bowel movements are not regular right after your surgery. This is common. Try to avoid constipation and straining with bowel movements. You may want to take a fiber supplement every day. If you have not had a bowel movement after a couple of days, ask your doctor about taking a mild laxative. · If you are breast-feeding, do not drink any alcohol. Medicines · Take pain medicines exactly as directed.  
· If the doctor gave you a prescription medicine for pain, take it as prescribed. · If you are not taking a prescription pain medicine, ask your doctor if you can take an over-the-counter medicine such as acetaminophen (Tylenol), ibuprofen (Advil, Motrin), or naproxen (Aleve), for cramps. Read and follow all instructions on the label. Do not take aspirin, because it can cause more bleeding. Do not take acetaminophen (Tylenol) and other acetaminophen containing medications (i.e. Percocet) at the same time. · If you think your pain medicine is making you sick to your stomach: 
· Take your medicine after meals (unless your doctor has told you not to). · Ask your doctor for a different pain medicine. · If your doctor prescribed antibiotics, take them as directed. Do not stop taking them just because you feel better. You need to take the full course of antibiotics. Mental Health · Many women get the \"baby blues\" during the first few days after childbirth. You may lose sleep, feel irritable, and cry easily. You may feel happy one minute and sad the next. Hormone changes are one cause of these emotional changes. Also, the demands of a new baby, along with visits from relatives or other family needs, add to a mother's stress. The \"baby blues\" often peak around the fourth day. Then they ease up in less than 2 weeks. · If your moodiness or anxiety lasts for more than 2 weeks, or if you feel like life is not worth living, you may have postpartum depression. This is different for each mother. Some mothers with serious depression may worry intensely about their infant's well-being. Others may feel distant from their child. Some mothers might even feel that they might harm their baby. A mother may have signs of paranoia, wondering if someone is watching her. · With all the changes in your life, you may not know if you are depressed. Pregnancy sometimes causes changes in how you feel that are similar to the symptoms of depression. · Symptoms of depression include: · Feeling sad or hopeless and losing interest in daily activities. These are the most common symptoms of depression. · Sleeping too much or not enough. · Feeling tired. You may feel as if you have no energy. · Eating too much or too little. · POSTPARTUM SUPPORT INTERNATIONAL (PSI) offers a Warm line; Chat with the Expert phone sessions; Information and Articles about Pregnancy and Postpartum Mood Disorders; Comprehensive List of Free Support Groups; Knowledgeable local coordinators who will offer support, information, and resources; Guide to Resources on [employer]; Calendar of events in the  mood disorders community; Latest News and Research; and [facility] [address] for [employer]. Remember - You are not alone; You are not to blame; With help, you will be well. 3-033-475-PPD(1831). WWW. POSTPARTUM. NET · Writing or talking about death, such as writing suicide notes or talking about guns, knives, or pills. Keep the numbers for these national suicide hotlines: 4-799-281-TALK (6-178.114.5390) and 3-796-PRKNXDX (2-652.810.8265). If you or someone you know talks about suicide or feeling hopeless, get help right away. Other instructions · If you breast-feed your baby, you may be more comfortable while you are healing if you place the baby so that he or she is not resting on your belly. Try tucking your baby under your arm, with his or her body along the side you will be feeding on. Support your baby's upper body with your arm. With that hand you can control your baby's head to bring his or her mouth to your breast. This is sometimes called the football hold. Follow-up care is a key part of your treatment and safety. Be sure to make and go to all appointments, and call your doctor if you are having problems. It's also a good idea to know your test results and keep a list of the medicines you take. When should you call for help? Call 911 anytime you think you may need emergency care. For example, call if: 
· You are thinking of hurting yourself, your baby, or anyone else. · You passed out (lost consciousness). · You have symptoms of a blood clot in your lung (called a pulmonary embolism). These may include: 
· Sudden chest pain. · Trouble breathing. · Coughing up blood. Call your doctor now or seek immediate medical care if: 
 
· You have severe vaginal bleeding. · You are soaking through a pad each hour for 2 or more hours. · Your vaginal bleeding seems to be getting heavier or is still bright red 4 days after delivery. · You are dizzy or lightheaded, or you feel like you may faint. · You are vomiting or cannot keep fluids down. · You have a fever. · You have new or more belly pain. · You have loose stitches, or your incision comes open. · You have symptoms of infection, such as: 
· Increased pain, swelling, warmth, or redness. · Red streaks leading from the incision. · Pus draining from the incision. · A fever · You pass tissue (not just blood). · Your vaginal discharge smells bad. · Your belly feels tender or full and hard. · Your breasts are continuously painful or red. · You feel sad, anxious, or hopeless for more than a few days. · You have sudden, severe pain in your belly. · You have symptoms of a blood clot in your leg (called a deep vein thrombosis), such as: 
· Pain in your calf, back of the knee, thigh, or groin. · Redness and swelling in your leg or groin. · You have symptoms of preeclampsia, such as: 
· Sudden swelling of your face, hands, or feet. · New vision problems (such as dimness or blurring). · A severe headache. · Your blood pressure is higher than it should be or rises suddenly. · You have new nausea or vomiting. Watch closely for changes in your health, and be sure to contact your doctor if you have any problems. Additional Information:  Not applicable These are general instructions for a healthy lifestyle: No smoking/ No tobacco products/ Avoid exposure to second hand smoke Surgeon General's Warning:  Quitting smoking now greatly reduces serious risk to your health. Obesity, smoking, and sedentary lifestyle greatly increases your risk for illness A healthy diet, regular physical exercise & weight monitoring are important for maintaining a healthy lifestyle Recognize signs and symptoms of STROKE: 
 
F-face looks uneven A-arms unable to move or move unevenly S-speech slurred or non-existent T-time-call 911 as soon as signs and symptoms begin - DO NOT go  
    back to bed or wait to see if you get better - TIME IS BRAIN. I have had the opportunity to make my options or choices for discharge. I have received and understand these instructions. [employer] Announcement We are excited to announce that we are making your provider's discharge notes available to you in [employer]. You will see these notes when they are completed and signed by the physician that discharged you from your recent hospital stay. If you have any questions or concerns about any information you see in Gamervision, please call the Health Information Department where you were seen or reach out to your Primary Care Provider for more information about your plan of care. Introducing [facility]! [address] introduces Gamervision patient portal. Now you can access parts of your medical record, email your doctor's office, and request medication refills online. 1. In your internet browser, go to [email or web address] 2. Click on the First Time User? Click Here link in the Sign In box. You will see the New Member Sign Up page. 3. Enter your Gam[employer] Access Code exactly as it appears below. You will not need to use this code after youve completed the sign-up process.  If you do not sign up before the expiration date, you must request a new code. 
 
· [employer] Access Code: [email or web address] Expires: 6/6/2018  3:56 PM 
 
4. Enter the last four digits of your Social Security Number (xxxx) and Date of Birth (mm/dd/yyyy) as indicated and click Submit. You will be taken to the next sign-up page. 5. Create a [employer] ID. This will be your [employer] login ID and cannot be changed, so think of one that is secure and easy to remember. 6. Create a [employer] password. You can change your password at any time. 7. Enter your Password Reset Question and Answer. This can be used at a later time if you forget your password. 8. Enter your e-mail address. You will receive e-mail notification when new information is available in [address] 9. Click Sign Up. You can now view and download portions of your medical record. 10. Click the Download Summary menu link to download a portable copy of your medical information. If you have questions, please visit the Frequently Asked Questions section of the [employer] website. Remember, [employer] is NOT to be used for urgent needs. For medical emergencies, dial 911. Now available from your iPhone and Android! Providers Seen During Your Hospitalization Provider Specialty Primary office phone Michael MeliaDunia 7 Gynecology [phone] Immunizations Administered for This Admission Name Date Rho(D) Immune Globulin - IM 3/14/2018 Your Primary Care Physician (PCP) Primary Care Physician Office Phone Office Fax Primitivo Espinoza [address] You are allergic to the following No active allergies Recent Documentation Height Weight Breastfeeding? BMI OB Status Smoking Status 1.702 m 83.5 kg Unknown 28.82 kg/m2 Recent pregnancy Former Smoker Emergency Contacts Name Discharge Info Relation Home Work Mobile [facility] N/A  AT THIS TIME [6] Mother [14] [phone] Patient Belongings The following personal items are in your possession at time of discharge: 
  Dental Appliances: None  Visual Aid: None      Home Medications: None   Jewelry: None  Clothing: At bedside, With patient    Other Valuables: None Please provide this summary of care documentation to your next provider. Signatures-by signing, you are acknowledging that this After Visit Summary has been reviewed with you and you have received a copy. Patient Signature:  ____________________________________________________________ Date:  ____________________________________________________________  
  
[facility] Payor Provider Signature:  ____________________________________________________________ Date:  ____________________________________________________________

## 2018-03-12 NOTE — ANESTHESIA POSTPROCEDURE EVALUATION
Post-Anesthesia Evaluation and Assessment    Patient: Chip Bridges MRN: [id number]  SSN: [id number]    YOB: 1995  Age: 23 y.o. Sex: female       Cardiovascular Function/Vital Signs  Visit Vitals    /59    Pulse 94    Temp 36.5 °C (97.7 °F)    Resp 16    Ht 5' 7\" (1.702 m)    Wt 83.5 kg (184 lb)    SpO2 100%    Breastfeeding Yes    BMI 28.82 kg/m2       Patient is status post spinal anesthesia for Procedure(s):   SECTION MULTIPLE. Nausea/Vomiting: None    Postoperative hydration reviewed and adequate. Pain:  Pain Scale 1: Numeric (0 - 10) (18)  Pain Intensity 1: 0 (18)   Managed    Neurological Status:   Neuro (WDL): Within Defined Limits (18)   At baseline    Mental Status and Level of Consciousness: Arousable    Pulmonary Status:   O2 Device: Room air (18)   Adequate oxygenation and airway patent    Complications related to anesthesia: None    Post-anesthesia assessment completed.  No concerns    Signed By: Corey Arshad MD     2018

## 2018-03-12 NOTE — PROGRESS NOTES
3/12/2018  1:42 PM Pt received for repeat  section at 31 2/7 weeks with HO assymetric growth and fluid with twins. Bedside shift change report given to Britney (oncoming nurse) by Nate Gregory RN (offgoing nurse). Report included the following information SBAR, Kardex, Procedure Summary, Intake/Output, MAR, Accordion, Recent Results and Med Rec Status.

## 2018-03-12 NOTE — PROGRESS NOTES
M - Please see the Ultrasound report / consult note to be entered into this patient's record as a scanned document from my office. A text copy of this note is also provided below for convenience. Monik Dickson M.D., Ph.D.  Kalina Caro    -----------------------------------------------------    Indication: Twins Mono/Di 3rd Tri O30.033, Polyhydramnios 3rd Tri 040. 3XX0.   ____________________________________________________________________________  History: Age: 23 years. : 3 Para: 2.  ____________________________________________________________________________  Dating:  Stated EDC:  EDC: 18 GA by stated EDC: 31w2d  Best Overall Assessment: 18 EDC: 18 Assessed GA: 31w2d  The Best Overall Assessment is based on the stated EDC.  ____________________________________________________________________________  General Evaluation:  Fetus 1:  Fetal heart activity: present. Fetal heart rate: 132 bpm.   Presentation: BREECH. Fetal movement: visible. Amniotic Fluid: reduced. Maximal vertical pocket 2.5 cm. Cord: 2 vessels. Placenta: anterior. Fetus 2:  Fetal heart activity: present. Fetal heart rate: 150 bpm.   Presentation: BREECH. Fetal movement: visible. Amniotic Fluid: polyhydramnios. KATHY  17.9 cm. Maximal vertical pocket 17.9 cm. Cord: 3 vessels. Placenta: anterior. ____________________________________________________________________________  Anatomy Scan:  Twin gestation. Fetus 1:     Fetal Anatomy:  Visualized with normal appearance: head, chest, gastrointestinal tract. Heart: Limited views. Fetus 2:     Fetal Anatomy:  Visualized with normal appearance: chest, kidneys, bladder. Heart: Limited views. Gastrointestinal Tract: Visualized with normal appearance: liver. Umbilical vein varix. Summary of Ultrasound Findings:  Transabdominal US. U/S machine: [id number] E8 Expert. U/S view: poor.    Impression: The evaluated fetal anatomy appears normal, but lower amniotic fluid limits fetal evaluation. ____________________________________________________________________________  Fetal Wellbeing Assessment:  Fetus 1:  Amniotic fluid: reduced. MVP: 2.5 cm. Biophysical Profile: Fetal body movements: normal (2), Fetal tone: Not seen, Fetal breathing movements: normal (2), Amniotic fluid volume: normal (2). Score 6 / 8. Fetus 2:  Amniotic fluid: polyhydramnios. KATHY: 17.9 cm. MVP: 17.9 cm. Biophysical Profile: Fetal body movements: normal (2), Fetal tone: normal (2), Fetal breathing movements: normal (2), Amniotic fluid volume: normal (2). Score 8 / 8. Summary: Twin A - fetal status is equivocal; Twin B - fetal status is reassuring.    ____________________________________________________________________________  Doppler:  Fetal Doppler:  Fetus 1:  Umbilical Artery: EDF reverse flow  Impression: ABNORMAL:  Persistently reversed end-diastolic flow with infrequent patterns of forward end-diastolic flow and increased flow resistance. Fetus 2:  Umbilical Artery: PS 58.3 cm/s    ED 35.14 cm/s    EDF present   S/D ratio 2.06     RI 0.51     PI 0.71     TAMX 52.39 cm/s   Impression: Normal umbilical artery Doppler blood flow indices and waveforms. U/S Machine: [id number] E8 Expert.  ____________________________________________________________________________  Report Summary:  Impression: A follow-up evaluation of twins was performed for fetal evaluation. Biophysical profile and umbilical artery Doppler studies are performed for twins to evaluate fetal well-being.        Ms. Raúl Lee has a monochorionic diamnionic pregnancy with the following findings:      (a) Fetal growth discrepancy (approximately 25-30% discordance),     (b) Twin A -- Normal fetal growth (~40%ile), subjectively reduced amniotic fluid, single umbilical artery, and intermittently abnormal umbilical artery Doppler velocity profiles, and    (c) Twin B - Large-for-gestational age (>90-95%ile), polyhydramnios, and intrahepatic umbilical vein varix. She was hospitalized at 29 weeks and received  steroids and neuroprotective magnesium sulfate. She has received consultation from the neonatology service. She has no major medical problems. She reports good fetal movement and increasing discomfort from abdominal distention. She reports having more frequent contractions. She denies bleeding, and loss of fluid. A viable twin IUP is observed. Twin assignment is based on fetal size and amniotic fluid appearance. Placental morphology and membrane appearance is consistent with monochorionic-diamnionic placentation. Twin A:  BREECH, maternal right, posterior placentation. Small size, low amniotic fluid. Twin B:  BREECH (unstable lie), posterior placentation. Larger size, polyhydramnios. The evaluated fetal anatomy appears as indicated above. Twin A has a single umbilical vein. Twin B has an umbilical vein varix in the liver. Fetal movements are observed for each twin, but fetal tone is NOT observed for Twin A. There is no evidence of twin-twin transfusion syndrome at this time. Amniotic fluid is subjectively reduced for Twin A, although MVP measures 2.5 cm. Amniotic fluid measurements are consistent with moderate-to-severe polyhydramnios for Twin B (MVP is 18 cm), which is increased from prior weeks. Umbilical artery flow evaluation for Twin A (single umbilical artery) shows persistently reversed end-diastolic flow, which represents a change compared to her prior evaluations. Umbilical artery Doppler for Twin B is normal.    Biophysical profile:  Twin A - 6 out of 8 points; -2 for tone; Twin B - 8 out of 8 points    Fetal status is concerning for Twin A based on decreasing amniotic fluid, persistently reversed end-diastolic umbilical artery flow, and BPP score of 6 out of 8; fetal status is reassuring for Twin B.      Recommendations: Recommend non-urgent delivery at this time due to concerning status of Twin A.  steroids were provided at 29 weeks.  delivery is appropriate based on fetal breech presentations. Dr. Daniele Frank is aware of today's findings. Ms. Kory Larsen will proceed to L&D for further evaluation and preparation for delivery. ____________________________________________________________________________  Fetal Growth Overview:  Fetus 1  Date GA BPD [mm] HC [mm] AC [mm] FL [mm] HUM [mm] EFW   18 29 + 5 74.8 47th 270.0 12th 253.3 38th 55.5 23rd 47.5 11th 1396g , 3 lbs 1 ozs 47th%%  18 30 + 2 ... ... ... ... ... ... . ..  18 30 + 5 ... ... ... ... ... ... . ..  18 31 + 2 ... ... ... ... ... ... ... Fetus 2  Date GA BPD [mm] HC [mm] AC [mm] FL [mm] HUM [mm] EFW   18 29 + 5 77.2 77th 286.0 66th 293.5 >95th 62.4 94th 53.2 79th 2014g , 4 lbs 7 ozs 88th%%  18 30 + 2 ... ... ... ... ... ... . ..  18 30 + 5 ... ... ... ... ... ... . ..  18 31 + 2 ... ... ... ... ... ... . ..

## 2018-03-12 NOTE — IP AVS SNAPSHOT
[address] 
[phone] Patient: Tressa Spear MRN: [id number] GYU:[birth year] A check erika indicates which time of day the medication should be taken. My Medications START taking these medications Instructions Each Dose to Equal  
 Morning Noon Evening Bedtime  
 ibuprofen 600 mg tablet Commonly known as:  MOTRIN Your last dose was: Your next dose is: Take 1 Tab by mouth every six (6) hours as needed for Pain. 600 mg  
    
   
   
   
  
 oxyCODONE-acetaminophen 5-325 mg per tablet Commonly known as:  PERCOCET Your last dose was: Your next dose is: Take 1-2 Tabs by mouth every four (4) hours as needed for Pain. Max Daily Amount: 12 Tabs. 1-2 Tab CONTINUE taking these medications Instructions Each Dose to Equal  
 Morning Noon Evening Bedtime LEXAPRO 5 mg tablet Generic drug:  escitalopram oxalate Your last dose was: Your next dose is: Take 5 mg by mouth daily. Indications: ANXIETY WITH DEPRESSION  
 5 mg PRENATAL GUMMY PO Your last dose was: Your next dose is: Take 2 Tabs by mouth daily. 2 Tab  
    
   
   
   
  
 SLOW  mg (45 mg iron) ER tablet Generic drug:  ferrous sulfate Your last dose was: Your next dose is: Take  by mouth two (2) times a day. STOP taking these medications ZANTAC 75 75 mg tablet Generic drug:  raNITIdine Where to Get Your Medications Information on where to get these meds will be given to you by the nurse or doctor. ! Ask your nurse or doctor about these medications  
  ibuprofen 600 mg tablet  
 oxyCODONE-acetaminophen 5-325 mg per tablet

## 2018-03-12 NOTE — L&D DELIVERY NOTE
Delivery Summary  Patient: Kylie Miranda             Circumcision:   NA-female  Additional Delivery Comments - 31.2w mono/di twins, delivered for persistent absent end diastolic flow of twin A      Information for the patient's :  Jose [place] Molina, GIRl Macey Nicole [[id number]]       Labor Events:    Labor: No   Rupture Date:     Rupture Time:     Rupture Type Intact   Amniotic Fluid Volume:      Amniotic Fluid Description:       Induction:         Augmentation:     Labor Events:       Cervical Ripening:           Delivery Events:  Episiotomy: None   Laceration(s): None     Repaired: None    Number of Repair Packets:     Suture Type and Size:       Estimated Blood Loss (ml):  ml       Delivery Date: 3/6/2018    Delivery Time:   Delivery Type:    Sex:  Female     Gestational Age: 32w2d   Delivery Clinician:     Living Status:     Delivery Location:              [facility]  One minute Five minutes Ten minutes   Skin color:            Heart rate:            Grimace:            Muscle tone:            Breathing: Totals:                Presentation:      Position:        Resuscitation Method:        Meconium Stained:        Cord Vessels:        Cord Events:    Cord Blood Sent?:       Blood Gases Sent?:       Placenta:  Date/Time:    Removal:        Appearance:       [place] Measurements:  Birth Weight:        Birth Length:        Head Circumference:        Chest Circumference:       Abdominal Girth:       Other Providers:    , Obstetrician;Primary Nurse;Primary  Nurse;Nicu Nurse;Neonatologist;Anesthesiologist;Crna;Nurse Practitioner;Midwife;Nursery Nurse       Information for the patient's :  Rodolfo Gomez [[id number]]       Labor Events:    Labor: No   Rupture Date: 3/12/2018   Rupture Time: 4:07 PM   Rupture Type AROM   Amniotic Fluid Volume: Polyhydramic    Amniotic Fluid Description: Clear None   Induction:         Augmentation:     Labor Events:       Cervical Ripening: Delivery Events:  Episiotomy: None   Laceration(s): None     Repaired: None    Number of Repair Packets:     Suture Type and Size: None     Estimated Blood Loss (ml):  ml       Delivery Date: 3/12/2018    Delivery Time: 4:08 PM  Delivery Type:    Sex:  Female     Gestational Age: 32w1d   Delivery Clinician:  Chandni Mcneill  Living Status: Living   Delivery Location: L&D OR 2          [facility]  One minute Five minutes Ten minutes   Skin color: 0   1        Heart rate: 2   2        Grimace: 1   2        Muscle tone: 1   2        Breathin   2        Totals: 5   9            Presentation: Breech    Position:        Resuscitation Method:        Meconium Stained:        Cord Vessels:        Cord Events:    Cord Blood Sent?:       Blood Gases Sent?:       Placenta:  Date/Time: 3/12  4:10 PM  Removal: Manual Removal      Appearance: Normal      Measurements:  Birth Weight:        Birth Length:        Head Circumference:        Chest Circumference:       Abdominal Girth: Other Providers:   DWAYNE POWERS;BENJI SHARP;YAZMIN JARAMILLO W;TERESA DE LA ROSA P;CLAUDIA CONNELLY;;;;;;INOCENCIO PATE;HENRIETTA MCKNIGHT, Obstetrician;Primary Nurse;Primary  Nurse;Nicu Nurse;Neonatologist;Anesthesiologist;Crna;Nurse Practitioner;Midwife;Nursery Nurse;Respiratory Therapist;Nicu Nurse       Information for the patient's :  Joyceann Filter 2 Everardo Staley [[id number]]       Labor Events:    Labor: No   Rupture Date: 3/12/2018   Rupture Time: 4:08 PM   Rupture Type AROM   Amniotic Fluid Volume:  Moderate    Amniotic Fluid Description: Clear None   Induction: None       Augmentation: None   Labor Events: None     Cervical Ripening:           Delivery Events:  Episiotomy: None   Laceration(s): None     Repaired: None    Number of Repair Packets:     Suture Type and Size:       Estimated Blood Loss (ml):  ml       Delivery Date: 3/12/2018    Delivery Time: 4:08 PM  Delivery Type: , Low Transverse  Sex: Female     Gestational Age: 32w1d   Delivery Clinician:  Vern Michelle  Living Status: Living   Delivery Location: L&D OR 2          [facility]  One minute Five minutes Ten minutes   Skin color: 0   1        Heart rate: 2   2        Grimace: 1   2        Muscle tone: 1   2        Breathin   2        Totals: 5   9            Presentation: Vertex    Position:        Resuscitation Method:  PPV     Meconium Stained: None      Cord Vessels:        Cord Events:    Cord Blood Sent?:  Yes    Blood Gases Sent?:  No    Placenta:  Date/Time: 3/12  4:10 PM  Removal: Expressed      Appearance: Normal      Measurements:  Birth Weight:        Birth Length:        Head Circumference:        Chest Circumference:       Abdominal Girth:       Other Providers:   Destiny YANG;BENJI SHARP;YAZMIN JARAMILLO;TERESA DE LA ROSA;Barbara CONNELLY;;;;, Obstetrician;Primary Nurse;Primary [place] Nurse;Nicu Nurse;Neonatologist;Anesthesiologist;Crna;Nurse Practitioner;Midwife;Nursery Nurse           Cord pH:  none    Episiotomy:      Augustatte Landau, Candido Rosenberg Amour [[id number]]   None     Anice Humphrey 1, philip [[id number]]   None     [place], GIRL 2 philip [[id number]]   None    Laceration(s):      Augustatte Landau, GIRl Shakira Amour [[id number]]   None     [place], GIRL 1, philip [[id number]]   None     [place], GIRL 2 philip [[id number]]   None      Estimated Blood Loss (ml): 800    Labor Events  Method:      Jose Solan GIRl Shakira Amour [[id number]]         Anice Humphrey 1, philip [[id number]]         Anice Humphrey 2 philip [[id number]]   None       AugmentationAnselmo Meera, GIRl Shakira Amour [[id number]]         DIOTQSL, GIRL 1, philip [[id number]]         Jose Solan GIRL 2 philip [[id number]]   None    Cervical RipeningAnselmo Meera, GIRl Shakira Amour [[id number]]         Anice Humphrey 1, philip [[id number]]         Augustae Landau, GIRL 2 philip [[id number]]            Augustatte Landau, Candido Arango [[id number]]         Jinny Yin 1, philip [[id number]]         Latesha GIRL 2 philip [[id number]]            Naveed Molina, GIRl Macey Niocle [[id number]]         Loida Dahl, Dillon Almanza [[id number]]         Loida Almanza [[id number]]             Hospital Problems  Date Reviewed: 10/8/2012          Codes Class Noted POA    Twin to twin transfusion in third trimester ICD-10-CM: O43.023  ICD-9-CM: 678.03  3/12/2018 Unknown              Operative Vaginal Delivery - none    Group B Strep:   Lab Results   Component Value Date/Time    GrBStrep, External negative 2018     Information for the patient's :  Naveed Molina, Candido Nicole [[id number]]   No results found for: ABORH, PCTABR, PCTDIG, BILI, ABORHEXT, ABORH  Information for the patient's :  Loida Dahl, philip [[id number]]   No results found for: ABORH, PCTABR, PCTDIG, BILI, ABORHEXT, ABORH  Information for the patient's :  Loida Kevin 2 philip [[id number]]   No results found for: ABORH, PCTABR, PCTDIG, BILI, ABORHEXT, ABORH    No results found for: APH, APCO2, APO2, AHCO3, ABEC, ABDC, O2ST, EPHV, PCO2V, PO2V, HCO3V, EBEV, EBDV, SITE, RSCOM

## 2018-03-12 NOTE — H&P
EDC by conception date: 05/15/2018      EDC:2018  EGA: 31 weeks, 2 days      Primary Provider:  Javi Shaw MD    CC:  non reassuring fetal surveillance. History of Present Illness:  17yo  at 31.2w, with mono/di twins who has been monitored closely for possible evoluation of twin-twin transfusion. Today in office, twin A has persistent absent end diastolic flow, and twin B has worsening polyhydramnios. [facility] (Dr. Glenroy Carver), recommends delivery. 18 she was admitted for observation, received BMTZ and Magnesium. Patient's Prenatal Care with Doctor of Record Jennie Monae MD Notable For -    Anemia on FE pregnant  Baby B: LGA with umbilical cord varix, MFM following  HSV-Valtrex @ 39 wks____  Prev LTCS desires repeat: sched 18  RH negative rhogam 28 wks _given ___   lab screening  Twins monochorionic/diamniotic. Early FS @16 with MFM___. FS + ECHO with MFM___. q2week fluid check___. Weekly testing @ 32___. Delivery 34-37. Depression in pregnancy, was on zoloft now off. Family History of Breast Cancer          Impression & Recommendations:    Problem # 1:  Twins monochorionic/diamniotic. Early FS @16 with MFM___. FS + ECHO with MFM___. q2week fluid check___. Weekly testing @ 32___. Delivery 34-37. (ICD-651.03) (MZV66-R67.033)  [facility] recommends delivery for persistent AEDF of twin A, and worsening polyhydradmnios of twin B  - no repeat BMTZ or mag recommended  - CS for hx of prior, and unstable lie      Problem # 2:  Prev LTCS desires repeat: sched 18 (CWG-589.13) (PKB79-K75.211)    Problem # 3:  Depression in pregnancy, was on zoloft now off. (ZBN35-L45.522)  watch in the post-partum period    Problem # 4:  RH negative rhogam 28 wks _given ___ (ICD-V07.2) (HPB93-A56.66)          Past Medical History:     Reviewed history from 10/19/2017 and no changes required:        HSV pos.     Past Surgical History:     Reviewed history from 10/19/2017 and no changes required:        1LTCS for IUGR/[place]lig[place]/breech at 37wks. infant in NICU for grunting. [id number]  [place]         8/10/12 suction D&C for retained poc. Jaylene Bliss         03/25/15 RLTCS Female Dr. Johnny Hoover     Family History Summary:      Reviewed history Last on 02/27/2018 and no changes required:03/12/2018      General Comments - FH:  Family history transferred to [address] [place] [address] And [address] [place] [address]     Social History:     Reviewed history from 10/19/2017 and no changes required:        Not Stable Relationship 2017                Working -unemployed                Smoking History:        Patient has never smoked. Review of Systems        See HPI    Except as noted in the HPI, the review of systems is negative for General, Breast, , Resp, GI, Endo, MS, Psych and Heme. Allergies      Medications Removed from Medication List        [facility] for Follow-up Visit     Estimated weeks of        gestation:  31 2/7     Comment:  H&P for delivery. Vital Signs            Physical Exam     General           General appearance:  no acute distress    Head           Inspection:   normal    Eyes           External:   EOM intact    Chest           Lungs:  clear to auscultation          Heart:  regular rate and rhythm    Extremeties           Extremeties:  L knee swollen (this is not new)    Psych           Orientation:  oriented to time, place, and person          Mood:  no appearance of anxiety, depression, or agitation    Abdomen           Abdomen:  gravid            Impression & Recommendations:    Problem # 1:  Twins monochorionic/diamniotic. Early FS @16 with MFM___. FS + ECHO with MFM___. q2week fluid check___. Weekly testing @ 32___. Delivery 34-37.  (ICD-651.03) (ZJO69-K08.033)  [facility] recommends delivery for persistent AEDF of twin A, and worsening polyhydradmnios of twin B  - no repeat BMTZ or mag recommended  - CS for hx of prior, and unstable lie      Problem # 2:  Prev LTCS desires repeat: sched 4/17/18 (DVQ-446.02) (KEY24-A92.694)    Problem # 3:  Depression in pregnancy, was on zoloft now off. (AAF86-T29.607)  watch in the post-partum period    Problem # 4:  RH negative rhogam 28 wks _given 2/13___ (ICD-V07.2) (FHB58-Z69.13)      Medications (at conclusion of this visit)    12/14/2017 FIORICET -40 MG ORAL CAPSULE (BUTALBITAL-APAP-CAFFEINE) one to two every four to six hours prn headache  09/22/2017 DICLEGIS 10-10 MG ORAL TABLET DELAYED RELEASE (DOXYLAMINE-PYRIDOXINE) take two tablets at bedtime every night, if symptoms persisit add 1 tablet every morning starting Day 3.  08/01/2014 PRENATAL ADULT GUMMY/DHA/FA 0.4-25 MG ORAL TABLET CHEWABLE (PRENATAL MV & MIN W/FA-DHA)           LABORATORY DATA   TEST DATE RESULT   Group B Strep culture 03/25/2015 *                                   (Group B Strep Culture Result Field)   Blood Type 10/19/2017 O                                             (Blood Type Result Field)   Rh 10/19/2017 Negative                                   (Rh Result Field)   Rhogam Inj Given 02/13/2018 Full Dose   Tdap Vaccine Given 02/13/2018 Vacc.  [address]   Antibody Screen 02/13/2018 Negative   Rubella  [facility] Reference Ranges On or After 3/10/14                  <0.90              Non-immune      0.90 - 0.99     Equivocal      >0.99              Immune    [facility] Reference Ranges  Before 3/10/14           <5                 Non-immune             5 - 9               Equivocal            >9                 Immune  [facility] Reference Ranges       < Or = 0.90       Negative             0.91-1.09          Equivocal            > Or = 1.10       Positive   10/19/2017     1.27     TPA (T Pallidum Antibodies) 02/13/2018 Negative   Serology (RPR) 03/25/2015 *   HBsAg 10/19/2017 Negative   HIV 10/19/2017 Non Reactive   Hemoglobin 02/13/2018 9.2   Hematocrit 02/13/2018 29.9   Platelets 85/04/8272 211 X10E3/UL   TSH 03/25/2015 *   Urine Culture 10/19/2017 Negative   GC DNA Probe 10/19/2017 Negative   Chlamydia DNA 10/19/2017 Negative   PAP 10/19/2017 NIL   Flu Vaccine Given 10/19/2017 Vacc.  [address]   HGBA1C 03/25/2015 *   HGB Electro     T4, Free 03/25/2015 *   BG Fasting 03/25/2015 *   GTT 1H 50G 02/13/2018 112   GTT 1H 100G 03/25/2015 *   GTT 2H 100G 03/25/2015 *   GTT 3H 100G 03/25/2015 *   Glucose Plasma 03/25/2015 *   CF Accept or Decline 10/19/2017 Declined   CF Screen Result 10/19/2017 Declined   Nuchal Trans 11/06/2017 1.5^1.5 mm&millimeters   AFP Only 12/14/2017 *Screen Negative*   Tetra 12/11/2017 Not Indicated   AFP Serum 03/25/2015 *   CVS 03/25/2015 *   AFP Amniotic 03/25/2015 *   Amnio Karyo 03/25/2015 *   FISH 03/25/2015 *   GC Culture 03/25/2015 *   Chlamydia Cult 03/25/2015 *   Ureaplasma     Mycoplasma     WBC 10/19/2017 9.0 X10E3/UL   RBC 10/19/2017 5.03 X10E6/UL   MCV 10/19/2017 72   MCH 10/19/2017 23.3   MCHC RBC 10/19/2017 32.1     ULTRASOUND DATA   TEST DATE RESULT   Estimated Fetal Weight 02/27/2018 0042^5031 g&grams                                     Weight % 02/27/2018 98^98% %&percent                                                KATHY 07/27/2012 4.338002                    BPP 02/27/2018 8^8 [n/a]&Not applicable   Cervical Length (mm)             Electronically signed by Lashanda Bryant MD on 03/12/2018 at 2:18 PM    ________________________________________________________________________

## 2018-03-12 NOTE — PROGRESS NOTES
1545 Bedside report from Yuridia Gaspar RN. Assumed care of patient. 26 Live female twin 1 delivered via r c/s. Live female twin 2 delivered via r c/s     1645 Patient transferred via stretcher in stable condition to LD room 2 after repeat c/s     1718 Nubain 2.5mg IV given for itching. 1815 Assisted patient with pumping. 1825 Dr. Radha Angel at bedside to give parents update. 1830 30 cc breastmilk taken to NICU.     1905 TRANSFER - OUT REPORT:    Verbal report given to Ros Munoz RN(name) on Kip Showers  being transferred to (unit) for routine progression of care       Report consisted of patients Situation, Background, Assessment and   Recommendations(SBAR). Information from the following report(s) SBAR, Kardex, OR Summary, Procedure Summary, Intake/Output, MAR and Recent Results was reviewed with the receiving nurse. Lines:   Peripheral IV 03/12/18 Left; Lower Arm (Active)   Site Assessment Clean, dry, & intact 3/12/2018  2:55 PM   Phlebitis Assessment 0 3/12/2018  2:55 PM   Infiltration Assessment 0 3/12/2018  2:55 PM   Dressing Status Clean, dry, & intact 3/12/2018  2:55 PM   Dressing Type Transparent 3/12/2018  2:55 PM   Hub Color/Line Status Green 3/12/2018  2:55 PM        Opportunity for questions and clarification was provided.       Patient transported with:   Registered Nurse

## 2018-03-12 NOTE — OP NOTES
Operative Note    Name: Silvestre Mar Record Number: [id number]   YOB: 1995  Today's Date: 2018      Pre-operative Diagnosis: 1. IUP at 31. 2 weeks gest  2. Mono/di Twins  3. Breech presentation of twin A  4. Non-reassuring fetal testing of twin A with persistent absent end diastolic flow  5. Hx of prior  x2      Post-operative Diagnosis: IUP at 31. 2 weeks gest, Twins, Repeat    Operation: low transverse  section Procedure(s):   SECTION MULTIPLE    Surgeon(s):  MD Mary Jo Marinelli MD    Anesthesia: Spinal    Prophylactic Antibiotics: Ancef  DVT Prophylaxis: Sequential Compression Devices         Fetal Description: multiple gestation 2     Birth Information:   Information for the patient's :  Candido Boo [[id number]]   Delivery of a   Female [1] infant on 3/6/2018 at . Apgars were  and . Umbilical Cord:     Umbilical Cord Events:     Placenta:  removal with  appearance. Amniotic Fluid Volume:        Amniotic Fluid Description:       Information for the patient's :  Christina Childress [[id number]]   Delivery of a   Female [1] infant on 3/12/2018 at 4:08 PM. Apgars were 5 and 9. Umbilical Cord:     Umbilical Cord Events:     Placenta:  removal with  appearance. Amniotic Fluid Volume:  Polyhydramic     Amniotic Fluid Description:  Clear    Information for the patient's :  Christina Childress [[id number]]   Delivery of a   Female [1] infant on 3/12/2018 at 4:08 PM. Apgars were 5 and 9. Umbilical Cord:     Umbilical Cord Events:     Placenta:  removal with  appearance. Amniotic Fluid Volume:   Moderate     Amniotic Fluid Description:  Clear        Umbilical Cord: twin placenta/cords  - A - breech, velamentous insertion of cord, 2VC  - B - vertex, normal appearing cord, 3VC    Placenta:  expressed    Estimated Blood Loss (ml):  800    Specimens: placenta           Complications: none    Procedure Detail:      After proper patient identification and consent, the patient was taken to the operating room, where spinal anesthesia was administered and found to be adequate. Cassidy catheter had been placed using sterile technique. The patient was prepped and draped in the normal sterile fashion. The abdomen was entered using the Pfannenstiel technique (prior scar of her skin was excised). The peritoneum was entered well superior to the bladder without any apparent injury. The bladder flap was created without difficulty. A low transverse uterine incision was made with the scalpel and extended. Amniotomy was performed and the fluid was copius amount clear. The babys breech was then delivered atraumatically, and the rest of the body followed quickly and without difficulty. Delayed cord clamping was done per NICU request. The nose and mouth were suctioned. The cord was clamped and cut and the baby was handed off to  staff in attendance. Baby B's vertex was delivered atraumatically, cord was double clamped and cut with delay. Placenta was then removed from the uterus. The uterus was curettaged with a moist lap pad and cleared of all clots and debris. The uterine incision was closed with 0 Vicryl  in running locking fashion with good hemostasis assured. Prior to closing the angle, cytotec 800mcg was placed in the uterine cavity. A second imbricating layer was placed. Good hemostasis was again reassured and the uterus was returned to the abdomen. The pelvis was irrigated with warm normal saline and good hemostasis was again reassured throughout. The fascia was closed with 0 Vicryl in a running fashion. Good hemostasis was assured. The skin was closed with a insorb closure. The patient tolerated the procedure well. Sponge, lap, and needle counts were correct times three and the patient and baby were taken to recovery/postpartum room in stable condition.     Dick Delgado MD   2018  4:36 PM

## 2018-03-12 NOTE — ANESTHESIA PROCEDURE NOTES
Spinal Block    Start time: 3/12/2018 3:42 PM  End time: 3/12/2018 3:46 PM  Performed by: Celsa Granger  Authorized by: Celsa Granger     Pre-procedure:   Indications: primary anesthetic  Preanesthetic Checklist: patient identified, risks and benefits discussed, anesthesia consent, site marked, patient being monitored and timeout performed    Timeout Time: 15:42          Spinal Block:   Patient Position:  Seated    Prep: Betadine      Location:  L3-4  Technique:  Single shot        Needle:   Needle Type:  Pencil-tip  Needle Gauge:  25 G  Attempts:  1      Events: CSF confirmed, no blood with aspiration and no paresthesia        Assessment:  Insertion:  Uncomplicated  Patient tolerance:  Patient tolerated the procedure well with no immediate complications

## 2018-03-13 LAB
ABO + RH BLD: NORMAL
BASOPHILS # BLD: 0 K/UL (ref 0–0.1)
BASOPHILS NFR BLD: 0 % (ref 0–1)
BLOOD BANK CMNT PATIENT-IMP: NORMAL
BLOOD GROUP ANTIBODIES SERPL: NORMAL
BLOOD GROUP ANTIBODIES SERPL: NORMAL
DIFFERENTIAL METHOD BLD: ABNORMAL
EOSINOPHIL # BLD: 0 K/UL (ref 0–0.4)
EOSINOPHIL NFR BLD: 0 % (ref 0–7)
ERYTHROCYTE [DISTWIDTH] IN BLOOD BY AUTOMATED COUNT: 17.1 % (ref 11.5–14.5)
HCT VFR BLD AUTO: 29.4 % (ref 35–47)
HGB BLD-MCNC: 8.8 G/DL (ref 11.5–16)
IMM GRANULOCYTES # BLD: 0.1 K/UL (ref 0–0.04)
IMM GRANULOCYTES NFR BLD AUTO: 1 % (ref 0–0.5)
LYMPHOCYTES # BLD: 1.5 K/UL (ref 0.8–3.5)
LYMPHOCYTES NFR BLD: 15 % (ref 12–49)
MCH RBC QN AUTO: 22.1 PG (ref 26–34)
MCHC RBC AUTO-ENTMCNC: 29.9 G/DL (ref 30–36.5)
MCV RBC AUTO: 73.7 FL (ref 80–99)
MONOCYTES # BLD: 1.3 K/UL (ref 0–1)
MONOCYTES NFR BLD: 13 % (ref 5–13)
NEUTS SEG # BLD: 7 K/UL (ref 1.8–8)
NEUTS SEG NFR BLD: 71 % (ref 32–75)
NRBC # BLD: 0 K/UL (ref 0–0.01)
NRBC BLD-RTO: 0 PER 100 WBC
PLATELET # BLD AUTO: 152 K/UL (ref 150–400)
PMV BLD AUTO: 11.7 FL (ref 8.9–12.9)
RBC # BLD AUTO: 3.99 M/UL (ref 3.8–5.2)
SPECIMEN EXP DATE BLD: NORMAL
WBC # BLD AUTO: 9.9 K/UL (ref 3.6–11)

## 2018-03-13 PROCEDURE — 36415 COLL VENOUS BLD VENIPUNCTURE: CPT | Performed by: OBSTETRICS & GYNECOLOGY

## 2018-03-13 PROCEDURE — 74011250636 HC RX REV CODE- 250/636: Performed by: ANESTHESIOLOGY

## 2018-03-13 PROCEDURE — 86900 BLOOD TYPING SEROLOGIC ABO: CPT | Performed by: OBSTETRICS & GYNECOLOGY

## 2018-03-13 PROCEDURE — 85461 HEMOGLOBIN FETAL: CPT | Performed by: OBSTETRICS & GYNECOLOGY

## 2018-03-13 PROCEDURE — 74011250637 HC RX REV CODE- 250/637: Performed by: OBSTETRICS & GYNECOLOGY

## 2018-03-13 PROCEDURE — 65410000002 HC RM PRIVATE OB

## 2018-03-13 PROCEDURE — 85025 COMPLETE CBC W/AUTO DIFF WBC: CPT | Performed by: OBSTETRICS & GYNECOLOGY

## 2018-03-13 RX ORDER — IBUPROFEN 600 MG/1
600 TABLET ORAL EVERY 6 HOURS
Status: DISCONTINUED | OUTPATIENT
Start: 2018-03-13 | End: 2018-03-15 | Stop reason: HOSPADM

## 2018-03-13 RX ADMIN — OXYCODONE HYDROCHLORIDE AND ACETAMINOPHEN 1 TABLET: 5; 325 TABLET ORAL at 17:38

## 2018-03-13 RX ADMIN — OXYCODONE HYDROCHLORIDE AND ACETAMINOPHEN 1 TABLET: 5; 325 TABLET ORAL at 16:44

## 2018-03-13 RX ADMIN — KETOROLAC TROMETHAMINE 30 MG: 30 INJECTION, SOLUTION INTRAMUSCULAR at 04:33

## 2018-03-13 RX ADMIN — KETOROLAC TROMETHAMINE 30 MG: 30 INJECTION, SOLUTION INTRAMUSCULAR at 10:14

## 2018-03-13 RX ADMIN — SIMETHICONE CHEW TAB 80 MG 80 MG: 80 TABLET ORAL at 20:31

## 2018-03-13 RX ADMIN — Medication 10 ML: at 10:14

## 2018-03-13 RX ADMIN — IBUPROFEN 600 MG: 600 TABLET, FILM COATED ORAL at 16:35

## 2018-03-13 RX ADMIN — SIMETHICONE CHEW TAB 80 MG 80 MG: 80 TABLET ORAL at 16:34

## 2018-03-13 RX ADMIN — OXYCODONE HYDROCHLORIDE AND ACETAMINOPHEN 1 TABLET: 5; 325 TABLET ORAL at 22:14

## 2018-03-13 RX ADMIN — IBUPROFEN 600 MG: 600 TABLET, FILM COATED ORAL at 22:14

## 2018-03-13 NOTE — PROGRESS NOTES
Duramorph  Post-op Pain Progress Note    Post-op day #1 s/p  w/ PF-morphine (\"Duramorph\") for post-op analgesia. Pt is awake and alert. C/o mild pain. Resolved N/V. No pruritis. Continue current analgesic regimen.

## 2018-03-13 NOTE — PROGRESS NOTES
Bedside and Verbal shift change report given to [address] [place] [address] 95 (oncoming nurse) by Aure Cerna RN (offgoing nurse). Report included the following information SBAR, Kardex and MAR.

## 2018-03-13 NOTE — ROUTINE PROCESS
Bedside and Verbal shift change report given to LINDA Joe (oncoming nurse) by INOCENTE Auguste [facility], RN (offgoing nurse). Report included the following information SBAR, Kardex, Procedure Summary, Intake/Output, MAR and Recent Results.

## 2018-03-13 NOTE — PROGRESS NOTES
1905 TRANSFER - IN REPORT:    Verbal report received from INOCENTE Lopes RN(name) on [employer]  being received from L&D(unit) for routine progression of care      Report consisted of patients Situation, Background, Assessment and   Recommendations(SBAR). Information from the following report(s) SBAR, Kardex, OR Summary, Intake/Output, MAR and Recent Results was reviewed with the receiving nurse. Opportunity for questions and clarification was provided. Assessment completed upon patients arrival to unit and care assumed.

## 2018-03-13 NOTE — PROGRESS NOTES
Post-Operative  Day 1    Pascale Charlton     Assessment: Post-Op day 1, stable  Acute blood loss on chronic anemia, hgb 10.0 -> 8.8, asymptomatic    Plan:   1. Routine post-operative care   2. Fe on discharge    Information for the patient's :  Shasha Face, GIRl Priyanka Nielsen [[id number]]   Information for the patient's :  Ricarda Mcdonald [[id number]]   Information for the patient's :  Teresa Moore 2 Matteo Morita [[id number]]   , Low Transverse   Patient doing well without significant complaint. Nausea and vomiting resolved, tolerating liquids, no flatus, berry in place. Vitals:  Visit Vitals    /72 (BP 1 Location: Right arm, BP Patient Position: At rest)    Pulse 77    Temp 98.4 °F (36.9 °C)    Resp 18    Ht 5' 7\" (1.702 m)    Wt 83.5 kg (184 lb)    LMP 2017 (Approximate)    SpO2 91%    Breastfeeding Yes    BMI 28.82 kg/m2     Temp (24hrs), Av.1 °F (36.7 °C), Min:97.4 °F (36.3 °C), Max:98.8 °F (37.1 °C)      Last 24hr Input/Output:    Intake/Output Summary (Last 24 hours) at 18 0859  Last data filed at 18   Gross per 24 hour   Intake             1300 ml   Output             1400 ml   Net             -100 ml          Exam:        Patient without distress. Lungs clear. Abdomen, bowel sounds present, soft, expected tenderness, fundus firm Wound dressing intact     Perineum normal lochia noted               Lower extremities are negative for swelling, cords or tenderness.     Labs:   Lab Results   Component Value Date/Time    WBC 9.9 2018 05:06 AM    WBC 11.9 (H) 2018 02:30 PM    WBC 9.3 2018 07:29 PM    WBC 14.5 (H) 2015 03:00 AM    WBC 10.4 2015 09:50 AM    WBC 8.5 08/10/2012 03:30 PM    WBC 10.8 2012 04:25 PM    HGB 8.8 (L) 2018 05:06 AM    HGB 10.0 (L) 2018 02:30 PM    HGB 8.5 (L) 2018 07:29 PM    HGB 10.6 (L) 2015 03:00 AM    HGB 12.3 2015 09:50 AM    HGB 11.7 08/10/2012 03:30 PM    HGB 10.6 (L) 07/20/2012 04:25 PM    HCT 29.4 (L) 03/13/2018 05:06 AM    HCT 32.0 (L) 03/12/2018 02:30 PM    HCT 27.2 (L) 02/27/2018 07:29 PM    HCT 32.9 (L) 03/26/2015 03:00 AM    HCT 37.7 03/24/2015 09:50 AM    HCT 36.1 08/10/2012 03:30 PM    HCT 32.1 (L) 07/20/2012 04:25 PM    PLATELET 277 30/03/4574 05:06 AM    PLATELET 710 65/88/0369 02:30 PM    PLATELET 099 19/75/1881 07:29 PM    PLATELET 372 27/50/3749 03:00 AM    PLATELET 807 87/47/2448 09:50 AM    PLATELET 136 89/86/6858 03:30 PM    PLATELET 934 54/87/3013 04:25 PM       Recent Results (from the past 24 hour(s))   TYPE & SCREEN    Collection Time: 03/12/18  2:30 PM   Result Value Ref Range    Crossmatch Expiration 03/15/2018     ABO/Rh(D) Bestelda Brinda NEGATIVE     Antibody screen POS     Antibody ID NO ADDITIONAL ANTIBODIES DETECTED     Comment previously identified anti D passively acquired    CBC W/O DIFF    Collection Time: 03/12/18  2:30 PM   Result Value Ref Range    WBC 11.9 (H) 3.6 - 11.0 K/uL    RBC 4.39 3.80 - 5.20 M/uL    HGB 10.0 (L) 11.5 - 16.0 g/dL    HCT 32.0 (L) 35.0 - 47.0 %    MCV 72.9 (L) 80.0 - 99.0 FL    MCH 22.8 (L) 26.0 - 34.0 PG    MCHC 31.3 30.0 - 36.5 g/dL    RDW 17.3 (H) 11.5 - 14.5 %    PLATELET 184 257 - 859 K/uL    NRBC 0.0 0  WBC    ABSOLUTE NRBC 0.00 0.00 - 0.01 K/uL   RH IMMUNE GLOBULIN EVAL-LAB ORDER    Collection Time: 03/13/18  4:59 AM   Result Value Ref Range    ABO/Rh(D) O NEGATIVE     Fetal screen PENDING     WEAK D NEG    CBC WITH AUTOMATED DIFF    Collection Time: 03/13/18  5:06 AM   Result Value Ref Range    WBC 9.9 3.6 - 11.0 K/uL    RBC 3.99 3.80 - 5.20 M/uL    HGB 8.8 (L) 11.5 - 16.0 g/dL    HCT 29.4 (L) 35.0 - 47.0 %    MCV 73.7 (L) 80.0 - 99.0 FL    MCH 22.1 (L) 26.0 - 34.0 PG    MCHC 29.9 (L) 30.0 - 36.5 g/dL    RDW 17.1 (H) 11.5 - 14.5 %    PLATELET 126 733 - 238 K/uL    MPV 11.7 8.9 - 12.9 FL    NRBC 0.0 0  WBC    ABSOLUTE NRBC 0.00 0.00 - 0.01 K/uL    NEUTROPHILS 71 32 - 75 % LYMPHOCYTES 15 12 - 49 %    MONOCYTES 13 5 - 13 %    EOSINOPHILS 0 0 - 7 %    BASOPHILS 0 0 - 1 %    IMMATURE GRANULOCYTES 1 (H) 0.0 - 0.5 %    ABS. NEUTROPHILS 7.0 1.8 - 8.0 K/UL    ABS. LYMPHOCYTES 1.5 0.8 - 3.5 K/UL    ABS. MONOCYTES 1.3 (H) 0.0 - 1.0 K/UL    ABS. EOSINOPHILS 0.0 0.0 - 0.4 K/UL    ABS. BASOPHILS 0.0 0.0 - 0.1 K/UL    ABS. IMM.  GRANS. 0.1 (H) 0.00 - 0.04 K/UL    DF AUTOMATED

## 2018-03-13 NOTE — LACTATION NOTE
Made lactation visit to mother of 31w3d twinsborn by C/S. Infants admitted to NICU. Pt will successfully establish breast milk supply by pumping with a hospital grade pump every 2-3 hours for approximately 20 minutes/8-10 x day with the correct size flange, and suction level for mother's comfort. To maximize milk production, mom taught to incorporate breast massage and hand expression into pumping sessions. All expressed breast milk (EBM) will be provided for infant use, in clean bottles/syringes for storage in NICU breastmilk refrigerator. Patient label with barcode,date and time applied to each container prior to transport to NICU. Proper cleaning of pump parts and good hand hygiene discussed. Mother is advised to rent a hospital grade pump to continue regimen at home. Progress of milk transition, pumping log, expected EBM volumes, care of engorged breasts discussed. The value of bonding with baby emphasized, strategies for participating in infant care, photos, footprints, touch, and holding skin to skin as soon as baby and mother are able have been shown to increase oxytocin levels. The breast will be offered as baby is ready; with the goal of eventual transition to breastfeeding.

## 2018-03-14 PROCEDURE — 74011250636 HC RX REV CODE- 250/636: Performed by: OBSTETRICS & GYNECOLOGY

## 2018-03-14 PROCEDURE — 74011250637 HC RX REV CODE- 250/637: Performed by: OBSTETRICS & GYNECOLOGY

## 2018-03-14 PROCEDURE — 65410000002 HC RM PRIVATE OB

## 2018-03-14 RX ADMIN — HUMAN RHO(D) IMMUNE GLOBULIN 0.3 MG: 300 INJECTION, SOLUTION INTRAMUSCULAR at 08:45

## 2018-03-14 RX ADMIN — OXYCODONE HYDROCHLORIDE AND ACETAMINOPHEN 2 TABLET: 5; 325 TABLET ORAL at 06:30

## 2018-03-14 RX ADMIN — SIMETHICONE CHEW TAB 80 MG 80 MG: 80 TABLET ORAL at 20:51

## 2018-03-14 RX ADMIN — OXYCODONE HYDROCHLORIDE AND ACETAMINOPHEN 2 TABLET: 5; 325 TABLET ORAL at 16:17

## 2018-03-14 RX ADMIN — DOCUSATE SODIUM 100 MG: 100 CAPSULE, LIQUID FILLED ORAL at 06:30

## 2018-03-14 RX ADMIN — IBUPROFEN 600 MG: 600 TABLET, FILM COATED ORAL at 20:51

## 2018-03-14 RX ADMIN — IBUPROFEN 600 MG: 600 TABLET, FILM COATED ORAL at 05:21

## 2018-03-14 RX ADMIN — IBUPROFEN 600 MG: 600 TABLET, FILM COATED ORAL at 13:09

## 2018-03-14 RX ADMIN — OXYCODONE HYDROCHLORIDE AND ACETAMINOPHEN 2 TABLET: 5; 325 TABLET ORAL at 02:01

## 2018-03-14 RX ADMIN — OXYCODONE HYDROCHLORIDE AND ACETAMINOPHEN 2 TABLET: 5; 325 TABLET ORAL at 10:40

## 2018-03-14 RX ADMIN — OXYCODONE HYDROCHLORIDE AND ACETAMINOPHEN 2 TABLET: 5; 325 TABLET ORAL at 20:51

## 2018-03-14 NOTE — LACTATION NOTE
Mother continues to pump for 31 week twin infants in NICU. She is very pleased as her milk is abundant. She is collecting up to 18 ml at last session. We discussed renting a hospital grade pump for the first month, and how to obtain an electric pump through her insurance company.

## 2018-03-14 NOTE — PROGRESS NOTES
Post-Operative  Day 2    Lexx Charlton     Assessment:   Hospital Problems  Date Reviewed: 10/8/2012          Codes Class Noted POA    Twin to twin transfusion in third trimester ICD-10-CM: O43.023  ICD-9-CM: 678.03  3/12/2018 Unknown            Post-Op day 2, doing well  1. S/p LTC/S at 31wk 3d with twin/twin transf. Reverse end diastolic flow of twin A  2. Acute blood loss on chronic anemia, hgb 10.0 -> 8.8, asymptomatic       Plan:   1. Routine post-operative care    Information for the patient's :  Tari Fling GIRl Bernie Anderson [[id number]]   Information for the patient's :  Sis Alberto [[id number]]   Information for the patient's :  Brena Sport 2 Manuel Rodriguez [[id number]]   , Low Transverse   Patient doing well without significant complaint. Nausea and vomiting resolved, tolerating liquids, passing flatus, voiding and ambulating without difficulty. Current Facility-Administered Medications   Medication Dose Route Frequency    ibuprofen (MOTRIN) tablet 600 mg  600 mg Oral Q6H    lactated Ringers infusion  125 mL/hr IntraVENous CONTINUOUS    sodium chloride (NS) flush 5-10 mL  5-10 mL IntraVENous Q8H       Vitals:  Visit Vitals    /78 (BP 1 Location: Left arm, BP Patient Position: At rest)    Pulse 86    Temp 97.8 °F (36.6 °C)    Resp 16    Ht 5' 7\" (1.702 m)    Wt 83.5 kg (184 lb)    LMP 2017 (Approximate)    SpO2 91%    Breastfeeding Yes    BMI 28.82 kg/m2     Temp (24hrs), Av.1 °F (36.7 °C), Min:97.6 °F (36.4 °C), Max:98.7 °F (37.1 °C)        Exam:        Patient without distress. Abdomen, bowel sounds present, soft, expected tenderness, fundus firm                Wound incision clean, dry and intact               Lower extremities are negative for swelling, cords or tenderness.     Labs:   Lab Results   Component Value Date/Time    WBC 9.9 2018 05:06 AM    WBC 11.9 (H) 2018 02:30 PM    WBC 9.3 2018 07:29 PM HGB 8.8 (L) 03/13/2018 05:06 AM    HGB 10.0 (L) 03/12/2018 02:30 PM    HGB 8.5 (L) 02/27/2018 07:29 PM    HCT 29.4 (L) 03/13/2018 05:06 AM    HCT 32.0 (L) 03/12/2018 02:30 PM    HCT 27.2 (L) 02/27/2018 07:29 PM    PLATELET 356 22/89/4419 05:06 AM    PLATELET 958 29/60/2373 02:30 PM    PLATELET 056 29/57/8359 07:29 PM       No results found for this or any previous visit (from the past 24 hour(s)).

## 2018-03-14 NOTE — ROUTINE PROCESS
Bedside shift change report given to BOSSMAN Parker (oncoming nurse) by LINDA Syed RN (offgoing nurse). Report included the following information SBAR, Kardex, Intake/Output, MAR, Accordion and Recent Results.

## 2018-03-14 NOTE — ROUTINE PROCESS
2010- Sbar report received from LINDA Salgado RN.      [phone]- Sbar report given to Radha Quiñones RN

## 2018-03-15 VITALS
WEIGHT: 184 LBS | TEMPERATURE: 97.8 F | HEIGHT: 67 IN | HEART RATE: 80 BPM | DIASTOLIC BLOOD PRESSURE: 70 MMHG | OXYGEN SATURATION: 91 % | SYSTOLIC BLOOD PRESSURE: 120 MMHG | RESPIRATION RATE: 16 BRPM | BODY MASS INDEX: 28.88 KG/M2

## 2018-03-15 LAB
ABO + RH BLD: NORMAL
BLD PROD TYP BPU: NORMAL
BPU ID: NORMAL
FETAL SCREEN,FMHS: NORMAL
STATUS OF UNIT,%ST: NORMAL
UNIT DIVISION, %UDIV: 0
WEAK D AG RBC QL: NORMAL

## 2018-03-15 PROCEDURE — 74011250637 HC RX REV CODE- 250/637: Performed by: OBSTETRICS & GYNECOLOGY

## 2018-03-15 RX ORDER — IBUPROFEN 600 MG/1
600 TABLET ORAL
Qty: 30 TAB | Refills: 6 | Status: ON HOLD | OUTPATIENT
Start: 2018-03-15 | End: 2019-09-02 | Stop reason: CLARIF

## 2018-03-15 RX ORDER — OXYCODONE AND ACETAMINOPHEN 5; 325 MG/1; MG/1
1-2 TABLET ORAL
Qty: 30 TAB | Refills: 0 | Status: SHIPPED | OUTPATIENT
Start: 2018-03-15 | End: 2018-03-30

## 2018-03-15 RX ADMIN — DOCUSATE SODIUM 100 MG: 100 CAPSULE, LIQUID FILLED ORAL at 07:44

## 2018-03-15 RX ADMIN — IBUPROFEN 600 MG: 600 TABLET, FILM COATED ORAL at 03:47

## 2018-03-15 RX ADMIN — OXYCODONE HYDROCHLORIDE AND ACETAMINOPHEN 2 TABLET: 5; 325 TABLET ORAL at 11:48

## 2018-03-15 RX ADMIN — IBUPROFEN 600 MG: 600 TABLET, FILM COATED ORAL at 10:34

## 2018-03-15 RX ADMIN — OXYCODONE HYDROCHLORIDE AND ACETAMINOPHEN 1 TABLET: 5; 325 TABLET ORAL at 03:47

## 2018-03-15 RX ADMIN — OXYCODONE HYDROCHLORIDE AND ACETAMINOPHEN 1 TABLET: 5; 325 TABLET ORAL at 07:44

## 2018-03-15 NOTE — DISCHARGE INSTRUCTIONS
POSTPARTUM DISCHARGE INSTRUCTIONS       Name:  Judge Riggs  YOB: 1995  Admission Diagnosis:  Twin to twin transfusion in third trimester  IUP at 32. 2 weeks gest, Twins, Repeat Twin to Twin Transfusion     Discharge Diagnosis:    Problem List as of 3/15/2018  Date Reviewed: 10/8/2012          Codes Class Noted - Resolved    Twin to twin transfusion in third trimester ICD-10-CM: O43.023  ICD-9-CM: 678.03  3/12/2018 - Present        Adjustment disorder with depressed mood ICD-10-CM: F43.21  ICD-9-CM: 309.0  2017 - Present        Major depression ICD-10-CM: F32.9  ICD-9-CM: 296.20  9/15/2017 - Present        Pregnancy ICD-10-CM: Z34.90  ICD-9-CM: V22.2  3/25/2015 - Present        Retained placenta ICD-10-CM: O73.0  ICD-9-CM: 667.00  8/10/2012 - Present        IUGR (intrauterine growth restriction) ICD-10-CM: [id number]  ICD-9-CM: 764.90  2012 - Present        Rh negative status during pregnancy, antepartum ICD-10-CM: O09.899  ICD-9-CM: V23.89  2012 - Present            Attending Physician:  Monique Nguyen MD    Delivery Type:   Section: What to Expect at Home    Your Recovery:  A  section, or , is surgery to deliver your baby through a cut, called an incision that the doctor makes in your lower belly and uterus. You may have some pain in your lower belly and need pain medicine for 1 to 2 weeks. You can expect some vaginal bleeding for several weeks. You will probably need about 6 weeks to fully recover. It is important to take it easy while the incision is healing. Avoid heavy lifting, strenuous activities, or exercises that strain the belly muscles while you are recovering. Ask a family member or friend for help with housework, cooking, and shopping. This care sheet gives you a general idea about how long it will take for you to recover. But each person recovers at a different pace. Follow the steps below to get better as quickly as possible.     How can you care for yourself at home? Activity  · Rest when you feel tired. Getting enough sleep will help you recover. · Try to walk each day. Start by walking a little more than you did the day before. Bit by bit, increase the amount you walk. Walking boosts blood flow and helps prevent pneumonia, constipation, and blood clots. · Avoid strenuous activities, such as bicycle riding, jogging, weightlifting, and aerobic exercise, for 6 weeks or until your doctor says it is okay. · Until your doctor says it is okay, do not lift anything heavier than your baby. · Do not do sit-ups or other exercise that strain the belly muscles for 6 weeks or until your doctor says it is okay. · Hold a pillow over your incision when you cough or take deep breaths. This will support your belly and decrease your pain. · You may shower as usual. Pat the incision dry when you are done. · You will have some vaginal bleeding. Wear sanitary pads. Do not douche or use tampons until your doctor says it is okay. · Ask your doctor when you can drive again. · You will probably need to take at least 6 weeks off work. It depends on the type of work you do and how you feel. · Wait until you are healed (about 4 to 6 weeks) before you have sexual intercourse. Your doctor will tell you when it is okay to have sex. · Talk to your doctor about birth control. You can get pregnant even before your period returns. Also, you can get pregnant while you are breast-feeding. Incision care  Your skin is your body's first line of defense against germs, but an incision site leaves an easy way for germs to enter your body. To prevent infection:  · Clean your hands frequently and before and after changing any touching any dressings. · If you have strips of tape on the incision, leave the tape on for a week or until it falls off. · Look at your incision closely every day for any changes.  Contact your doctor if you experience any signs of infection, such as fever or increased redness at the surgical site. · Wash the area daily with warm, soapy water, and pat it dry. Don't use hydrogen peroxide or alcohol, which can slow healing. You may cover the area with a gauze bandage if it weeps or rubs against clothing. Change the bandage every day. · Keep the area clean and dry. Diet  · You can eat your normal diet. If your stomach is upset, try bland, low-fat foods like plain rice, broiled chicken, toast, and yogurt. · Drink plenty of fluids (unless your doctor tells you not to). · You may notice that your bowel movements are not regular right after your surgery. This is common. Try to avoid constipation and straining with bowel movements. You may want to take a fiber supplement every day. If you have not had a bowel movement after a couple of days, ask your doctor about taking a mild laxative. · If you are breast-feeding, do not drink any alcohol. Medicines  · Take pain medicines exactly as directed. · If the doctor gave you a prescription medicine for pain, take it as prescribed. · If you are not taking a prescription pain medicine, ask your doctor if you can take an over-the-counter medicine such as acetaminophen (Tylenol), ibuprofen (Advil, Motrin), or naproxen (Aleve), for cramps. Read and follow all instructions on the label. Do not take aspirin, because it can cause more bleeding. Do not take acetaminophen (Tylenol) and other acetaminophen containing medications (i.e. Percocet) at the same time. · If you think your pain medicine is making you sick to your stomach:  · Take your medicine after meals (unless your doctor has told you not to). · Ask your doctor for a different pain medicine. · If your doctor prescribed antibiotics, take them as directed. Do not stop taking them just because you feel better. You need to take the full course of antibiotics. Mental Health  · Many women get the \"baby blues\" during the first few days after childbirth. You may lose sleep, feel irritable, and cry easily. You may feel happy one minute and sad the next. Hormone changes are one cause of these emotional changes. Also, the demands of a new baby, along with visits from relatives or other family needs, add to a mother's stress. The \"baby blues\" often peak around the fourth day. Then they ease up in less than 2 weeks. · If your moodiness or anxiety lasts for more than 2 weeks, or if you feel like life is not worth living, you may have postpartum depression. This is different for each mother. Some mothers with serious depression may worry intensely about their infant's well-being. Others may feel distant from their child. Some mothers might even feel that they might harm their baby. A mother may have signs of paranoia, wondering if someone is watching her. · With all the changes in your life, you may not know if you are depressed. Pregnancy sometimes causes changes in how you feel that are similar to the symptoms of depression. · Symptoms of depression include:  · Feeling sad or hopeless and losing interest in daily activities. These are the most common symptoms of depression. · Sleeping too much or not enough. · Feeling tired. You may feel as if you have no energy. · Eating too much or too little. · [employer] ([employer]) offers a Warm line; Chat with the Expert phone sessions; Information and Articles about Pregnancy and Postpartum Mood Disorders; Comprehensive List of Free Support Groups; Knowledgeable local coordinators who will offer support, information, and resources; Guide to Resources on [employer]; Calendar of events in the  mood disorders community; Latest News and Research; and [facility] [address] for [employer]. Remember - You are not alone; You are not to blame; With help, you will be well. 3-378-329-PPD(4485). WWW. POSTPARTUM. NET   · Writing or talking about death, such as writing suicide notes or talking about guns, knives, or pills. Keep the numbers for these national suicide hotlines: 5-348-097-TALK (7-252.970.7717) and 9-580-DSDPJRG (2-597.464.4449). If you or someone you know talks about suicide or feeling hopeless, get help right away. Other instructions  · If you breast-feed your baby, you may be more comfortable while you are healing if you place the baby so that he or she is not resting on your belly. Try tucking your baby under your arm, with his or her body along the side you will be feeding on. Support your baby's upper body with your arm. With that hand you can control your baby's head to bring his or her mouth to your breast. This is sometimes called the football hold. Follow-up care is a key part of your treatment and safety. Be sure to make and go to all appointments, and call your doctor if you are having problems. It's also a good idea to know your test results and keep a list of the medicines you take. When should you call for help? Call 911 anytime you think you may need emergency care. For example, call if:  · You are thinking of hurting yourself, your baby, or anyone else. · You passed out (lost consciousness). · You have symptoms of a blood clot in your lung (called a pulmonary embolism). These may include:  · Sudden chest pain. · Trouble breathing. · Coughing up blood. Call your doctor now or seek immediate medical care if:    · You have severe vaginal bleeding. · You are soaking through a pad each hour for 2 or more hours. · Your vaginal bleeding seems to be getting heavier or is still bright red 4 days after delivery. · You are dizzy or lightheaded, or you feel like you may faint. · You are vomiting or cannot keep fluids down. · You have a fever. · You have new or more belly pain. · You have loose stitches, or your incision comes open. · You have symptoms of infection, such as:  · Increased pain, swelling, warmth, or redness.   · Red streaks leading from the incision. · Pus draining from the incision. · A fever  · You pass tissue (not just blood). · Your vaginal discharge smells bad. · Your belly feels tender or full and hard. · Your breasts are continuously painful or red. · You feel sad, anxious, or hopeless for more than a few days. · You have sudden, severe pain in your belly. · You have symptoms of a blood clot in your leg (called a deep vein thrombosis), such as:  · Pain in your calf, back of the knee, thigh, or groin. · Redness and swelling in your leg or groin. · You have symptoms of preeclampsia, such as:  · Sudden swelling of your face, hands, or feet. · New vision problems (such as dimness or blurring). · A severe headache. · Your blood pressure is higher than it should be or rises suddenly. · You have new nausea or vomiting. Watch closely for changes in your health, and be sure to contact your doctor if you have any problems. Additional Information:  Not applicable    These are general instructions for a healthy lifestyle:    No smoking/ No tobacco products/ Avoid exposure to second hand smoke    Surgeon General's Warning:  Quitting smoking now greatly reduces serious risk to your health. Obesity, smoking, and sedentary lifestyle greatly increases your risk for illness    A healthy diet, regular physical exercise & weight monitoring are important for maintaining a healthy lifestyle    Recognize signs and symptoms of STROKE:    F-face looks uneven    A-arms unable to move or move unevenly    S-speech slurred or non-existent    T-time-call 911 as soon as signs and symptoms begin - DO NOT go       back to bed or wait to see if you get better - TIME IS BRAIN. I have had the opportunity to make my options or choices for discharge. I have received and understand these instructions.

## 2018-03-15 NOTE — DISCHARGE SUMMARY
Obstetrical Discharge Summary     Name: Meera Martell MRN: [id number]  SSN: [id number]    YOB: 1995  Age: 23 y.o. Sex: female      Admit Date: 3/12/2018    Discharge Date: 3/15/2018     Admitting Physician: Charis Snellen, MD     Attending Physician:  Priti Ravi MD     Admission Diagnoses: Twin to twin transfusion in third trimester  IUP at 32. 2 weeks gest, Twins, Repeat Twin to Twin Transfusion    Discharge Diagnoses:   Information for the patient's :  Sophia Wheeler, GIRl Cruz Parker [[id number]]   Delivery of a   female infant via  on 3/6/2018 at   by . Apgars were  and . Information for the patient's :  Manuel Rutherford [[id number]]   Delivery of a 1.305 kg female infant via  on 3/12/2018 at 4:08 PM  by . Apgars were 5 and 9. Information for the patient's :  Beni Morley 2 Lizzeth Colace [[id number]]   Delivery of a 1.826 kg female infant via , Low Transverse on 3/12/2018 at 4:08 PM  by . Apgars were 5 and 9. Additional Diagnoses:   Hospital Problems  Date Reviewed: 10/8/2012          Codes Class Noted POA    Twin to twin transfusion in third trimester ICD-10-CM: O43.023  ICD-9-CM: 678.03  3/12/2018 Unknown             Lab Results   Component Value Date/Time    Rubella, External immune 10/19/2017    GrBStrep, External negative 2018       Hospital Course: Normal hospital course following the delivery. Disposition at Discharge: Home or self care    Discharged Condition: Stable    Patient Instructions:   Current Discharge Medication List      START taking these medications    Details   oxyCODONE-acetaminophen (PERCOCET) 5-325 mg per tablet Take 1-2 Tabs by mouth every four (4) hours as needed for Pain. Max Daily Amount: 12 Tabs. Qty: 30 Tab, Refills: 0    Associated Diagnoses: Pregnancy, unspecified gestational age      ibuprofen (MOTRIN) 600 mg tablet Take 1 Tab by mouth every six (6) hours as needed for Pain.   Qty: 30 Tab, Refills: 6         CONTINUE these medications which have NOT CHANGED    Details   PRENATAL VITS62/FA/OM3/DHA/EPA (PRENATAL GUMMY PO) Take 2 Tabs by mouth daily. ferrous sulfate (SLOW FE) 142 mg (45 mg iron) ER tablet Take  by mouth two (2) times a day. escitalopram oxalate (LEXAPRO) 5 mg tablet Take 5 mg by mouth daily. Indications: ANXIETY WITH DEPRESSION         STOP taking these medications       raNITIdine (ZANTAC 75) 75 mg tablet Comments:   Reason for Stopping:               Reference my discharge instructions.     Follow-up Appointments   Procedures    FOLLOW UP VISIT Appointment in: 6 Weeks     Standing Status:   Standing     Number of Occurrences:   1     Order Specific Question:   Appointment in     Answer:   6 Weeks        Signed By:  Barbara Napier MD     March 15, 2018

## 2018-03-15 NOTE — LACTATION NOTE
Patient states she continues to pump every 2-3 hours throughout the day. She is collecting up to 60 cc's at each pumping. She has been using breast massage while pumping to increase the milk production. Pt is being discharged today. We talked about the need for a hospital grade breast pump to use at home. She is going to check with her insurance before making a decision about the breast pump. She has an electric breast pump from a previous baby that she can use at home. She states she has no further questions for lactation at this time.

## 2018-03-29 ENCOUNTER — ANESTHESIA (OUTPATIENT)
Dept: SURGERY | Age: 23
End: 2018-03-29
Payer: MEDICAID

## 2018-03-29 ENCOUNTER — ANESTHESIA EVENT (OUTPATIENT)
Dept: SURGERY | Age: 23
End: 2018-03-29
Payer: MEDICAID

## 2018-03-30 ENCOUNTER — HOSPITAL ENCOUNTER (OUTPATIENT)
Age: 23
Discharge: HOME | End: 2018-03-30
Attending: OBSTETRICS & GYNECOLOGY | Admitting: OBSTETRICS & GYNECOLOGY
Payer: MEDICAID

## 2018-03-30 VITALS
BODY MASS INDEX: 24.64 KG/M2 | DIASTOLIC BLOOD PRESSURE: 84 MMHG | RESPIRATION RATE: 12 BRPM | HEART RATE: 50 BPM | SYSTOLIC BLOOD PRESSURE: 118 MMHG | OXYGEN SATURATION: 99 % | WEIGHT: 157 LBS | TEMPERATURE: 98.9 F | HEIGHT: 67 IN

## 2018-03-30 LAB
BASOPHILS # BLD: 0.1 K/UL (ref 0–0.1)
BASOPHILS NFR BLD: 1 % (ref 0–1)
DIFFERENTIAL METHOD BLD: ABNORMAL
EOSINOPHIL # BLD: 0 K/UL (ref 0–0.4)
EOSINOPHIL NFR BLD: 0 % (ref 0–7)
ERYTHROCYTE [DISTWIDTH] IN BLOOD BY AUTOMATED COUNT: 18.1 % (ref 11.5–14.5)
HCT VFR BLD AUTO: 36.6 % (ref 35–47)
HGB BLD-MCNC: 10.7 G/DL (ref 11.5–16)
IMM GRANULOCYTES # BLD: 0 K/UL (ref 0–0.04)
IMM GRANULOCYTES NFR BLD AUTO: 0 % (ref 0–0.5)
LYMPHOCYTES # BLD: 1.8 K/UL (ref 0.8–3.5)
LYMPHOCYTES NFR BLD: 41 % (ref 12–49)
MCH RBC QN AUTO: 21.7 PG (ref 26–34)
MCHC RBC AUTO-ENTMCNC: 29.2 G/DL (ref 30–36.5)
MCV RBC AUTO: 74.1 FL (ref 80–99)
MONOCYTES # BLD: 0.3 K/UL (ref 0–1)
MONOCYTES NFR BLD: 7 % (ref 5–13)
NEUTS SEG # BLD: 2.2 K/UL (ref 1.8–8)
NEUTS SEG NFR BLD: 51 % (ref 32–75)
NRBC # BLD: 0 K/UL (ref 0–0.01)
NRBC BLD-RTO: 0 PER 100 WBC
PLATELET # BLD AUTO: 268 K/UL (ref 150–400)
PMV BLD AUTO: 10.1 FL (ref 8.9–12.9)
RBC # BLD AUTO: 4.94 M/UL (ref 3.8–5.2)
WBC # BLD AUTO: 4.3 K/UL (ref 3.6–11)

## 2018-03-30 PROCEDURE — 87205 SMEAR GRAM STAIN: CPT | Performed by: OBSTETRICS & GYNECOLOGY

## 2018-03-30 PROCEDURE — 86870 RBC ANTIBODY IDENTIFICATION: CPT | Performed by: OBSTETRICS & GYNECOLOGY

## 2018-03-30 PROCEDURE — 74011250636 HC RX REV CODE- 250/636

## 2018-03-30 PROCEDURE — 74011250636 HC RX REV CODE- 250/636: Performed by: ANESTHESIOLOGY

## 2018-03-30 PROCEDURE — 85025 COMPLETE CBC W/AUTO DIFF WBC: CPT | Performed by: OBSTETRICS & GYNECOLOGY

## 2018-03-30 PROCEDURE — 65270000029 HC RM PRIVATE

## 2018-03-30 PROCEDURE — 87075 CULTR BACTERIA EXCEPT BLOOD: CPT | Performed by: OBSTETRICS & GYNECOLOGY

## 2018-03-30 PROCEDURE — 77030018836 HC SOL IRR NACL ICUM -A: Performed by: OBSTETRICS & GYNECOLOGY

## 2018-03-30 PROCEDURE — 74011000250 HC RX REV CODE- 250

## 2018-03-30 PROCEDURE — 86922 COMPATIBILITY TEST ANTIGLOB: CPT | Performed by: OBSTETRICS & GYNECOLOGY

## 2018-03-30 PROCEDURE — 77030005537 HC CATH URETH BARD -A: Performed by: OBSTETRICS & GYNECOLOGY

## 2018-03-30 PROCEDURE — 76210000006 HC OR PH I REC 0.5 TO 1 HR: Performed by: OBSTETRICS & GYNECOLOGY

## 2018-03-30 PROCEDURE — 77030020782 HC GWN BAIR PAWS FLX 3M -B

## 2018-03-30 PROCEDURE — 77030032490 HC SLV COMPR SCD KNE COVD -B: Performed by: OBSTETRICS & GYNECOLOGY

## 2018-03-30 PROCEDURE — 86900 BLOOD TYPING SEROLOGIC ABO: CPT | Performed by: OBSTETRICS & GYNECOLOGY

## 2018-03-30 PROCEDURE — 88305 TISSUE EXAM BY PATHOLOGIST: CPT | Performed by: OBSTETRICS & GYNECOLOGY

## 2018-03-30 PROCEDURE — 74011000250 HC RX REV CODE- 250: Performed by: OBSTETRICS & GYNECOLOGY

## 2018-03-30 PROCEDURE — 86921 COMPATIBILITY TEST INCUBATE: CPT | Performed by: OBSTETRICS & GYNECOLOGY

## 2018-03-30 PROCEDURE — 86920 COMPATIBILITY TEST SPIN: CPT | Performed by: OBSTETRICS & GYNECOLOGY

## 2018-03-30 PROCEDURE — 76010000138 HC OR TIME 0.5 TO 1 HR: Performed by: OBSTETRICS & GYNECOLOGY

## 2018-03-30 PROCEDURE — 76210000026 HC REC RM PH II 1 TO 1.5 HR: Performed by: OBSTETRICS & GYNECOLOGY

## 2018-03-30 PROCEDURE — 36415 COLL VENOUS BLD VENIPUNCTURE: CPT | Performed by: OBSTETRICS & GYNECOLOGY

## 2018-03-30 PROCEDURE — 76060000032 HC ANESTHESIA 0.5 TO 1 HR: Performed by: OBSTETRICS & GYNECOLOGY

## 2018-03-30 RX ORDER — PROPOFOL 10 MG/ML
INJECTION, EMULSION INTRAVENOUS
Status: DISCONTINUED | OUTPATIENT
Start: 2018-03-30 | End: 2018-03-30 | Stop reason: HOSPADM

## 2018-03-30 RX ORDER — CEFAZOLIN SODIUM/WATER 2 G/20 ML
2 SYRINGE (ML) INTRAVENOUS ONCE
Status: DISCONTINUED | OUTPATIENT
Start: 2018-03-30 | End: 2018-03-30 | Stop reason: HOSPADM

## 2018-03-30 RX ORDER — FERRIC SUBSULFATE 20-22G/100
SOLUTION, NON-ORAL MISCELLANEOUS AS NEEDED
Status: DISCONTINUED | OUTPATIENT
Start: 2018-03-30 | End: 2018-03-30 | Stop reason: HOSPADM

## 2018-03-30 RX ORDER — PROPOFOL 10 MG/ML
INJECTION, EMULSION INTRAVENOUS AS NEEDED
Status: DISCONTINUED | OUTPATIENT
Start: 2018-03-30 | End: 2018-03-30 | Stop reason: HOSPADM

## 2018-03-30 RX ORDER — LIDOCAINE HYDROCHLORIDE 10 MG/ML
0.1 INJECTION, SOLUTION EPIDURAL; INFILTRATION; INTRACAUDAL; PERINEURAL AS NEEDED
Status: DISCONTINUED | OUTPATIENT
Start: 2018-03-30 | End: 2018-03-30 | Stop reason: HOSPADM

## 2018-03-30 RX ORDER — FENTANYL CITRATE 50 UG/ML
50 INJECTION, SOLUTION INTRAMUSCULAR; INTRAVENOUS AS NEEDED
Status: DISCONTINUED | OUTPATIENT
Start: 2018-03-30 | End: 2018-03-30 | Stop reason: HOSPADM

## 2018-03-30 RX ORDER — ONDANSETRON 2 MG/ML
INJECTION INTRAMUSCULAR; INTRAVENOUS AS NEEDED
Status: DISCONTINUED | OUTPATIENT
Start: 2018-03-30 | End: 2018-03-30 | Stop reason: HOSPADM

## 2018-03-30 RX ORDER — FENTANYL CITRATE 50 UG/ML
25 INJECTION, SOLUTION INTRAMUSCULAR; INTRAVENOUS
Status: DISCONTINUED | OUTPATIENT
Start: 2018-03-30 | End: 2018-03-30 | Stop reason: HOSPADM

## 2018-03-30 RX ORDER — MORPHINE SULFATE 10 MG/ML
2 INJECTION, SOLUTION INTRAMUSCULAR; INTRAVENOUS
Status: DISCONTINUED | OUTPATIENT
Start: 2018-03-30 | End: 2018-03-30 | Stop reason: HOSPADM

## 2018-03-30 RX ORDER — SODIUM CHLORIDE, SODIUM LACTATE, POTASSIUM CHLORIDE, CALCIUM CHLORIDE 600; 310; 30; 20 MG/100ML; MG/100ML; MG/100ML; MG/100ML
INJECTION, SOLUTION INTRAVENOUS
Status: DISCONTINUED | OUTPATIENT
Start: 2018-03-30 | End: 2018-03-30 | Stop reason: HOSPADM

## 2018-03-30 RX ORDER — SODIUM CHLORIDE 0.9 % (FLUSH) 0.9 %
5-10 SYRINGE (ML) INJECTION AS NEEDED
Status: DISCONTINUED | OUTPATIENT
Start: 2018-03-30 | End: 2018-03-30 | Stop reason: HOSPADM

## 2018-03-30 RX ORDER — MIDAZOLAM HYDROCHLORIDE 1 MG/ML
INJECTION, SOLUTION INTRAMUSCULAR; INTRAVENOUS AS NEEDED
Status: DISCONTINUED | OUTPATIENT
Start: 2018-03-30 | End: 2018-03-30 | Stop reason: HOSPADM

## 2018-03-30 RX ORDER — SODIUM CHLORIDE 9 MG/ML
50 INJECTION, SOLUTION INTRAVENOUS CONTINUOUS
Status: DISCONTINUED | OUTPATIENT
Start: 2018-03-30 | End: 2018-03-30 | Stop reason: HOSPADM

## 2018-03-30 RX ORDER — GLYCOPYRROLATE 0.2 MG/ML
INJECTION INTRAMUSCULAR; INTRAVENOUS AS NEEDED
Status: DISCONTINUED | OUTPATIENT
Start: 2018-03-30 | End: 2018-03-30 | Stop reason: HOSPADM

## 2018-03-30 RX ORDER — MIDAZOLAM HYDROCHLORIDE 1 MG/ML
1 INJECTION, SOLUTION INTRAMUSCULAR; INTRAVENOUS AS NEEDED
Status: DISCONTINUED | OUTPATIENT
Start: 2018-03-30 | End: 2018-03-30 | Stop reason: HOSPADM

## 2018-03-30 RX ORDER — OXYCODONE AND ACETAMINOPHEN 5; 325 MG/1; MG/1
1 TABLET ORAL AS NEEDED
Status: DISCONTINUED | OUTPATIENT
Start: 2018-03-30 | End: 2018-03-30 | Stop reason: HOSPADM

## 2018-03-30 RX ORDER — DIPHENHYDRAMINE HYDROCHLORIDE 50 MG/ML
12.5 INJECTION, SOLUTION INTRAMUSCULAR; INTRAVENOUS AS NEEDED
Status: DISCONTINUED | OUTPATIENT
Start: 2018-03-30 | End: 2018-03-30 | Stop reason: HOSPADM

## 2018-03-30 RX ORDER — SODIUM CHLORIDE, SODIUM LACTATE, POTASSIUM CHLORIDE, CALCIUM CHLORIDE 600; 310; 30; 20 MG/100ML; MG/100ML; MG/100ML; MG/100ML
75 INJECTION, SOLUTION INTRAVENOUS CONTINUOUS
Status: DISCONTINUED | OUTPATIENT
Start: 2018-03-30 | End: 2018-03-30 | Stop reason: HOSPADM

## 2018-03-30 RX ORDER — SODIUM CHLORIDE 0.9 % (FLUSH) 0.9 %
5-10 SYRINGE (ML) INJECTION EVERY 8 HOURS
Status: DISCONTINUED | OUTPATIENT
Start: 2018-03-30 | End: 2018-03-30 | Stop reason: HOSPADM

## 2018-03-30 RX ORDER — KETOROLAC TROMETHAMINE 30 MG/ML
INJECTION, SOLUTION INTRAMUSCULAR; INTRAVENOUS AS NEEDED
Status: DISCONTINUED | OUTPATIENT
Start: 2018-03-30 | End: 2018-03-30 | Stop reason: HOSPADM

## 2018-03-30 RX ORDER — MIDAZOLAM HYDROCHLORIDE 1 MG/ML
0.5 INJECTION, SOLUTION INTRAMUSCULAR; INTRAVENOUS
Status: DISCONTINUED | OUTPATIENT
Start: 2018-03-30 | End: 2018-03-30 | Stop reason: HOSPADM

## 2018-03-30 RX ORDER — METRONIDAZOLE 500 MG/100ML
500 INJECTION, SOLUTION INTRAVENOUS ONCE
Status: COMPLETED | OUTPATIENT
Start: 2018-03-30 | End: 2018-03-30

## 2018-03-30 RX ORDER — DEXAMETHASONE SODIUM PHOSPHATE 4 MG/ML
INJECTION, SOLUTION INTRA-ARTICULAR; INTRALESIONAL; INTRAMUSCULAR; INTRAVENOUS; SOFT TISSUE AS NEEDED
Status: DISCONTINUED | OUTPATIENT
Start: 2018-03-30 | End: 2018-03-30 | Stop reason: HOSPADM

## 2018-03-30 RX ORDER — FENTANYL CITRATE 50 UG/ML
INJECTION, SOLUTION INTRAMUSCULAR; INTRAVENOUS AS NEEDED
Status: DISCONTINUED | OUTPATIENT
Start: 2018-03-30 | End: 2018-03-30 | Stop reason: HOSPADM

## 2018-03-30 RX ORDER — ONDANSETRON 2 MG/ML
4 INJECTION INTRAMUSCULAR; INTRAVENOUS AS NEEDED
Status: DISCONTINUED | OUTPATIENT
Start: 2018-03-30 | End: 2018-03-30 | Stop reason: HOSPADM

## 2018-03-30 RX ADMIN — ONDANSETRON 4 MG: 2 INJECTION INTRAMUSCULAR; INTRAVENOUS at 10:37

## 2018-03-30 RX ADMIN — DEXAMETHASONE SODIUM PHOSPHATE 4 MG: 4 INJECTION, SOLUTION INTRA-ARTICULAR; INTRALESIONAL; INTRAMUSCULAR; INTRAVENOUS; SOFT TISSUE at 10:37

## 2018-03-30 RX ADMIN — FENTANYL CITRATE 25 MCG: 50 INJECTION, SOLUTION INTRAMUSCULAR; INTRAVENOUS at 10:25

## 2018-03-30 RX ADMIN — PROPOFOL 100 MCG/KG/MIN: 10 INJECTION, EMULSION INTRAVENOUS at 10:30

## 2018-03-30 RX ADMIN — METRONIDAZOLE 500 MG: 500 INJECTION, SOLUTION INTRAVENOUS at 10:43

## 2018-03-30 RX ADMIN — FENTANYL CITRATE 25 MCG: 50 INJECTION, SOLUTION INTRAMUSCULAR; INTRAVENOUS at 10:32

## 2018-03-30 RX ADMIN — KETOROLAC TROMETHAMINE 30 MG: 30 INJECTION, SOLUTION INTRAMUSCULAR; INTRAVENOUS at 10:53

## 2018-03-30 RX ADMIN — GLYCOPYRROLATE 0.1 MG: 0.2 INJECTION INTRAMUSCULAR; INTRAVENOUS at 10:37

## 2018-03-30 RX ADMIN — PROPOFOL 30 MG: 10 INJECTION, EMULSION INTRAVENOUS at 10:31

## 2018-03-30 RX ADMIN — FENTANYL CITRATE 25 MCG: 50 INJECTION, SOLUTION INTRAMUSCULAR; INTRAVENOUS at 10:29

## 2018-03-30 RX ADMIN — SODIUM CHLORIDE, SODIUM LACTATE, POTASSIUM CHLORIDE, CALCIUM CHLORIDE: 600; 310; 30; 20 INJECTION, SOLUTION INTRAVENOUS at 10:25

## 2018-03-30 RX ADMIN — FENTANYL CITRATE 25 MCG: 50 INJECTION, SOLUTION INTRAMUSCULAR; INTRAVENOUS at 10:44

## 2018-03-30 RX ADMIN — GLYCOPYRROLATE 0.1 MG: 0.2 INJECTION INTRAMUSCULAR; INTRAVENOUS at 10:41

## 2018-03-30 RX ADMIN — MIDAZOLAM HYDROCHLORIDE 2 MG: 1 INJECTION, SOLUTION INTRAMUSCULAR; INTRAVENOUS at 10:25

## 2018-03-30 RX ADMIN — SODIUM CHLORIDE, SODIUM LACTATE, POTASSIUM CHLORIDE, AND CALCIUM CHLORIDE 75 ML/HR: 600; 310; 30; 20 INJECTION, SOLUTION INTRAVENOUS at 10:17

## 2018-03-30 RX ADMIN — PROPOFOL 20 MG: 10 INJECTION, EMULSION INTRAVENOUS at 10:30

## 2018-03-30 NOTE — DISCHARGE INSTRUCTIONS
Use heat for cramping. Take pain med from home if needed. Dilation and Curettage: What to Expect at Home  Your Recovery  Dilation and curettage (D&C) is a procedure to remove tissue from the inside of the uterus. The doctor used a curved tool, called a curette, to gently scrape tissue from your uterus. You are likely to have a backache, or cramps similar to menstrual cramps, and pass small clots of blood from your vagina for the first few days. You may continue to have light vaginal bleeding for several weeks after the procedure. You will probably be able to go back to most of your normal activities in 1 or 2 days. This care sheet gives you a general idea about how long it will take for you to recover. But each person recovers at a different pace. Follow the steps below to get better as quickly as possible. How can you care for yourself at home? Activity  ? · Rest when you feel tired. Getting enough sleep will help you recover. ? · Avoid strenuous activities, such as bicycle riding, jogging, weight lifting, or aerobic exercise, until your doctor says it is okay. ? · Most women are able to return to work the day after the procedure. ? · You may have some light vaginal bleeding. Wear sanitary pads if needed. Do not douche or use tampons for 2 weeks or until your doctor says it is okay. ? · Ask your doctor when it is okay for you to have sex. ? · If you could become pregnant, talk about birth control with your doctor. Do not try to become pregnant until your doctor says it is okay. Diet  ? · You can eat your normal diet. If your stomach is upset, try bland, low-fat foods like plain rice, broiled chicken, toast, and yogurt. ? · Drink plenty of fluids (unless your doctor tells you not to). Medicines  ? · Your doctor will tell you if and when you can restart your medicines. He or she will also give you instructions about taking any new medicines.    ? · If you take blood thinners, such as warfarin (Coumadin), clopidogrel (Plavix), or aspirin, be sure to talk to your doctor. He or she will tell you if and when to start taking those medicines again. Make sure that you understand exactly what your doctor wants you to do. ? · Be safe with medicines. Take pain medicines exactly as directed. ¨ If the doctor gave you a prescription medicine for pain, take it as prescribed. ¨ If you are not taking a prescription pain medicine, ask your doctor if you can take an over-the-counter medicine. ? · If you think your pain medicine is making you sick to your stomach:  ¨ Take your medicine after meals (unless your doctor has told you not to). ¨ Ask your doctor for a different pain medicine. ? · If your doctor prescribed antibiotics, take them as directed. Do not stop taking them just because you feel better. You need to take the full course of antibiotics. Follow-up care is a key part of your treatment and safety. Be sure to make and go to all appointments, and call your doctor if you are having problems. It's also a good idea to know your test results and keep a list of the medicines you take. When should you call for help? Call 911 anytime you think you may need emergency care. For example, call if:  ? · You passed out (lost consciousness). ? · You have chest pain, are short of breath, or cough up blood. ?Call your doctor now or seek immediate medical care if:  ? · You have bright red vaginal bleeding that soaks one or more pads in an hour, or you have large clots. ? · You have vaginal discharge that increases in amount or smells bad.   ? · You are sick to your stomach or cannot drink fluids. ? · You have pain that does not get better after you take pain medicine. ? · You cannot pass stools or gas. ? · You have symptoms of a blood clot in your leg (called a deep vein thrombosis), such as:  ¨ Pain in your calf, back of the knee, thigh, or groin. ¨ Redness and swelling in your leg.    ? · You have signs of infection, such as:  ¨ Increased pain, swelling, warmth, or redness. ¨ Red streaks leading from the area. ¨ Pus draining from the area. ¨ A fever. ? Watch closely for changes in your health, and be sure to contact your doctor if you have any problems. Where can you learn more? Go to http://katerine-brenna.info/. Enter 333-989-9655 in the search box to learn more about \"Dilation and Curettage: What to Expect at Home. \"  Current as of: March 16, 2017  Content Version: 11.4  © 1472-0916 [employer]. Care instructions adapted under license by [employer] (which disclaims liability or warranty for this information). If you have questions about a medical condition or this instruction, always ask your healthcare professional. [place] [address] any warranty or liability for your use of this information.   Instructions Following Ambulatory Surgery    Activity  · As tolerated and directed by your doctor  · Bathe or shower as directed by your doctor    Diet  · Clear liquids until no nausea or vomiting; then light diet for the first day  · Advance to regular diet on second day, unless your doctor orders otherwise  · If nausea and vomiting continues, call your doctor    Pain  · Take pain medication as directed by your doctor  ·  Call your doctor if pain is NOT relieved by medication  · DO NOT take aspirin or blood thinners until directed by your doctor      Follow-Up Phone Calls  · Will be made nursing staff  · If you have any problems, call your doctor as needed    Call your doctor if  · Excessive bleeding that does not stop after holding mild pressure over the area  · Temperature of 101 degrees F or above  · Redness,excessive swelling or bruising, and/or green or yellow, smelly discharge from incision    After Anesthesia  · For the first 24 hours: DO NOT Drive, Drink alcoholic beverages, or Make important decisions  · Be aware of dizziness following anesthesia and while taking pain medication

## 2018-03-30 NOTE — ANESTHESIA POSTPROCEDURE EVALUATION
Post-Anesthesia Evaluation and Assessment    Patient: Willem Frye MRN: [id number]  SSN: [id number]    YOB: 1995  Age: 23 y.o. Sex: female       Cardiovascular Function/Vital Signs  Visit Vitals    /84    Pulse (!) 50    Temp 37.2 °C (98.9 °F)    Resp 12    Ht 5' 7\" (1.702 m)    Wt 71.2 kg (157 lb)    SpO2 99%    Breastfeeding Yes    BMI 24.59 kg/m2       Patient is status post MAC anesthesia for Procedure(s):  POSTPARTUM DILATATION AND CURETTAGE WITH SUCTION. Nausea/Vomiting: None    Postoperative hydration reviewed and adequate. Pain:  Pain Scale 1: Numeric (0 - 10) (03/30/18 0938)  Pain Intensity 1: 0 (03/30/18 0938)   Managed    Neurological Status:   Neuro (WDL): Within Defined Limits (03/30/18 1107)  Neuro  LUE Motor Response: Purposeful (03/30/18 1107)  LLE Motor Response: Purposeful (03/30/18 1107)  RUE Motor Response: Purposeful (03/30/18 1107)  RLE Motor Response: Purposeful (03/30/18 1107)   At baseline    Mental Status and Level of Consciousness: Alert and oriented     Pulmonary Status:   O2 Device: Room air (03/30/18 1107)   Adequate oxygenation and airway patent    Complications related to anesthesia: None    Post-anesthesia assessment completed.  No concerns    Signed By: Hunter Martinez, DO     March 30, 2018

## 2018-03-30 NOTE — DISCHARGE SUMMARY
Gynecology Discharge Summary     Patient ID:  Abby Mendoza  [id number]  88 y.o.  1995    Admit date: 3/30/2018    Discharge date: 3/30/2018     Admission Diagnoses:   Patient Active Problem List   Diagnosis Code    IUGR (intrauterine growth restriction) [id number]    Rh negative status during pregnancy, antepartum O09.899    Retained placenta O73.0    Pregnancy Z34.90    Major depression F32.9    Adjustment disorder with depressed mood F43.21    Twin to twin transfusion in third trimester O43.023       Discharge Diagnoses: There are no discharge diagnoses documented for the most recent discharge. Patient Active Problem List   Diagnosis Code    IUGR (intrauterine growth restriction) [id number]    Rh negative status during pregnancy, antepartum O09.899    Retained placenta O73.0    Pregnancy Z34.90    Major depression F32.9    Adjustment disorder with depressed mood F43.21    Twin to twin transfusion in third trimester O43.023       Procedures for this admission: Procedure(s):  Radha Course:    Disposition: home    Discharged Condition: stable            Patient Instructions:   Current Discharge Medication List      CONTINUE these medications which have NOT CHANGED    Details   oxyCODONE-acetaminophen (PERCOCET) 5-325 mg per tablet Take 1-2 Tabs by mouth every four (4) hours as needed for Pain. Max Daily Amount: 12 Tabs. Qty: 30 Tab, Refills: 0    Associated Diagnoses: Pregnancy, unspecified gestational age      ibuprofen (MOTRIN) 600 mg tablet Take 1 Tab by mouth every six (6) hours as needed for Pain. Qty: 30 Tab, Refills: 6      PRENATAL VITS62/FA/OM3/DHA/EPA (PRENATAL GUMMY PO) Take 2 Tabs by mouth daily. ferrous sulfate (SLOW FE) 142 mg (45 mg iron) ER tablet Take  by mouth two (2) times a day. escitalopram oxalate (LEXAPRO) 5 mg tablet Take 5 mg by mouth daily.  Indications: ANXIETY WITH DEPRESSION           Activity: See surgical instructions  Diet: Regular Diet  Wound Care: Keep wound clean and dry and None needed        Signed:  Patrica Leon MD  3/30/2018  11:07 AM

## 2018-03-30 NOTE — PERIOP NOTES
Patient: Marya Ramos MRN: [id number]  SSN: [id number]   YOB: 1995  Age: 23 y.o. Sex: female     Patient is status post Procedure(s):  POSTPARTUM DILATATION AND CURETTAGE WITH SUCTION. Surgeon(s) and Role:     * Song Villa MD - Primary    Local/Dose/Irrigation:                    Peripheral IV 03/30/18 Left Wrist (Active)   Site Assessment Clean, dry, & intact 3/30/2018 10:16 AM   Phlebitis Assessment 0 3/30/2018 10:16 AM   Infiltration Assessment 0 3/30/2018 10:16 AM   Dressing Status Clean, dry, & intact; New 3/30/2018 10:16 AM   Dressing Type Tape;Transparent 3/30/2018 10:16 AM   Hub Color/Line Status Green 3/30/2018 10:16 AM                           Dressing/Packing:  Wound Perineum-DRESSING TYPE: Marta-pad (03/30/18 0900)  Splint/Cast:  ]    Other:

## 2018-03-30 NOTE — ANESTHESIA PREPROCEDURE EVALUATION
Anesthetic History   No history of anesthetic complications            Review of Systems / Medical History  Patient summary reviewed, nursing notes reviewed and pertinent labs reviewed    Pulmonary  Within defined limits                 Neuro/Psych         Psychiatric history     Cardiovascular  Within defined limits                Exercise tolerance: >4 METS     GI/Hepatic/Renal  Within defined limits              Endo/Other  Within defined limits           Other Findings              Physical Exam    Airway  Mallampati: II  TM Distance: 4 - 6 cm  Neck ROM: normal range of motion   Mouth opening: Normal     Cardiovascular  Regular rate and rhythm,  S1 and S2 normal,  no murmur, click, rub, or gallop  Rhythm: regular  Rate: normal         Dental  No notable dental hx       Pulmonary  Breath sounds clear to auscultation               Abdominal  GI exam deferred       Other Findings            Anesthetic Plan    ASA: 2  Anesthesia type: MAC          Induction: Intravenous  Anesthetic plan and risks discussed with: Patient

## 2018-03-30 NOTE — H&P
Vital Signs   23Years Old Female  Height:  66 inches  Weight: 157 pounds  BMI:      25.43  BSA:      1.81  BP:       118/70    Past Pregnancy History   : 3  Para:     4  Aborta:  0  Term: 2, Premature: 2-twins, Living Children: 4, Vaginal Deliveries: 0, C-Sections: 3, Elect. Ab: 0, Ectopics: 0    Gynecologic History   Does patient have any problems with urine leakage? no  Does patient have any other bladder problems? no  History of abnormal pap: no  Gardasil Injection History: Complete  Pt currently sexually active: no  Pt ever sexually active: yes  Current Contraception: None. History of STD: no       Visit Type:  Problem GYN  Primary Provider:  Vandana Larry MD    CC:  US Follow Up. History of Present Illness:  Patient seen in follow-up from visit yesterday. Today, patient reports that after she went home yesterday, bleeding returned in gushes. Now resolved again. Pain is controlled with dilaudid. No fevers. Today she reports that she required a D&C for RPOCs after her last delivery as well. TVUS: Uterus mildly enlarged postpartum. Endometrial lining remains thickened and complex, 1.8 cm. Normal right ovary. Left ovary not visualized. Copied from 3/28 note:  \"22 yr old patient presents today for postpartum pain. Had RLTCS on 3/12/18 with 31 week twin girls. She has been seen twice since then with postpartum pain. She was initially seen by Dr. Darren Camara and diagnosed with postpartum endometritis, and was given a 1 week course of Augmentin. She was then seen by Adelso Mathias. An ultrasound was done at this vist showing a 1.7 cm stripe with blood/clot. She was switched to dilaudid for pain and told to complete her course of Augmentin. WBC count at that visit was 6. Since that visit, she has had additional bleeding. For the last 2 days, reports that it has resovled, and she is now having minimal spotting.      She has been checking her temperature every day and has not had any fevers. Does endorse occasional night sweats. No leg swelling. No bowel/bladder symptoms. Breasts are tender, but not red or cracked. Incision still painful. Pain relieved with dilaudid, but has run out of medication. \"    \"Patient previously diagnosed with postpartum endometritis, treated with Augmentin. No other source of pain identified on exam today. Ongoing fundal and incisional tenderness without evidence of incisional infection. Afebrile, non-toxic. Refilled rx for dilaudid. Call with fever/ increasing pain/bleeding overnight. Scheduled for repeat US tomorrow. If persistent thickened stripe, discussed possibility of D&C and admission for IV antibiotics vs. longer course of augmentin with added flagyl. \"              Allergies    This patient has no known allergies. Medications Removed from Medication List          Past Medical History:     Reviewed history from 03/28/2018 and no changes required:        12/2008  gardasil complete         Anxiety/Depression    Past Surgical History:     Reviewed history from 03/28/2018 and no changes required:        LTCS for IUGR/[place]lig[place]/breech at 37wks. infant - NICU for grunting. [id number]  [facility]         8/10/12 suction D&C for retained poc. Alex Vilchis         03/25/15 RLTCS Female Dr. Jesus Farrell         [id number] RLTCS. Twin girls at 32 2/7 wks. Infants to NICU   Dr. James Jeffery    Family History Summary:      Reviewed history Last on 03/28/2018 and no changes required:03/29/2018      General Comments - FH:  Family history transferred to [address] [place] [address]     Social History:     Reviewed history from 10/19/2017 and no changes required:        Not Stable Relationship 2017                Working -unemployed                Smoking History:        Patient has never smoked.       Risk Factors:     Smoked Tobacco Use:  Never smoker  Smokeless Tobacco Use:  yes  Passive smoke exposure:  no  Drug use:  no  HIV high-risk behavior:  No  Caffeine use:  <1 drinks per day  Alcohol use:  no  Exercise:  yes     Times per week:  3     Type of Exercise:  walking  Seatbelt use:  100 %  Sun Exposure:  frequently    Family History Risk Factors:     Family History of MI in females < 72years old:  no     Family History of MI in males < 54years old:  no        Review of Systems        See HPI    Except as noted in the HPI, the review of systems is negative for General, Breast, , Resp, GI, Endo, MS, Psych and Heme. General Medical Physical Exam:     General Appearance:       well developed, well nourished, in no acute distress    Head: Inspection:  normocephalic without obvious abnormalities    Eyes:        External:  EOM intact    Ears, Nose, Throat:        External:  normocephalic and atraumatic       Hearing:  grossly intact    Neck:        Neck:  supple; no masses; trachea midline    Respiratory:        Resp. effort:  no use of accessory muscles       Auscultation:   no rales, rhonchi, or wheezes    Cardiovascular: Auscultation:   normal S1 and  S2; no murmur, rub, or gallop       Peripheral circ: no cyanosis, clubbing, or edema    Gastrointestinal:        Abdomen:  Mild-mod fundal tenderness. Incision well-healed without erythema, drainage, separation. No flucutance under incision. Liver/spleen:   normal to percussion; no enlargement or nodularity       Hernia:  no hernias    Genitourinary:        Ext. genitalia: normal, no lesions or discharge       Urethra:  no discharge       Bladder:  no cystocele       Vagina:  foul gray lochia       Cervix:  normal appearance, no lesions. Uterus:  tenderness       Adnexa:  no masses or tenderness    Musculoskeletal:        Gait/station:  normal gait. No lower extremity edema, redness.      Lymphatic:        Axilla:  no axillary adenopathy       Inguinal:  no inguinal adenopathy    Skin:        Inspection:  no rashes, suspicious lesions, or ulcerations    Neurological:        Other:  grossly intact    Psychiatric:       Orientation:  oriented to time, place, and person            Impression & Recommendations:    Problem # 1:  Endometritis postpartum (ICD-670.14) (CGH84-A63.12)  Reviewed ultrasound today with persistently thickened stripe. In clinical setting of ongoing bleeding, concern for RPOCs. Scheduled for [facility]  tomorrow with Dr. Sarah Villegas at [facility]. Reviewed NPO after MN. Reviewed r/b/a including bleeding, infection, perforation/damage to surrounding structures. Will give IV ancef, flagyl at time of D&C. At discretion of Dr. Sarah Villegas if patient requires further IV abx/post-op observation.     Orders:  Detailed Moderate Est level 4 ([id number])      Medications (at conclusion of this visit)    03/29/2018 FERROUS SULFATE TABLET (FERROUS SULFATE TABS)   03/23/2018 DILAUDID 2 MG ORAL TABLET (HYDROMORPHONE HCL) 1 tab PO every 4-6 hours PRN for pain  03/19/2018 IBUPROFEN CAPSULE (IBUPROFEN CAPS) Prescribed by non 606/706 Josseline Morin MD  08/01/2014 PRENATAL ADULT GUMMY/DHA/FA 0.4-25 MG ORAL TABLET CHEWABLE (PRENATAL MV & MIN W/FA-DHA) Prescribed by non 606/70Comfort Morin MD          Electronically signed by Mildred Gaspar MD on 03/29/2018 at 6:44 PM    ________________________________________________________________________

## 2018-03-30 NOTE — OP NOTES
Operative Report  Dilation and Curettage   Patient Name: Mehul Valencia  : 1995  Date of Surgery:3/30/2018  Preoperative diagnosis: postpartum endometritis  Postoperative diagnosis: postpartum endometritis  Procedure: Procedure(s):  [address] WITH SUCTION  Surgeon: Sid Zelaya MD  Anesthesia: MAC  EBL: less than 50  ccSpecimens:  ID Type Source Tests Collected by Time Destination   1 : UTERINE CONTENT Fresh Uterine  Sid Zelaya MD 3/30/2018 1052 Pathology   1 : ENDOMETRIAL SWAB Tissue Endometrial AEROBIC/ANAEROBIC CULTURE Sid Zelaya MD 3/30/2018 1047 Microbiology     Findings:enlarged uterus, 14w size, anteverted on exam  - sounds to 9cm  - uterus does not reach the level where patient reports pain  - no purulent discharge  - small return of blood on suction    Complications:* No complications entered in OR log *    PROCEDURE:  The patient was taken to the operating room where she was placed in the lithotomy position. Exam under anesthesia was performed. She was prepped and draped in the usual fashion for vaginal surgery. Cervix was visualized with the aid of an open sided vaginal speculum and grasped with a single-tooth tenaculum. A q-tip swab was done for cultures. The cervix was gently dilated to 9 mm. A 9 mm suction curette was introduced until resistance met, activated, and rotated to clear uterus of tissue/clot. Suction was reintroduced to remove clot and tissue. This procedure was repeated until there was no return, and bleeding was minimal.     Instruments were removed. Hemostasis confirmed. Tenaculum sight hemostasis was obtained w monsels. Patient was taken to recovery in stable condition.

## 2018-03-30 NOTE — PROGRESS NOTES
I have reviewed patient's clinical course, ultrasound with images, labs, talked to patient, and did a bimanual exam.    Patient's main concern is pain. It hurts in a focal location, below umbilicus, with any pushing (by exam, or kids bumping the area). She has had no fever, no elevated white count. She has had a few episodes of increased vaginal bleeding, enough to soak a pad in an hour, but then goes hours without bleeding. No bowel symptoms - is passing gas, having BMs, and eating without difficulty. Abdominal exam - skin non-tender, +focal tenderness below umbilicus, seems to be at the level of musculature. cannot feel uterus on exam  Bimanual - vaginal exam reveals a still enlarged uterus, anteverted, it is sore to palpation, but patient reports no pain with palpation internally. Abdominal hand, placed in area of pain, cannot feel uterus at that level.     - reviewed with patient that I am not sure the D&C will help with her pain as her uterus is non-tender to exam. I think her pain may be muscular in nature. - offered 2 options:  1 - methergine -to expel the clot seen on ultrasound exam and slow her bleeding, and avoid risks of surgery. 2- proceed with suction curettage - with the understanding that I am doing this for her bleeding more than the pain. If the retained clot/product is causing her pain, the procedure will help. But there is a signficant chance that it will not help her pain. - we reviewed the risk of perforation of the post-partum uterus, and possible bowel injury. She is concerned about anything that might cause infertility in the future, and we reviewed the risk of scarring with sharp curettage in a potentially infected, post-partum uterus. - will proceed with suction D&C only, no sharp curettage to limit the risks of procedure. She understands that if there is product, it still may not be removed in total by this procedure.

## 2018-03-30 NOTE — IP AVS SNAPSHOT
[address] [facility] 13 
[phone] Patient: Elisha Washburn MRN: [id number] About your hospitalization You were admitted on:  2018 You last received care in the:  [facility] PACU You were discharged on:  2018 Why you were hospitalized Your primary diagnosis was:  Not on File Follow-up Information Follow up With Details Comments Contact Info Brennan Mane MD   [address] 
[phone] Ruben Pichardo MD Schedule an appointment as soon as possible for a visit in 2 week(s)  [address] [facility] 
[phone] Your Scheduled Appointments 2018  SECTION with Ruben Pichardo MD  
MRM 3 LABOR & DELIVERY ([address]) [address] [facility]  
[phone] Discharge Orders None A check erika indicates which time of day the medication should be taken. My Medications CONTINUE taking these medications Instructions Each Dose to Equal  
 Morning Noon Evening Bedtime  
 ibuprofen 600 mg tablet Commonly known as:  MOTRIN Your last dose was: Your next dose is: Take 1 Tab by mouth every six (6) hours as needed for Pain. 600 mg PRENATAL GUMMY PO Your last dose was: Your next dose is: Take 2 Tabs by mouth daily. 2 Tab  
    
   
   
   
  
  
STOP taking these medications LEXAPRO 5 mg tablet Generic drug:  escitalopram oxalate  
   
  
 oxyCODONE-acetaminophen 5-325 mg per tablet Commonly known as:  PERCOCET  
   
  
 SLOW  mg (45 mg iron) ER tablet Generic drug:  ferrous sulfate Discharge Instructions Use heat for cramping. Take pain med from home if needed. Dilation and Curettage: What to Expect at [facility] Your Recovery Dilation and curettage (D&C) is a procedure to remove tissue from the inside of the uterus. The doctor used a curved tool, called a curette, to gently scrape tissue from your uterus. You are likely to have a backache, or cramps similar to menstrual cramps, and pass small clots of blood from your vagina for the first few days. You may continue to have light vaginal bleeding for several weeks after the procedure. You will probably be able to go back to most of your normal activities in 1 or 2 days. This care sheet gives you a general idea about how long it will take for you to recover. But each person recovers at a different pace. Follow the steps below to get better as quickly as possible. How can you care for yourself at home? Activity ? · Rest when you feel tired. Getting enough sleep will help you recover. ? · Avoid strenuous activities, such as bicycle riding, jogging, weight lifting, or aerobic exercise, until your doctor says it is okay. ? · Most women are able to return to work the day after the procedure. ? · You may have some light vaginal bleeding. Wear sanitary pads if needed. Do not douche or use tampons for 2 weeks or until your doctor says it is okay. ? · Ask your doctor when it is okay for you to have sex. ? · If you could become pregnant, talk about birth control with your doctor. Do not try to become pregnant until your doctor says it is okay. Diet ? · You can eat your normal diet. If your stomach is upset, try bland, low-fat foods like plain rice, broiled chicken, toast, and yogurt. ? · Drink plenty of fluids (unless your doctor tells you not to). Medicines ? · Your doctor will tell you if and when you can restart your medicines. He or she will also give you instructions about taking any new medicines.   
? · If you take blood thinners, such as warfarin (Coumadin), clopidogrel (Plavix), or aspirin, be sure to talk to your doctor. He or she will tell you if and when to start taking those medicines again. Make sure that you understand exactly what your doctor wants you to do. ? · Be safe with medicines. Take pain medicines exactly as directed. ¨ If the doctor gave you a prescription medicine for pain, take it as prescribed. ¨ If you are not taking a prescription pain medicine, ask your doctor if you can take an over-the-counter medicine. ? · If you think your pain medicine is making you sick to your stomach: 
¨ Take your medicine after meals (unless your doctor has told you not to). ¨ Ask your doctor for a different pain medicine. ? · If your doctor prescribed antibiotics, take them as directed. Do not stop taking them just because you feel better. You need to take the full course of antibiotics. Follow-up care is a key part of your treatment and safety. Be sure to make and go to all appointments, and call your doctor if you are having problems. It's also a good idea to know your test results and keep a list of the medicines you take. When should you call for help? Call 911 anytime you think you may need emergency care. For example, call if: 
? · You passed out (lost consciousness). ? · You have chest pain, are short of breath, or cough up blood. ?Call your doctor now or seek immediate medical care if: 
? · You have bright red vaginal bleeding that soaks one or more pads in an hour, or you have large clots. ? · You have vaginal discharge that increases in amount or smells bad.  
? · You are sick to your stomach or cannot drink fluids. ? · You have pain that does not get better after you take pain medicine. ? · You cannot pass stools or gas. ? · You have symptoms of a blood clot in your leg (called a deep vein thrombosis), such as: 
¨ Pain in your calf, back of the knee, thigh, or groin. ¨ Redness and swelling in your leg. ? · You have signs of infection, such as: 
¨ Increased pain, swelling, warmth, or redness. ¨ Red streaks leading from the area. ¨ Pus draining from the area. ¨ A fever. ? Watch closely for changes in your health, and be sure to contact your doctor if you have any problems. Where can you learn more? Go to http://katerine-brenna.info/. Enter [phone] in the search box to learn more about \"Dilation and Curettage: What to Expect at Home. \" Current as of: March 16, 2017 Content Version: 11.4 © 3374-6935 [employer]. Care instructions adapted under license by [employer] (which disclaims liability or warranty for this information). If you have questions about a medical condition or this instruction, always ask your healthcare professional. [address] any warranty or liability for your use of this information. Instructions Following Ambulatory Surgery Activity · As tolerated and directed by your doctor · Bathe or shower as directed by your doctor Diet · Clear liquids until no nausea or vomiting; then light diet for the first day · Advance to regular diet on second day, unless your doctor orders otherwise · If nausea and vomiting continues, call your doctor Pain · Take pain medication as directed by your doctor ·  Call your doctor if pain is NOT relieved by medication · DO NOT take aspirin or blood thinners until directed by your doctor Follow-Up Phone Calls · Will be made nursing staff · If you have any problems, call your doctor as needed Call your doctor if 
· Excessive bleeding that does not stop after holding mild pressure over the area · Temperature of 101 degrees F or above · Redness,excessive swelling or bruising, and/or green or yellow, smelly discharge from incision After Anesthesia · For the first 24 hours: DO NOT Drive, Drink alcoholic beverages, or Make important decisions · Be aware of dizziness following anesthesia and while taking pain medication [employer] Announcement We are excited to announce that we are making your provider's discharge notes available to you in [employer]. You will see these notes when they are completed and signed by the physician that discharged you from your recent hospital stay. If you have any questions or concerns about any information you see in [employer], please call the Health Information Department where you were seen or reach out to your Primary Care Provider for more information about your plan of care. Introducing [facility]! [employer] introduces [employer] patient portal. Now you can access parts of your medical record, email your doctor's office, and request medication refills online. 1. In your internet browser, go to [email or web address] 2. Click on the First Time User? Click Here link in the Sign In box. You will see the New Member Sign Up page. 3. Enter your [employer] Access Code exactly as it appears below. You will not need to use this code after youve completed the sign-up process. If you do not sign up before the expiration date, you must request a new code. · [employer] Access Code: [email or web address] Expires: 6/6/2018  3:56 PM 
 
4. Enter the last four digits of your Social Security Number (xxxx) and Date of Birth (mm/dd/yyyy) as indicated and click Submit. You will be taken to the next sign-up page. 5. Create a [employer] ID. This will be your [employer] login ID and cannot be changed, so think of one that is secure and easy to remember. 6. Create a [employer] password. You can change your password at any time. 7. Enter your Password Reset Question and Answer. This can be used at a later time if you forget your password. 8. Enter your e-mail address. You will receive e-mail notification when new information is available in [address] 9. Click Sign Up. You can now view and download portions of your medical record. 10. Click the Download Summary menu link to download a portable copy of your medical information. If you have questions, please visit the Frequently Asked Questions section of the [employer] website. Remember, [employer] is NOT to be used for urgent needs. For medical emergencies, dial 911. Now available from your iPhone and Android! Introducing Clement Davies As a [facility] patient, I wanted to make you aware of our electronic visit tool called Clement Davies. TakeLessons 24/7 allows you to connect within minutes with a medical provider 24 hours a day, seven days a week via a mobile device or tablet or logging into a secure website from your computer. You can access Clement Davies from anywhere in the [place]. A virtual visit might be right for you when you have a simple condition and feel like you just dont want to get out of bed, or cant get away from work for an appointment, when your regular [facility] provider is not available (evenings, weekends or holidays), or when youre out of town and need minor care. Electronic visits cost only $49 and if the [facility] Arachno/7 provider determines a prescription is needed to treat your condition, one can be electronically transmitted to a nearby pharmacy*. Please take a moment to enroll today if you have not already done so. The enrollment process is free and takes just a few minutes. To enroll, please download the [employer]erGate/7 norma to your tablet or phone, or visit [email or web address] to enroll on your computer. And, as an [address] [place] [address] patient with a [employer] account, the results of your visits will be scanned into your electronic medical record and your primary care provider will be able to view the scanned results.    
We urge you to continue to see your regular [facility] provider for your ongoing medical care. And while your primary care provider may not be the one available when you seek a Clement Davies virtual visit, the peace of mind you get from getting a real diagnosis real time can be priceless. For more information on Clement Davies, view our Frequently Asked Questions (FAQs) at [email or web address]. Sincerely, 
 
Maeve Harmon MD 
Chief Medical Officer [employer] *:  certain medications cannot be prescribed via Clement HolbrookEeBria Unresulted Labs-Please follow up with your PCP about these lab tests Order Current Status CULTURE, ANAEROBIC In process CULTURE, WOUND W GRAM STAIN In process Providers Seen During Your Hospitalization Provider Specialty Primary office phone Beau Ricketts MD Obstetrics & Gynecology [phone] Your Primary Care Physician (PCP) Primary Care Physician Office Phone Office Fax Peri Stephens [address] You are allergic to the following No active allergies Recent Documentation Height Weight Breastfeeding? BMI OB Status Smoking Status 1.702 m 71.2 kg Yes 24.59 kg/m2 Recent pregnancy Former Smoker Emergency Contacts Name Discharge Info Relation Home Work Mobile [facility] N/A  AT THIS TIME [6] Mother [14] [phone] Patient Belongings The following personal items are in your possession at time of discharge: 
  Dental Appliances: None         Home Medications: None   Jewelry: None  Clothing: Other (comment) (CLOTHING, SHOES & PURSE TO PACU)    Other Valuables: Other (comment) (PERSONAL BELONGINGS TO SECURITY) Please provide this summary of care documentation to your next provider. Signatures-by signing, you are acknowledging that this After Visit Summary has been reviewed with you and you have received a copy.   
  
 
  
    
    
 Patient Signature: ____________________________________________________________ Date:  ____________________________________________________________  
  
Rashad Matsu Provider Signature:  ____________________________________________________________ Date:  ____________________________________________________________

## 2018-04-01 LAB
ABO + RH BLD: NORMAL
BACTERIA SPEC CULT: NORMAL
BLD PROD TYP BPU: NORMAL
BLD PROD TYP BPU: NORMAL
BLOOD BANK CMNT PATIENT-IMP: NORMAL
BLOOD GROUP ANTIBODIES SERPL: NORMAL
BLOOD GROUP ANTIBODIES SERPL: NORMAL
BPU ID: NORMAL
BPU ID: NORMAL
CROSSMATCH RESULT,%XM: NORMAL
CROSSMATCH RESULT,%XM: NORMAL
SERVICE CMNT-IMP: NORMAL
SPECIMEN EXP DATE BLD: NORMAL
STATUS OF UNIT,%ST: NORMAL
STATUS OF UNIT,%ST: NORMAL
UNIT DIVISION, %UDIV: 0
UNIT DIVISION, %UDIV: 0

## 2018-04-02 LAB
BACTERIA SPEC CULT: ABNORMAL
BACTERIA SPEC CULT: ABNORMAL
GRAM STN SPEC: ABNORMAL
SERVICE CMNT-IMP: ABNORMAL

## 2019-02-25 LAB
ANTIBODY SCREEN, EXTERNAL: NEGATIVE
HBSAG, EXTERNAL: NEGATIVE
HIV, EXTERNAL: NON REACTIVE
N. GONORRHEA, EXTERNAL: NEGATIVE
RUBELLA, EXTERNAL: NORMAL
T. PALLIDUM, EXTERNAL: NEGATIVE

## 2019-04-24 LAB — CHLAMYDIA, EXTERNAL: NEGATIVE

## 2019-05-07 NOTE — H&P
Vital Signs   23Years Old Female  Height:  66 inches  Weight: 157 pounds  BMI:      25.43  BSA:      1.81  BP:       118/70    Past Pregnancy History   : 3  Para:     4  Aborta:  0  Term: 2, Premature: 2-twins, Living Children: 4, Vaginal Deliveries: 0, C-Sections: 3, Elect. Ab: 0, Ectopics: 0    Gynecologic History   Does patient have any problems with urine leakage? no  Does patient have any other bladder problems? no  History of abnormal pap: no  Gardasil Injection History: Complete  Pt currently sexually active: no  Pt ever sexually active: yes  Current Contraception: None. History of STD: no       Visit Type:  Problem GYN  Primary Provider:  Lizbeth Cesar MD    CC:  US Follow Up. History of Present Illness:  Patient seen in follow-up from visit yesterday. Today, patient reports that after she went home yesterday, bleeding returned in gushes. Now resolved again. Pain is controlled with dilaudid. No fevers. Today she reports that she required a D&C for RPOCs after her last delivery as well. TVUS: Uterus mildly enlarged postpartum. Endometrial lining remains thickened and complex, 1.8 cm. Normal right ovary. Left ovary not visualized. Copied from 3/28 note:  \"22 yr old patient presents today for postpartum pain. Had RLTCS on 3/12/18 with 31 week twin girls. She has been seen twice since then with postpartum pain. She was initially seen by Dr. Evelia Patterson and diagnosed with postpartum endometritis, and was given a 1 week course of Augmentin. She was then seen by Tram Daniels. An ultrasound was done at this vist showing a 1.7 cm stripe with blood/clot. She was switched to dilaudid for pain and told to complete her course of Augmentin. WBC count at that visit was 6. Since that visit, she has had additional bleeding. For the last 2 days, reports that it has resovled, and she is now having minimal spotting.      She has been checking her temperature every day and has migranes not had any fevers. Does endorse occasional night sweats. No leg swelling. No bowel/bladder symptoms. Breasts are tender, but not red or cracked. Incision still painful. Pain relieved with dilaudid, but has run out of medication. \"    \"Patient previously diagnosed with postpartum endometritis, treated with Augmentin. No other source of pain identified on exam today. Ongoing fundal and incisional tenderness without evidence of incisional infection. Afebrile, non-toxic. Refilled rx for dilaudid. Call with fever/ increasing pain/bleeding overnight. Scheduled for repeat US tomorrow. If persistent thickened stripe, discussed possibility of D&C and admission for IV antibiotics vs. longer course of augmentin with added flagyl. \"              Allergies    This patient has no known allergies. Medications Removed from Medication List          Past Medical History:     Reviewed history from 03/28/2018 and no changes required:        12/2008  gardasil complete         Anxiety/Depression    Past Surgical History:     Reviewed history from 03/28/2018 and no changes required:        LTCS for IUGR/Oligo/breech at 37wks. infant - NICU for grunting. [id number]  [place]         8/10/12 suction D&C for retained poc. Shruthi Brown         03/25/15 RLTCS Female Dr. Damian Donaldson         [id number] RLTCS. Twin girls at 32 2/7 wks. Infants to NICU   Dr. Hua Rizo    Family History Summary:      Reviewed history Last on 03/28/2018 and no changes required:03/29/2018      General Comments - FH:  Family history transferred to [address] [place] [address]     Social History:     Reviewed history from 10/19/2017 and no changes required:        Not Stable Relationship 2017                Working -unemployed                Smoking History:        Patient has never smoked.       Risk Factors:     Smoked Tobacco Use:  Never smoker  Smokeless Tobacco Use:  yes  Passive smoke exposure:  no  Drug use:  no  HIV high-risk behavior:  No  Caffeine use:  <1 drinks per day  Alcohol use:  no  Exercise:  yes     Times per week:  3     Type of Exercise:  walking  Seatbelt use:  100 %  Sun Exposure:  frequently    Family History Risk Factors:     Family History of MI in females < 72years old:  no     Family History of MI in males < 54years old:  no        Review of Systems        See HPI    Except as noted in the HPI, the review of systems is negative for General, Breast, , Resp, GI, Endo, MS, Psych and Heme. General Medical Physical Exam:     General Appearance:       well developed, well nourished, in no acute distress    Head: Inspection:  normocephalic without obvious abnormalities    Eyes:        External:  EOM intact    Ears, Nose, Throat:        External:  normocephalic and atraumatic       Hearing:  grossly intact    Neck:        Neck:  supple; no masses; trachea midline    Respiratory:        Resp. effort:  no use of accessory muscles       Auscultation:   no rales, rhonchi, or wheezes    Cardiovascular: Auscultation:   normal S1 and  S2; no murmur, rub, or gallop       Peripheral circ: no cyanosis, clubbing, or edema    Gastrointestinal:        Abdomen:  Mild-mod fundal tenderness. Incision well-healed without erythema, drainage, separation. No flucutance under incision. Liver/spleen:   normal to percussion; no enlargement or nodularity       Hernia:  no hernias    Genitourinary:        Ext. genitalia: normal, no lesions or discharge       Urethra:  no discharge       Bladder:  no cystocele       Vagina:  foul gray lochia       Cervix:  normal appearance, no lesions. Uterus:  tenderness       Adnexa:  no masses or tenderness    Musculoskeletal:        Gait/station:  normal gait. No lower extremity edema, redness.      Lymphatic:        Axilla:  no axillary adenopathy       Inguinal:  no inguinal adenopathy    Skin:        Inspection:  no rashes, suspicious lesions, or ulcerations    Neurological:        Other:  grossly intact    Psychiatric:       Orientation:  oriented to time, place, and person            Impression & Recommendations:    Problem # 1:  Endometritis postpartum (ICD-670.14) (NKE82-A89.12)  Reviewed ultrasound today with persistently thickened stripe. In clinical setting of ongoing bleeding, concern for RPOCs. [facility]uled for [facility]  tomorrow with Dr. Summer [facility] at [facility]. Reviewed NPO after MN. Reviewed r/b/a including bleeding, infection, perforation/damage to surrounding structures. Will give IV ancef, flagyl at time of D&C. At discretion of Dr. Summer Parkinson if patient requires further IV abx/post-op observation.     Orders:  Detailed Moderate Est level 4 ([id number])      Medications (at conclusion of this visit)    03/29/2018 FERROUS SULFATE TABLET (FERROUS SULFATE TABS)   03/23/2018 DILAUDID 2 MG ORAL TABLET (HYDROMORPHONE HCL) 1 tab PO every 4-6 hours PRN for pain  03/19/2018 IBUPROFEN CAPSULE (IBUPROFEN CAPS) Prescribed by non 606/706 Josseline Morin MD  08/01/2014 PRENATAL ADULT GUMMY/DHA/FA 0.4-25 MG ORAL TABLET CHEWABLE (PRENATAL MV & MIN W/FA-DHA) Prescribed by non Ludy/Nasim Morin MD          Electronically signed by Valdo Jimenez MD on 03/29/2018 at 6:44 PM    ________________________________________________________________________

## 2019-08-29 ENCOUNTER — HOSPITAL ENCOUNTER (OUTPATIENT)
Dept: INFUSION THERAPY | Age: 24
Discharge: HOME | End: 2019-08-29
Payer: MEDICAID

## 2019-08-29 VITALS
WEIGHT: 174.3 LBS | HEART RATE: 61 BPM | OXYGEN SATURATION: 100 % | HEIGHT: 67 IN | DIASTOLIC BLOOD PRESSURE: 60 MMHG | RESPIRATION RATE: 16 BRPM | BODY MASS INDEX: 27.36 KG/M2 | TEMPERATURE: 98.4 F | SYSTOLIC BLOOD PRESSURE: 106 MMHG

## 2019-08-29 PROCEDURE — 96374 THER/PROPH/DIAG INJ IV PUSH: CPT

## 2019-08-29 PROCEDURE — 74011250636 HC RX REV CODE- 250/636

## 2019-08-29 PROCEDURE — 74011000258 HC RX REV CODE- 258

## 2019-08-29 RX ORDER — SODIUM CHLORIDE 0.9 % (FLUSH) 0.9 %
10-40 SYRINGE (ML) INJECTION AS NEEDED
Status: DISCONTINUED | OUTPATIENT
Start: 2019-08-29 | End: 2019-08-30 | Stop reason: HOSPADM

## 2019-08-29 RX ADMIN — Medication 10 ML: at 10:25

## 2019-08-29 RX ADMIN — Medication 10 ML: at 10:50

## 2019-08-29 RX ADMIN — IRON SUCROSE 200 MG: 20 INJECTION, SOLUTION INTRAVENOUS at 10:30

## 2019-08-29 NOTE — PROGRESS NOTES
[facility] Progress Note    1000  Pt arrived ambulatory and in no distress for Venofer Dose 1/5    Assessment completed. Pt 35 wks pregnant and c/o fatigue & SOB upon exertion. 24G PIV established in left [facility] [place] without issue with positive blood return noted. Patient Vitals for the past 12 hrs:   Temp Pulse Resp BP SpO2   08/29/19 1112  61 16 106/60    08/29/19 1001 98.4 °F (36.9 °C) 70 16 97/61 100 %      The following medications administered:  Venofer IV    Pt monitored x 30 mins post infusion. Pt tolerated treatment well. IV flushed per policy and removed, 2x2 and coban placed. Pt provided with education on possible side effects of medication along with discharge instructions. Pt verbalized understanding. 1115  Pt discharged ambulatory in no acute distress.  Next appointment:    Future Appointments   Date Time Provider Annette Ibarra   9/5/2019  2:00 PM CHAIR 2 [address] REG   9/12/2019  2:00 PM CHAIR 3 [address] REG

## 2019-09-02 ENCOUNTER — ANESTHESIA EVENT (OUTPATIENT)
Dept: LABOR AND DELIVERY | Age: 24
DRG: 540 | End: 2019-09-02
Payer: MEDICAID

## 2019-09-02 ENCOUNTER — ANESTHESIA (OUTPATIENT)
Dept: LABOR AND DELIVERY | Age: 24
DRG: 540 | End: 2019-09-02
Payer: MEDICAID

## 2019-09-02 ENCOUNTER — HOSPITAL ENCOUNTER (INPATIENT)
Age: 24
LOS: 3 days | Discharge: HOME | DRG: 540 | End: 2019-09-05
Attending: OBSTETRICS & GYNECOLOGY | Admitting: OBSTETRICS & GYNECOLOGY
Payer: MEDICAID

## 2019-09-02 DIAGNOSIS — Z34.90 PREGNANCY, UNSPECIFIED GESTATIONAL AGE: Primary | ICD-10-CM

## 2019-09-02 LAB
BASOPHILS # BLD: 0 K/UL (ref 0–0.1)
BASOPHILS NFR BLD: 0 % (ref 0–1)
DIFFERENTIAL METHOD BLD: ABNORMAL
EOSINOPHIL # BLD: 0 K/UL (ref 0–0.4)
EOSINOPHIL NFR BLD: 0 % (ref 0–7)
ERYTHROCYTE [DISTWIDTH] IN BLOOD BY AUTOMATED COUNT: 15.8 % (ref 11.5–14.5)
HCT VFR BLD AUTO: 32.9 % (ref 35–47)
HGB BLD-MCNC: 10.2 G/DL (ref 11.5–16)
IMM GRANULOCYTES # BLD AUTO: 0 K/UL (ref 0–0.04)
IMM GRANULOCYTES NFR BLD AUTO: 1 % (ref 0–0.5)
LYMPHOCYTES # BLD: 2 K/UL (ref 0.8–3.5)
LYMPHOCYTES NFR BLD: 28 % (ref 12–49)
MCH RBC QN AUTO: 24.7 PG (ref 26–34)
MCHC RBC AUTO-ENTMCNC: 31 G/DL (ref 30–36.5)
MCV RBC AUTO: 79.7 FL (ref 80–99)
MONOCYTES # BLD: 0.6 K/UL (ref 0–1)
MONOCYTES NFR BLD: 8 % (ref 5–13)
NEUTS SEG # BLD: 4.3 K/UL (ref 1.8–8)
NEUTS SEG NFR BLD: 63 % (ref 32–75)
NRBC # BLD: 0 K/UL (ref 0–0.01)
NRBC BLD-RTO: 0 PER 100 WBC
PLATELET # BLD AUTO: 229 K/UL (ref 150–400)
PMV BLD AUTO: 11.6 FL (ref 8.9–12.9)
RBC # BLD AUTO: 4.13 M/UL (ref 3.8–5.2)
WBC # BLD AUTO: 6.9 K/UL (ref 3.6–11)

## 2019-09-02 PROCEDURE — 74011250636 HC RX REV CODE- 250/636: Performed by: OBSTETRICS & GYNECOLOGY

## 2019-09-02 PROCEDURE — 65410000002 HC RM PRIVATE OB

## 2019-09-02 PROCEDURE — 74011000250 HC RX REV CODE- 250: Performed by: ANESTHESIOLOGY

## 2019-09-02 PROCEDURE — 85025 COMPLETE CBC W/AUTO DIFF WBC: CPT

## 2019-09-02 PROCEDURE — 86900 BLOOD TYPING SEROLOGIC ABO: CPT

## 2019-09-02 PROCEDURE — 99284 EMERGENCY DEPT VISIT MOD MDM: CPT

## 2019-09-02 PROCEDURE — 88307 TISSUE EXAM BY PATHOLOGIST: CPT

## 2019-09-02 PROCEDURE — 74011000250 HC RX REV CODE- 250: Performed by: NURSE ANESTHETIST, CERTIFIED REGISTERED

## 2019-09-02 PROCEDURE — 86921 COMPATIBILITY TEST INCUBATE: CPT

## 2019-09-02 PROCEDURE — 36415 COLL VENOUS BLD VENIPUNCTURE: CPT

## 2019-09-02 PROCEDURE — 74011250636 HC RX REV CODE- 250/636: Performed by: ANESTHESIOLOGY

## 2019-09-02 PROCEDURE — 59025 FETAL NON-STRESS TEST: CPT

## 2019-09-02 PROCEDURE — 86922 COMPATIBILITY TEST ANTIGLOB: CPT

## 2019-09-02 PROCEDURE — 77010026065 HC OXYGEN MINIMUM MEDICAL AIR

## 2019-09-02 PROCEDURE — 77030007866 HC KT SPN ANES BBMI -B: Performed by: ANESTHESIOLOGY

## 2019-09-02 PROCEDURE — 86870 RBC ANTIBODY IDENTIFICATION: CPT

## 2019-09-02 PROCEDURE — 74011250636 HC RX REV CODE- 250/636: Performed by: NURSE ANESTHETIST, CERTIFIED REGISTERED

## 2019-09-02 PROCEDURE — 77030040361 HC SLV COMPR DVT MDII -B

## 2019-09-02 PROCEDURE — 76010000397 HC C SECN MULTIPL FIRST 1 HR: Performed by: OBSTETRICS & GYNECOLOGY

## 2019-09-02 PROCEDURE — 76060000033 HC ANESTHESIA 1 TO 1.5 HR: Performed by: OBSTETRICS & GYNECOLOGY

## 2019-09-02 PROCEDURE — 75410000002 HC LABOR FEE PER 1 HR

## 2019-09-02 PROCEDURE — 76010000398 HC C SECN MULTIPL EA ADDL 0.5 HR: Performed by: OBSTETRICS & GYNECOLOGY

## 2019-09-02 PROCEDURE — 74011250637 HC RX REV CODE- 250/637: Performed by: OBSTETRICS & GYNECOLOGY

## 2019-09-02 PROCEDURE — 76060000078 HC EPIDURAL ANESTHESIA: Performed by: OBSTETRICS & GYNECOLOGY

## 2019-09-02 PROCEDURE — 86920 COMPATIBILITY TEST SPIN: CPT

## 2019-09-02 PROCEDURE — 77030034850

## 2019-09-02 PROCEDURE — 75410000003 HC RECOV DEL/VAG/CSECN EA 0.5 HR

## 2019-09-02 RX ORDER — OXYCODONE AND ACETAMINOPHEN 5; 325 MG/1; MG/1
2 TABLET ORAL
Status: DISCONTINUED | OUTPATIENT
Start: 2019-09-02 | End: 2019-09-05 | Stop reason: HOSPADM

## 2019-09-02 RX ORDER — OXYTOCIN/RINGER'S LACTATE 20/1000 ML
125-500 PLASTIC BAG, INJECTION (ML) INTRAVENOUS ONCE
Status: ACTIVE | OUTPATIENT
Start: 2019-09-02 | End: 2019-09-03

## 2019-09-02 RX ORDER — IBUPROFEN 600 MG/1
600 TABLET ORAL
Status: DISCONTINUED | OUTPATIENT
Start: 2019-09-02 | End: 2019-09-03 | Stop reason: SDUPTHER

## 2019-09-02 RX ORDER — ACETAMINOPHEN 325 MG/1
650 TABLET ORAL
Status: DISCONTINUED | OUTPATIENT
Start: 2019-09-02 | End: 2019-09-05 | Stop reason: HOSPADM

## 2019-09-02 RX ORDER — SODIUM CHLORIDE 0.9 % (FLUSH) 0.9 %
5-40 SYRINGE (ML) INJECTION AS NEEDED
Status: DISCONTINUED | OUTPATIENT
Start: 2019-09-02 | End: 2019-09-05 | Stop reason: HOSPADM

## 2019-09-02 RX ORDER — SODIUM CHLORIDE 0.9 % (FLUSH) 0.9 %
5-40 SYRINGE (ML) INJECTION EVERY 8 HOURS
Status: DISCONTINUED | OUTPATIENT
Start: 2019-09-02 | End: 2019-09-02 | Stop reason: HOSPADM

## 2019-09-02 RX ORDER — DOCUSATE SODIUM 100 MG/1
100 CAPSULE, LIQUID FILLED ORAL 2 TIMES DAILY
Status: DISCONTINUED | OUTPATIENT
Start: 2019-09-02 | End: 2019-09-05 | Stop reason: HOSPADM

## 2019-09-02 RX ORDER — MISOPROSTOL 100 UG/1
TABLET ORAL AS NEEDED
Status: DISCONTINUED | OUTPATIENT
Start: 2019-09-02 | End: 2019-09-02 | Stop reason: HOSPADM

## 2019-09-02 RX ORDER — OXYCODONE AND ACETAMINOPHEN 5; 325 MG/1; MG/1
1-2 TABLET ORAL
Status: DISCONTINUED | OUTPATIENT
Start: 2019-09-02 | End: 2019-09-03 | Stop reason: SDUPTHER

## 2019-09-02 RX ORDER — MORPHINE SULFATE 0.5 MG/ML
INJECTION, SOLUTION EPIDURAL; INTRATHECAL; INTRAVENOUS AS NEEDED
Status: DISCONTINUED | OUTPATIENT
Start: 2019-09-02 | End: 2019-09-02 | Stop reason: HOSPADM

## 2019-09-02 RX ORDER — METHYLERGONOVINE MALEATE 0.2 MG/ML
INJECTION INTRAVENOUS
Status: DISPENSED
Start: 2019-09-02 | End: 2019-09-03

## 2019-09-02 RX ORDER — GLYCOPYRROLATE 0.2 MG/ML
INJECTION INTRAMUSCULAR; INTRAVENOUS AS NEEDED
Status: DISCONTINUED | OUTPATIENT
Start: 2019-09-02 | End: 2019-09-02 | Stop reason: HOSPADM

## 2019-09-02 RX ORDER — SODIUM CHLORIDE, SODIUM LACTATE, POTASSIUM CHLORIDE, CALCIUM CHLORIDE 600; 310; 30; 20 MG/100ML; MG/100ML; MG/100ML; MG/100ML
125 INJECTION, SOLUTION INTRAVENOUS CONTINUOUS
Status: DISCONTINUED | OUTPATIENT
Start: 2019-09-02 | End: 2019-09-05 | Stop reason: HOSPADM

## 2019-09-02 RX ORDER — CEFAZOLIN SODIUM/WATER 2 G/20 ML
2 SYRINGE (ML) INTRAVENOUS ONCE
Status: COMPLETED | OUTPATIENT
Start: 2019-09-02 | End: 2019-09-02

## 2019-09-02 RX ORDER — BUPIVACAINE HYDROCHLORIDE 7.5 MG/ML
INJECTION, SOLUTION EPIDURAL; RETROBULBAR AS NEEDED
Status: DISCONTINUED | OUTPATIENT
Start: 2019-09-02 | End: 2019-09-02 | Stop reason: HOSPADM

## 2019-09-02 RX ORDER — MISOPROSTOL 100 UG/1
TABLET ORAL
Status: DISPENSED
Start: 2019-09-02 | End: 2019-09-03

## 2019-09-02 RX ORDER — SODIUM CHLORIDE 0.9 % (FLUSH) 0.9 %
5-40 SYRINGE (ML) INJECTION EVERY 8 HOURS
Status: DISCONTINUED | OUTPATIENT
Start: 2019-09-02 | End: 2019-09-05 | Stop reason: HOSPADM

## 2019-09-02 RX ORDER — SODIUM CHLORIDE, SODIUM LACTATE, POTASSIUM CHLORIDE, CALCIUM CHLORIDE 600; 310; 30; 20 MG/100ML; MG/100ML; MG/100ML; MG/100ML
1000 INJECTION, SOLUTION INTRAVENOUS CONTINUOUS
Status: DISCONTINUED | OUTPATIENT
Start: 2019-09-02 | End: 2019-09-02 | Stop reason: HOSPADM

## 2019-09-02 RX ORDER — ONDANSETRON 2 MG/ML
4 INJECTION INTRAMUSCULAR; INTRAVENOUS
Status: DISCONTINUED | OUTPATIENT
Start: 2019-09-02 | End: 2019-09-05 | Stop reason: HOSPADM

## 2019-09-02 RX ORDER — SODIUM CHLORIDE 0.9 % (FLUSH) 0.9 %
5-40 SYRINGE (ML) INJECTION AS NEEDED
Status: DISCONTINUED | OUTPATIENT
Start: 2019-09-02 | End: 2019-09-02 | Stop reason: HOSPADM

## 2019-09-02 RX ORDER — OXYTOCIN/RINGER'S LACTATE 20/1000 ML
PLASTIC BAG, INJECTION (ML) INTRAVENOUS
Status: DISCONTINUED | OUTPATIENT
Start: 2019-09-02 | End: 2019-09-02 | Stop reason: HOSPADM

## 2019-09-02 RX ORDER — DIPHENHYDRAMINE HYDROCHLORIDE 50 MG/ML
25-50 INJECTION, SOLUTION INTRAMUSCULAR; INTRAVENOUS
Status: DISCONTINUED | OUTPATIENT
Start: 2019-09-02 | End: 2019-09-05 | Stop reason: HOSPADM

## 2019-09-02 RX ORDER — NALOXONE HYDROCHLORIDE 0.4 MG/ML
0.4 INJECTION, SOLUTION INTRAMUSCULAR; INTRAVENOUS; SUBCUTANEOUS AS NEEDED
Status: DISCONTINUED | OUTPATIENT
Start: 2019-09-02 | End: 2019-09-05 | Stop reason: HOSPADM

## 2019-09-02 RX ORDER — DIPHENHYDRAMINE HCL 25 MG
25 CAPSULE ORAL
Status: DISCONTINUED | OUTPATIENT
Start: 2019-09-02 | End: 2019-09-05 | Stop reason: HOSPADM

## 2019-09-02 RX ORDER — ONDANSETRON 2 MG/ML
INJECTION INTRAMUSCULAR; INTRAVENOUS AS NEEDED
Status: DISCONTINUED | OUTPATIENT
Start: 2019-09-02 | End: 2019-09-02 | Stop reason: HOSPADM

## 2019-09-02 RX ORDER — KETOROLAC TROMETHAMINE 30 MG/ML
15 INJECTION, SOLUTION INTRAMUSCULAR; INTRAVENOUS
Status: DISPENSED | OUTPATIENT
Start: 2019-09-02 | End: 2019-09-03

## 2019-09-02 RX ORDER — METHYLERGONOVINE MALEATE 0.2 MG/ML
INJECTION INTRAVENOUS AS NEEDED
Status: DISCONTINUED | OUTPATIENT
Start: 2019-09-02 | End: 2019-09-02 | Stop reason: HOSPADM

## 2019-09-02 RX ORDER — SODIUM CHLORIDE, SODIUM LACTATE, POTASSIUM CHLORIDE, CALCIUM CHLORIDE 600; 310; 30; 20 MG/100ML; MG/100ML; MG/100ML; MG/100ML
INJECTION, SOLUTION INTRAVENOUS
Status: DISCONTINUED | OUTPATIENT
Start: 2019-09-02 | End: 2019-09-02 | Stop reason: HOSPADM

## 2019-09-02 RX ORDER — IBUPROFEN 400 MG/1
800 TABLET ORAL
Status: DISCONTINUED | OUTPATIENT
Start: 2019-09-02 | End: 2019-09-05 | Stop reason: HOSPADM

## 2019-09-02 RX ORDER — ONDANSETRON 2 MG/ML
4 INJECTION INTRAMUSCULAR; INTRAVENOUS
Status: COMPLETED | OUTPATIENT
Start: 2019-09-02 | End: 2019-09-03

## 2019-09-02 RX ORDER — SIMETHICONE 80 MG
80 TABLET,CHEWABLE ORAL AS NEEDED
Status: DISCONTINUED | OUTPATIENT
Start: 2019-09-02 | End: 2019-09-05 | Stop reason: HOSPADM

## 2019-09-02 RX ORDER — EPHEDRINE SULFATE/0.9% NACL/PF 50 MG/5 ML
SYRINGE (ML) INTRAVENOUS AS NEEDED
Status: DISCONTINUED | OUTPATIENT
Start: 2019-09-02 | End: 2019-09-02 | Stop reason: HOSPADM

## 2019-09-02 RX ADMIN — SODIUM CHLORIDE, SODIUM LACTATE, POTASSIUM CHLORIDE, AND CALCIUM CHLORIDE 125 ML/HR: 600; 310; 30; 20 INJECTION, SOLUTION INTRAVENOUS at 15:57

## 2019-09-02 RX ADMIN — BUPIVACAINE HYDROCHLORIDE 10.5 MG: 7.5 INJECTION, SOLUTION EPIDURAL; RETROBULBAR at 13:14

## 2019-09-02 RX ADMIN — Medication 999 ML/HR: at 13:30

## 2019-09-02 RX ADMIN — GLYCOPYRROLATE 0.2 MG: 0.2 INJECTION, SOLUTION INTRAMUSCULAR; INTRAVENOUS at 13:17

## 2019-09-02 RX ADMIN — Medication 10 MG: at 13:24

## 2019-09-02 RX ADMIN — SODIUM CHLORIDE, SODIUM LACTATE, POTASSIUM CHLORIDE, AND CALCIUM CHLORIDE 125 ML/HR: 600; 310; 30; 20 INJECTION, SOLUTION INTRAVENOUS at 21:47

## 2019-09-02 RX ADMIN — MORPHINE SULFATE 500 MCG: 0.5 INJECTION, SOLUTION EPIDURAL; INTRATHECAL; INTRAVENOUS at 13:14

## 2019-09-02 RX ADMIN — Medication 2 G: at 13:01

## 2019-09-02 RX ADMIN — ONDANSETRON 4 MG: 2 INJECTION INTRAMUSCULAR; INTRAVENOUS at 21:07

## 2019-09-02 RX ADMIN — ONDANSETRON HYDROCHLORIDE 4 MG: 2 INJECTION, SOLUTION INTRAMUSCULAR; INTRAVENOUS at 13:56

## 2019-09-02 RX ADMIN — KETOROLAC TROMETHAMINE 15 MG: 30 INJECTION, SOLUTION INTRAMUSCULAR at 21:07

## 2019-09-02 RX ADMIN — SODIUM CHLORIDE, SODIUM LACTATE, POTASSIUM CHLORIDE, AND CALCIUM CHLORIDE: 600; 310; 30; 20 INJECTION, SOLUTION INTRAVENOUS at 13:01

## 2019-09-02 RX ADMIN — GLYCOPYRROLATE 0.2 MG: 0.2 INJECTION, SOLUTION INTRAMUSCULAR; INTRAVENOUS at 13:24

## 2019-09-02 NOTE — H&P
OB History & Physical    Name: Anabel Katz MRN: [id number]  SSN: [id number]    YOB: 1995  Age: 30 y.o. Sex: female      Subjective:     Reason for Admission:  Pregnancy and contractions    History of Present Illness: Anabel Katz is a 30 y.o.  female with an estimated gestational age of 29w2d with Estimated Date of Delivery: 10/3/19. Patient complains of contractions since earlier this morning, gradually progressive strength and frequency. She denies bleeding, ROM. This is a twin pregnancy, dichorionic and diamniotic, both babies female. Problems with this pregnancy:  Previous CS X 3, two for lamb pregnancies, the most recent for twin gestation, delivered at 34 weeks. The patient reports she has a post-partum D&C about 4 weeks after delivery for bleeding and possible RPOC.     OB History        4    Para   3    Term   2       1    AB        Living   4       SAB        TAB        Ectopic        Molar        Multiple   1    Live Births   4              Past Medical History:   Diagnosis Date    Bronchitis 12/23/15    Short Pump PF note    Major depression 9/15/2017    Rh negative status during pregnancy, antepartum 2012     Past Surgical History:   Procedure Laterality Date     DELIVERY ONLY      ,     HX GYN  2012    c section     HX OTHER SURGICAL      wisdom teeth    HX OTHER SURGICAL      left knee surgery     Social History     Occupational History    Not on file   Tobacco Use    Smoking status: Former Smoker     Packs/day: 0.00     Last attempt to quit: 2017     Years since quittin.9    Smokeless tobacco: Never Used   Substance and Sexual Activity    Alcohol use: No    Drug use: No    Sexual activity: Yes     Partners: Male     Birth control/protection: None     Family History   Problem Relation Age of Onset    Hypertension Mother        No Known Allergies  Prior to Admission medications    Medication Sig Start Date End Date Taking? Authorizing Provider   ACETAMINOPHEN-CAFFEINE PO Take  by mouth. Yes Provider, Historical   fluoxetine HCl (FLUOXETINE PO) Take  by mouth. Yes Provider, Historical   PRENATAL VITS62/FA/OM3/DHA/EPA (PRENATAL GUMMY PO) Take 2 Tabs by mouth daily. Yes Arnaldo Wilhelm MD        Review of Systems:  General: Denies fever, sweats, chills  CV: Denies CP, palpitations  Resp: Denies SOB, cough  GI: Denies nausea, vomiting, diarrhea  : Denies dysura, abnormal frequency, hematuria  Neuro: Denies HA, visual disturbance  All other systems reviewed and are negative    Objective:     Vitals:    Vitals:    19 1134 19 1139   BP: 111/61    Pulse: (!) 58    Resp: 16    Temp: 99.4 °F (37.4 °C)    SpO2: 100%    Weight:  78.9 kg (174 lb)   Height:  5' 7\" (1.702 m)      Temp (24hrs), Av.4 °F (37.4 °C), Min:99.4 °F (37.4 °C), Max:99.4 °F (37.4 °C)    BP  Min: 111/61  Max: 111/61     Physical Exam  General: in NAD  HEENT: normocephalic  Lungs clear  COR regular rate and rhythm, no abnormal murmurs nopted  Back: no CVAT  Abdomen: Gravid, soft, nontender, no abnormal masses, rebound, rigidity, guarding  Fundus: soft, nontender  Extremities: no abnormal cyanosis, clubbing, edema  Skin: warm, dry, no abnormal lesions noted  Neurological: DTR's 1-2+ no clonus  Pelvic:Cervical Exam: 3-4/80/-2  Uterine Activity: no contractions detected  Membranes: no gross evidence of ROM  Fetal Heart Rate: adequate variability and reactivity; no significant abnormal decelerations    Labs:  pending  Bedside ultrasound:  Baby A vertex; Baby B transverse back up vertex in the RUQ. Placentas fundal/ posterior, no noted previa or low implantation seen.     Patient Active Problem List   Diagnosis Code    IUGR (intrauterine growth restriction) [id number]    Rh negative status during pregnancy, antepartum O26.899, Z67.91    Retained placenta O73.0    Pregnancy Z34.90    Major depression F32.9    Adjustment disorder with depressed mood F43.21    Twin to twin transfusion in third trimester O44.200    Labor and delivery, indication for care O75.9     Assessment and Plan:     35 week IUP, twin gestation, early labor  Previous CS X 3. Admit L&D  Proceed with repeat CS--discussed in detail with patient and spouse, including risks: mortality, morbidity, including fetal and maternal injury, urinary tract, bowel, vascular; abnormal placentation; bleeding, infection. Questions answered--patient indicates she understands and wishes to proceed.     Signed By:  Anette Padilla MD     September 2, 2019

## 2019-09-02 NOTE — ANESTHESIA PREPROCEDURE EVALUATION
Anesthetic History   No history of anesthetic complications            Review of Systems / Medical History  Patient summary reviewed, nursing notes reviewed and pertinent labs reviewed    Pulmonary  Within defined limits                 Neuro/Psych         Psychiatric history     Cardiovascular  Within defined limits                Exercise tolerance: >4 METS     GI/Hepatic/Renal  Within defined limits              Endo/Other  Within defined limits           Other Findings   Comments: IUP, twins           Physical Exam    Airway  Mallampati: II  TM Distance: 4 - 6 cm  Neck ROM: normal range of motion   Mouth opening: Normal     Cardiovascular    Rhythm: regular  Rate: abnormal        Comments: HR in 50's Dental  No notable dental hx       Pulmonary  Breath sounds clear to auscultation               Abdominal  GI exam deferred       Other Findings            Anesthetic Plan    ASA: 2  Anesthesia type: spinal          Induction: Intravenous  Anesthetic plan and risks discussed with: Patient

## 2019-09-02 NOTE — PROGRESS NOTES
1305- assumed care of pt who is walking to OR for repeat c/section-twins. 1424- pt back to rm 3310    1620- spoke with dr. Nacho Aguilar regarding urine output. Received order to increase iv rate to 200ml/hr. Will continue to monitor    1630- sbar report to alin,rnc, care turned over at this time    [phone]- dr. Nacho Aguilar updated on urine output. No new orders received.

## 2019-09-02 NOTE — ANESTHESIA POSTPROCEDURE EVALUATION
Procedure(s):   SECTION MULTIPLE. spinal    Anesthesia Post Evaluation        Patient location during evaluation: PACU  Note status: Adequate. Level of consciousness: responsive to verbal stimuli and sleepy but conscious  Pain management: satisfactory to patient  Airway patency: patent  Anesthetic complications: no  Cardiovascular status: acceptable  Respiratory status: acceptable  Hydration status: acceptable  Comments: +Post-Anesthesia Evaluation and Assessment    Patient: Pennie Alvarado MRN: [id number]  SSN: [id number]   YOB: 1995  Age: 30 y.o. Sex: female      Cardiovascular Function/Vital Signs    /89   Pulse (!) 105   Temp 36.4 °C (97.5 °F)   Resp 15   Ht 5' 7\" (1.702 m)   Wt 78.9 kg (174 lb)   SpO2 98%   Breastfeeding? No   BMI 27.25 kg/m²     Patient is status post Procedure(s):   SECTION MULTIPLE. Nausea/Vomiting: Controlled. Postoperative hydration reviewed and adequate. Pain:  Pain Scale 1: Numeric (0 - 10) (19 1230)  Pain Intensity 1: 6 (19 1230)   Managed. Neurological Status: At baseline. Mental Status and Level of Consciousness: Arousable. Pulmonary Status:   O2 Device: Room air (19 1426)   Adequate oxygenation and airway patent. Complications related to anesthesia: None    Post-anesthesia assessment completed. No concerns.     Signed By: Howie Stewart MD    2019  Post anesthesia nausea and vomiting:  controlled      Vitals Value Taken Time   /89 2019  2:26 PM   Temp 36.4 °C (97.5 °F) 2019  2:26 PM   Pulse 105 2019  2:26 PM   Resp 15 2019  2:26 PM   SpO2 98 % 2019  2:26 PM

## 2019-09-02 NOTE — OP NOTES
Operative Note    Name: Silvestre Mar Record Number: [id number]   YOB: 1995  Today's Date: 2019      Pre-operative Diagnosis: 35 weeks twin gestation; active labor; previous  section X 3; abnormal presentation Baby A (oblique) and B (transverse, back up)    Post-operative Diagnosis: same with delivered infants    Operation: Repeat low transverse  section    Surgeon(s):  Susana Morrison MD    Assistant: AUGUST Marrufo    Anesthesia: Spinal    EBL: 1000ml    Prophylactic Antibiotics: Ancef    DVT Prophylaxis: Sequential Compression Devices    Fetal Description: A: female, oblique lie; B: female transverse back up     Birth Information:       Umbilical Cord: 3 vessels present X 2    Placenta:  Manual removal; intact    Specimens: placentas           Complications:  None    Implants: none    Procedure Detail:    After proper patient identification and consent, the patient was taken to the operating room, where spinal anesthesia was placed. . Cassidy catheter was placed using sterile technique. The patient was prepped and draped in the normal sterile fashion. Surgical time-out was completed. The abdomen was entered using the Pfannenstiel technique. The peritoneum was entered bluntly well superior to the bladder without any apparent injury. Colby retractor was placed without difficulty. A low transverse uterine incision was made with the scalpel and extended with blunt finger dissection. Amniotomy was performed and the fluid was medium amount clear. Baby A was then delivered atraumatically. The nose and mouth were suctioned. The cord was clamped and cut and the baby was handed off to NICU staff in attendance. Amniotomy was the completed for Baby B, the feet were grasped and Baby B was delivered breech without difficulty. She was bulb suctions, became active and crying. The cord was clamped and cut and she was passed to the NICU staff.   Cord blood was collected from both cords. Placentas were then removed from the uterus. The uterus was curettaged with a moist lap pad and cleared of all clots and debris. The uterine incision was closed with 0 monocryl, double layer in running locked and imbricating fashion. A single bleeding point in the right mid-portion of the uterine incision was controlled with a figure-of-eight 0-Vicryl. Adequate hemostasis was subsequently noted. The anterior pelvis was irrigated with warm normal saline and several small bleeding points along the peritoneal edge were controlled with Bovie. Adequate hemostasis was noted  throughout. The Colby retractor was removed. The fascia was closed with 1 PDS Stratofix in a running fashion. Subcutaneous tissue was reapproximated with 3-0 vicryl. The skin was closed with Ensorb staples. The patient tolerated the procedure adequately. The berry continued to drain clear yellow urine. Sponge, lap, and needle counts were correct times three and the patient and baby were taken to recovery/postpartum room in stable condition.        MD Imtiaz Johnston MD  September 2, 2019  2:35 PM

## 2019-09-02 NOTE — PROGRESS NOTES
09/02/19 1146   Cervical Exam   Dilation (cm) 3     Dr. Faviola Hatfield in room, SVE done, POC is for repeat c/s

## 2019-09-03 LAB
BASOPHILS # BLD: 0 K/UL (ref 0–0.1)
BASOPHILS NFR BLD: 0 % (ref 0–1)
DIFFERENTIAL METHOD BLD: ABNORMAL
EOSINOPHIL # BLD: 0 K/UL (ref 0–0.4)
EOSINOPHIL NFR BLD: 0 % (ref 0–7)
ERYTHROCYTE [DISTWIDTH] IN BLOOD BY AUTOMATED COUNT: 15.9 % (ref 11.5–14.5)
HCT VFR BLD AUTO: 34.6 % (ref 35–47)
HGB BLD-MCNC: 10.4 G/DL (ref 11.5–16)
IMM GRANULOCYTES # BLD AUTO: 0 K/UL (ref 0–0.04)
IMM GRANULOCYTES NFR BLD AUTO: 0 % (ref 0–0.5)
LYMPHOCYTES # BLD: 2.1 K/UL (ref 0.8–3.5)
LYMPHOCYTES NFR BLD: 21 % (ref 12–49)
MCH RBC QN AUTO: 24.5 PG (ref 26–34)
MCHC RBC AUTO-ENTMCNC: 30.1 G/DL (ref 30–36.5)
MCV RBC AUTO: 81.4 FL (ref 80–99)
MONOCYTES # BLD: 0.9 K/UL (ref 0–1)
MONOCYTES NFR BLD: 9 % (ref 5–13)
NEUTS SEG # BLD: 6.8 K/UL (ref 1.8–8)
NEUTS SEG NFR BLD: 70 % (ref 32–75)
NRBC # BLD: 0 K/UL (ref 0–0.01)
NRBC BLD-RTO: 0 PER 100 WBC
PLATELET # BLD AUTO: 177 K/UL (ref 150–400)
PMV BLD AUTO: 11.1 FL (ref 8.9–12.9)
RBC # BLD AUTO: 4.25 M/UL (ref 3.8–5.2)
WBC # BLD AUTO: 9.8 K/UL (ref 3.6–11)

## 2019-09-03 PROCEDURE — 85461 HEMOGLOBIN FETAL: CPT

## 2019-09-03 PROCEDURE — 74011250636 HC RX REV CODE- 250/636: Performed by: ANESTHESIOLOGY

## 2019-09-03 PROCEDURE — 74011250636 HC RX REV CODE- 250/636: Performed by: OBSTETRICS & GYNECOLOGY

## 2019-09-03 PROCEDURE — 74011250637 HC RX REV CODE- 250/637: Performed by: OBSTETRICS & GYNECOLOGY

## 2019-09-03 PROCEDURE — 86900 BLOOD TYPING SEROLOGIC ABO: CPT

## 2019-09-03 PROCEDURE — 65410000002 HC RM PRIVATE OB

## 2019-09-03 PROCEDURE — 36415 COLL VENOUS BLD VENIPUNCTURE: CPT

## 2019-09-03 PROCEDURE — 77030027138 HC INCENT SPIROMETER -A

## 2019-09-03 PROCEDURE — 85025 COMPLETE CBC W/AUTO DIFF WBC: CPT

## 2019-09-03 RX ADMIN — OXYCODONE AND ACETAMINOPHEN 1 TABLET: 5; 325 TABLET ORAL at 15:26

## 2019-09-03 RX ADMIN — OXYCODONE AND ACETAMINOPHEN 1 TABLET: 5; 325 TABLET ORAL at 23:20

## 2019-09-03 RX ADMIN — ONDANSETRON 4 MG: 2 INJECTION INTRAMUSCULAR; INTRAVENOUS at 05:54

## 2019-09-03 RX ADMIN — IBUPROFEN 800 MG: 400 TABLET, FILM COATED ORAL at 21:10

## 2019-09-03 RX ADMIN — Medication 10 ML: at 05:54

## 2019-09-03 RX ADMIN — SODIUM CHLORIDE, SODIUM LACTATE, POTASSIUM CHLORIDE, AND CALCIUM CHLORIDE 125 ML/HR: 600; 310; 30; 20 INJECTION, SOLUTION INTRAVENOUS at 07:15

## 2019-09-03 RX ADMIN — KETOROLAC TROMETHAMINE 15 MG: 30 INJECTION, SOLUTION INTRAMUSCULAR at 06:04

## 2019-09-03 RX ADMIN — DOCUSATE SODIUM 100 MG: 100 CAPSULE, LIQUID FILLED ORAL at 18:23

## 2019-09-03 RX ADMIN — SIMETHICONE 80 MG: 80 TABLET, CHEWABLE ORAL at 18:23

## 2019-09-03 RX ADMIN — ONDANSETRON 4 MG: 2 INJECTION INTRAMUSCULAR; INTRAVENOUS at 01:11

## 2019-09-03 RX ADMIN — DOCUSATE SODIUM 100 MG: 100 CAPSULE, LIQUID FILLED ORAL at 09:35

## 2019-09-03 RX ADMIN — SIMETHICONE 80 MG: 80 TABLET, CHEWABLE ORAL at 23:20

## 2019-09-03 RX ADMIN — IBUPROFEN 800 MG: 400 TABLET, FILM COATED ORAL at 13:25

## 2019-09-03 RX ADMIN — SIMETHICONE 80 MG: 80 TABLET, CHEWABLE ORAL at 09:35

## 2019-09-03 NOTE — PROGRESS NOTES
Intrathecal/Epidural DuraMorphFollow-up Note    1 Day Post-Op sp Procedure(s):   SECTION MULTIPLE. Visit Vitals  /84 (BP 1 Location: Right arm, BP Patient Position: Sitting)   Pulse (!) 42   Temp 36.9 °C (98.5 °F)   Resp 16   Ht 5' 7\" (1.702 m)   Wt 78.9 kg (174 lb)   SpO2 99%   Breastfeeding? Unknown   BMI 27.25 kg/m²   . Pain is well controlled with DuraMorph. Pain management as per primary service.

## 2019-09-03 NOTE — PROGRESS NOTES
Pt ambulated to bathroom with standby assist of two staff members. No c/o numbness/dizziness. Assisted pt with partial bath and melina care. Pt ambulated back to bed with standby assist of one staff member.

## 2019-09-03 NOTE — PROGRESS NOTES
Problem: Falls - Risk of  Goal: *Absence of Falls  Description  Document Scarlett Sheridan Fall Risk and appropriate interventions in the flowsheet.   Outcome: Progressing Towards Goal  Note:   Fall Risk Interventions:  Mobility Interventions: Patient to call before getting OOB         Medication Interventions: Patient to call before getting OOB    Elimination Interventions: Call light in reach, Patient to call for help with toileting needs

## 2019-09-03 NOTE — PROGRESS NOTES
Report from Brannon 16 encouraged mother to stand, she prefers  To wait until after infant eats. + bs x left side of abdomen, no nausea or vomit since 0400, trying more crackers and water to see if bowel sounds increase. Mother requesting to order breakfast, encouraged bland foods. Will change diet order. Assisted with breastfeeding/latch of baby 1 Deangelo. 0900 pt is pumping  0945 asssisted with baby 2 latch \"Yung\"  56 assisted with baby 1 latch \"Deangelo\" Lactation in to see patient  1115 pt up to shower, berry removed with 400 ml yellow urine, pt has had breafast and has not vomited x 8 hours. Linens changed room cleaned and straigtened. Pt educated to measure urine x 2 , due to void within 6 hours. 1400 pt called to have RN come help her put infants in their cribs. Father of baby in room and laying on sofa. Rn triple wrapped infants, reminded pt to attempt to void after pumping  1915 report to EILEEN Doe

## 2019-09-03 NOTE — LACTATION NOTE
This note was copied from a baby's chart. Well read and experienced mother of twins/her 2nd set. P6 all girls. Pt will successfully establish breastfeeding by feeding in response to early feeding cues or wake every 3h, will obtain deep latch, and will keep log of feedings/output. Taught to BF at hunger cues and or q 2-3 hrs and to offer 10-20 drops of hand expressed colostrum at any non-feeds. Breast Assessment  Left Breast: Small , Medium  Left Nipple: Intact, Everted, Large  Right Breast: Small , Medium  Right Nipple: Intact, Everted, Large  Breast- Feeding Assessment  Attends Breast-Feeding Classes: No  Breast-Feeding Experience: Yes  Breast Trauma/Surgery: No  Type/Quality: Good  Lactation Consultant Visits  Breast-Feedings: Good   Mother/Infant Observation  Mother Observation: Alignment, Lets baby end feeding, Sleepy after feeding, Breast comfortable, Nipple round on release, Thirst, Close hold, Holds breast, Recognizes feeding cues  Infant Observation: Audible swallows, Latches nipple and aereolae, Relaxed after feeding, Breast tissue moves, Lips flanged, lower, Rhythmic suck, Feeding cues, Lips flanged, upper, Frenulum checked, Opens mouth  LATCH Documentation  Latch: Grasps breast, tongue down, lips flanged, rhythmic sucking  Audible Swallowing: A few with stimulation  Type of Nipple: Everted (after stimulation)  Comfort (Breast/Nipple): Soft/non-tender  Hold (Positioning): No assist from staff, mother able to position/hold infant  LATCH Score: 9  Incorporating pumping q feeding for 35 4/7gestation twins. 21 hours of life  Twin A ~ 5 baby led feedings. 23 ml of mother's milk via nuk nipple  5 wet and 5 stools. Results for Natalia Blackmon (MRN [id number]) as of 9/3/2019 11:15   Ref.  Range 9/2/2019 15:21 9/2/2019 20:07 9/2/2019 23:38 9/3/2019 03:17 9/3/2019 06:47 9/3/2019 10:06   GLUCOSE,FAST - POC Latest Ref Range: 50 - 110 mg/dL 46 (LL) 51 63 44 (LL) 45 (LL) 48 (LL)     Twin B ~ 4 baby led feedings. 31.5 ml ebm  6 wet and 4 stools. Results for [facility] 2 Magnus Mcdaniel (MRN [id number]) as of 9/3/2019 11:15   Ref. Range 9/2/2019 15:23 9/2/2019 20:18 9/3/2019 00:00 9/3/2019 03:47 9/3/2019 06:51 9/3/2019 09:55   GLUCOSE,FAST - POC Latest Ref Range: 50 - 110 mg/dL 52 66 58 43 (LL) 43 (LL) 51     Reviewed mother's of twins support groups. LC provided information with review of twin positions for tandem feeding. Lanolin and pads/has a new Spectra II breast pump for prn use. Supportive family and friends. [address] \[facility] # provided.

## 2019-09-03 NOTE — PROGRESS NOTES
Noted 300 ml clear urine assesement completed,  Pt has emesis of 350 ml after drinking ginger ale too quickly  Discussed plan of care, safety of infants, feeding, rhogam blood work, rhogam injection, infants sizes/blood sugar and feeding needs. Discussed use of pump after feeding attempts to give infant supplementation due to weight and size. 1830 600 ml clear urine emptied of berry, pt has emesis of 500 ml after drinking room temp water too fast.   Pump set up with instructions on use. 1915 report to MYESHA Corona

## 2019-09-03 NOTE — PROGRESS NOTES
Post-Operative  Day 1    Lorrielda Rileypepe     Assessment: Post-Op day 1, stable from RLTCS p PTL of di/di twins at 35 weeks. Hgb 10.2>10.4. Plan:   1. Routine post-operative care   2. Di/di twins both rooming in with patient    Information for the patient's :  Mariel Murry [[id number]]   , Low Transverse  Information for the patient's :  Danton Rachel 2 Tahmina Gonzalez [[id number]]   , Low Transverse     Patient doing well without significant complaint. Nausea and vomiting resolved, tolerating a diet, no flatus, berry removed and voiding. Normal lochia, ambulating, pumping milk. Vitals:  Visit Vitals  /84 (BP 1 Location: Right arm, BP Patient Position: Sitting)   Pulse (!) 42   Temp 98.5 °F (36.9 °C)   Resp 16   Ht 5' 7\" (1.702 m)   Wt 78.9 kg (174 lb)   SpO2 99%   Breastfeeding? Unknown   BMI 27.25 kg/m²     Temp (24hrs), Av °F (36.7 °C), Min:97.5 °F (36.4 °C), Max:99.4 °F (37.4 °C)      Last 24hr Input/Output:    Intake/Output Summary (Last 24 hours) at 9/3/2019 1107  Last data filed at 9/3/2019 0841  Gross per 24 hour   Intake 2515.83 ml   Output 8600 ml   Net -6084.17 ml          Exam:        Patient without distress. Lungs clear. Abdomen, bowel sounds present, soft, expected tenderness, fundus firm   Wound dressing intact     Perineum normal lochia noted               Lower extremities are negative for swelling, cords or tenderness.     Labs:     Recent Results (from the past 24 hour(s))   CBC WITH AUTOMATED DIFF    Collection Time: 19 12:08 PM   Result Value Ref Range    WBC 6.9 3.6 - 11.0 K/uL    RBC 4.13 3.80 - 5.20 M/uL    HGB 10.2 (L) 11.5 - 16.0 g/dL    HCT 32.9 (L) 35.0 - 47.0 %    MCV 79.7 (L) 80.0 - 99.0 FL    MCH 24.7 (L) 26.0 - 34.0 PG    MCHC 31.0 30.0 - 36.5 g/dL    RDW 15.8 (H) 11.5 - 14.5 %    PLATELET 546 227 - 719 K/uL    MPV 11.6 8.9 - 12.9 FL    NRBC 0.0 0  WBC    ABSOLUTE NRBC 0.00 0.00 - 0.01 K/uL    NEUTROPHILS 63 32 - 75 %    LYMPHOCYTES 28 12 - 49 %    MONOCYTES 8 5 - 13 %    EOSINOPHILS 0 0 - 7 %    BASOPHILS 0 0 - 1 %    IMMATURE GRANULOCYTES 1 (H) 0.0 - 0.5 %    ABS. NEUTROPHILS 4.3 1.8 - 8.0 K/UL    ABS. LYMPHOCYTES 2.0 0.8 - 3.5 K/UL    ABS. MONOCYTES 0.6 0.0 - 1.0 K/UL    ABS. EOSINOPHILS 0.0 0.0 - 0.4 K/UL    ABS. BASOPHILS 0.0 0.0 - 0.1 K/UL    ABS. IMM. GRANS. 0.0 0.00 - 0.04 K/UL    DF AUTOMATED     TYPE & SCREEN    Collection Time: 09/02/19 12:08 PM   Result Value Ref Range    Crossmatch Expiration 09/05/2019     ABO/Rh(D) O NEGATIVE     Antibody screen POS     Antibody ID NO ADDITIONAL ANTIBODIES DETECTED     Comment Previously identified anti D passively acquired      Unit number [id number]     Blood component type  LR     Unit division 00     Status of unit ALLOCATED     Crossmatch result Compatible     Unit number [id number]     Blood component type  LR,2     Unit division 00     Status of unit ALLOCATED     Crossmatch result Compatible    RH IMMUNE GLOBULIN EVAL-LAB ORDER    Collection Time: 09/03/19  3:12 AM   Result Value Ref Range    ABO/Rh(D) O NEGATIVE     Fetal screen NEG     WEAK D NEG     Unit number [id number]     Blood component type RH IMMUNE GLOBULIN     Unit division 00     Status of unit ALLOCATED    CBC WITH AUTOMATED DIFF    Collection Time: 09/03/19  3:12 AM   Result Value Ref Range    WBC 9.8 3.6 - 11.0 K/uL    RBC 4.25 3.80 - 5.20 M/uL    HGB 10.4 (L) 11.5 - 16.0 g/dL    HCT 34.6 (L) 35.0 - 47.0 %    MCV 81.4 80.0 - 99.0 FL    MCH 24.5 (L) 26.0 - 34.0 PG    MCHC 30.1 30.0 - 36.5 g/dL    RDW 15.9 (H) 11.5 - 14.5 %    PLATELET 772 276 - 825 K/uL    MPV 11.1 8.9 - 12.9 FL    NRBC 0.0 0  WBC    ABSOLUTE NRBC 0.00 0.00 - 0.01 K/uL    NEUTROPHILS 70 32 - 75 %    LYMPHOCYTES 21 12 - 49 %    MONOCYTES 9 5 - 13 %    EOSINOPHILS 0 0 - 7 %    BASOPHILS 0 0 - 1 %    IMMATURE GRANULOCYTES 0 0.0 - 0.5 %    ABS. NEUTROPHILS 6.8 1.8 - 8.0 K/UL    ABS.  LYMPHOCYTES 2.1 0.8 - 3.5 K/UL ABS. MONOCYTES 0.9 0.0 - 1.0 K/UL    ABS. EOSINOPHILS 0.0 0.0 - 0.4 K/UL    ABS. BASOPHILS 0.0 0.0 - 0.1 K/UL    ABS. IMM.  GRANS. 0.0 0.00 - 0.04 K/UL    DF AUTOMATED

## 2019-09-04 PROCEDURE — 74011250637 HC RX REV CODE- 250/637: Performed by: OBSTETRICS & GYNECOLOGY

## 2019-09-04 PROCEDURE — 65410000002 HC RM PRIVATE OB

## 2019-09-04 RX ORDER — FLUOXETINE HYDROCHLORIDE 20 MG/1
20 CAPSULE ORAL DAILY
Status: DISCONTINUED | OUTPATIENT
Start: 2019-09-04 | End: 2019-09-05 | Stop reason: HOSPADM

## 2019-09-04 RX ADMIN — IBUPROFEN 800 MG: 400 TABLET, FILM COATED ORAL at 04:34

## 2019-09-04 RX ADMIN — OXYCODONE AND ACETAMINOPHEN 2 TABLET: 5; 325 TABLET ORAL at 15:34

## 2019-09-04 RX ADMIN — FLUOXETINE 20 MG: 20 CAPSULE ORAL at 21:15

## 2019-09-04 RX ADMIN — OXYCODONE AND ACETAMINOPHEN 2 TABLET: 5; 325 TABLET ORAL at 21:18

## 2019-09-04 RX ADMIN — DOCUSATE SODIUM 100 MG: 100 CAPSULE, LIQUID FILLED ORAL at 08:27

## 2019-09-04 RX ADMIN — IBUPROFEN 800 MG: 400 TABLET, FILM COATED ORAL at 20:03

## 2019-09-04 RX ADMIN — DOCUSATE SODIUM 100 MG: 100 CAPSULE, LIQUID FILLED ORAL at 19:05

## 2019-09-04 RX ADMIN — OXYCODONE AND ACETAMINOPHEN 2 TABLET: 5; 325 TABLET ORAL at 11:10

## 2019-09-04 RX ADMIN — OXYCODONE AND ACETAMINOPHEN 2 TABLET: 5; 325 TABLET ORAL at 04:34

## 2019-09-04 RX ADMIN — SIMETHICONE 80 MG: 80 TABLET, CHEWABLE ORAL at 11:11

## 2019-09-04 RX ADMIN — IBUPROFEN 800 MG: 400 TABLET, FILM COATED ORAL at 12:14

## 2019-09-04 NOTE — ROUTINE PROCESS
2314  Bedside and Verbal shift change report given to Charito RN (oncoming nurse) by EILEEN Stanley RN (offgoing nurse). Report included the following information SBAR, Intake/Output, MAR and Recent Results.

## 2019-09-04 NOTE — PROGRESS NOTES
Post-Operative  Day 2    Pascale Charlton     Assessment: Post-Op day 2 RLTCS p PTL of di/di twins at 35 weeks, doing well    Plan:   1. Routine post-operative care    Information for the patient's :  Dominguez Mckenzie [[id number]]   , Low Transverse  Information for the patient's :  Nisreen Aviles 2 Mervat Warren [[id number]]   , Low Transverse   Patient doing well without significant complaint. Nausea and vomiting resolved, tolerating liquids, passing flatus, voiding and ambulating without difficulty. Vitals:  Visit Vitals  /64 (BP 1 Location: Right arm, BP Patient Position: At rest)   Pulse (!) 48   Temp 98.7 °F (37.1 °C)   Resp 16   Ht 5' 7\" (1.702 m)   Wt 78.9 kg (174 lb)   SpO2 99%   Breastfeeding? Unknown   BMI 27.25 kg/m²     Temp (24hrs), Av.4 °F (36.9 °C), Min:97.8 °F (36.6 °C), Max:98.8 °F (37.1 °C)        Exam:        Patient without distress. Abdomen, bowel sounds present, soft, expected tenderness, fundus firm                Wound incision clean, dry and intact               Lower extremities are negative for swelling, cords or tenderness.     Labs:   Lab Results   Component Value Date/Time    WBC 9.8 2019 03:12 AM    WBC 6.9 2019 12:08 PM    WBC 4.3 2018 10:18 AM    WBC 9.9 2018 05:06 AM    WBC 11.9 (H) 2018 02:30 PM    WBC 9.3 2018 07:29 PM    WBC 14.5 (H) 2015 03:00 AM    WBC 10.4 2015 09:50 AM    WBC 8.5 08/10/2012 03:30 PM    WBC 10.8 2012 04:25 PM    HGB 10.4 (L) 2019 03:12 AM    HGB 10.2 (L) 2019 12:08 PM    HGB 10.7 (L) 2018 10:18 AM    HGB 8.8 (L) 2018 05:06 AM    HGB 10.0 (L) 2018 02:30 PM    HGB 8.5 (L) 2018 07:29 PM    HGB 10.6 (L) 2015 03:00 AM    HGB 12.3 2015 09:50 AM    HGB 11.7 08/10/2012 03:30 PM    HGB 10.6 (L) 2012 04:25 PM    HCT 34.6 (L) 2019 03:12 AM    HCT 32.9 (L) 2019 12:08 PM    HCT 36.6 03/30/2018 10:18 AM    HCT 29.4 (L) 03/13/2018 05:06 AM    HCT 32.0 (L) 03/12/2018 02:30 PM    HCT 27.2 (L) 02/27/2018 07:29 PM    HCT 32.9 (L) 03/26/2015 03:00 AM    HCT 37.7 03/24/2015 09:50 AM    HCT 36.1 08/10/2012 03:30 PM    HCT 32.1 (L) 07/20/2012 04:25 PM    PLATELET 897 09/61/3087 03:12 AM    PLATELET 804 56/56/5707 12:08 PM    PLATELET 361 07/67/0156 10:18 AM    PLATELET 621 95/64/8741 05:06 AM    PLATELET 855 98/96/7543 02:30 PM    PLATELET 678 27/59/0743 07:29 PM    PLATELET 326 89/40/5543 03:00 AM    PLATELET 917 11/01/8596 09:50 AM    PLATELET 061 22/14/0783 03:30 PM    PLATELET 317 69/03/0059 04:25 PM       No results found for this or any previous visit (from the past 24 hour(s)).

## 2019-09-04 NOTE — PROGRESS NOTES
1915 SBAR report from SAMANTHA Diaz RN.  2030  Assessment completed. Fundal check WNL's, VSS, pulse 52. Alert/oriented. Assisted mom with feedings from 5452-7527. Mom expresses she is extremely tired and has not slept much. Discussed resting from 2230-next feeding. Pt is tearful-- prayed with pt.

## 2019-09-04 NOTE — ROUTINE PROCESS
Bedside shift change report given to LINDA Kim (oncoming nurse) by INOCENTE Cortez RN (offgoing nurse). Report included the following information SBAR, Procedure Summary, Intake/Output, MAR and Recent Results.

## 2019-09-04 NOTE — PROGRESS NOTES
Bedside shift change report given to INOCENTE Cox RN (oncoming nurse) by Miracle Washington RN (offgoing nurse). Report included the following information SBAR.

## 2019-09-05 ENCOUNTER — HOSPITAL ENCOUNTER (OUTPATIENT)
Dept: INFUSION THERAPY | Age: 24
Discharge: HOME | End: 2019-09-05

## 2019-09-05 VITALS
BODY MASS INDEX: 27.31 KG/M2 | HEIGHT: 67 IN | TEMPERATURE: 98.8 F | RESPIRATION RATE: 17 BRPM | OXYGEN SATURATION: 99 % | HEART RATE: 57 BPM | WEIGHT: 174 LBS | SYSTOLIC BLOOD PRESSURE: 121 MMHG | DIASTOLIC BLOOD PRESSURE: 81 MMHG

## 2019-09-05 PROCEDURE — 90707 MMR VACCINE SC: CPT | Performed by: OBSTETRICS & GYNECOLOGY

## 2019-09-05 PROCEDURE — 74011250636 HC RX REV CODE- 250/636: Performed by: OBSTETRICS & GYNECOLOGY

## 2019-09-05 PROCEDURE — 74011250637 HC RX REV CODE- 250/637: Performed by: OBSTETRICS & GYNECOLOGY

## 2019-09-05 RX ORDER — OXYCODONE AND ACETAMINOPHEN 5; 325 MG/1; MG/1
1 TABLET ORAL
Qty: 20 TAB | Refills: 0 | Status: SHIPPED | OUTPATIENT
Start: 2019-09-05 | End: 2019-09-10

## 2019-09-05 RX ORDER — IBUPROFEN 800 MG/1
800 TABLET ORAL
Qty: 30 TAB | Refills: 1 | Status: SHIPPED | OUTPATIENT
Start: 2019-09-05 | End: 2020-01-09

## 2019-09-05 RX ADMIN — DOCUSATE SODIUM 100 MG: 100 CAPSULE, LIQUID FILLED ORAL at 08:40

## 2019-09-05 RX ADMIN — IBUPROFEN 800 MG: 400 TABLET, FILM COATED ORAL at 03:58

## 2019-09-05 RX ADMIN — OXYCODONE AND ACETAMINOPHEN 2 TABLET: 5; 325 TABLET ORAL at 11:40

## 2019-09-05 RX ADMIN — OXYCODONE AND ACETAMINOPHEN 2 TABLET: 5; 325 TABLET ORAL at 06:54

## 2019-09-05 RX ADMIN — MEASLES, MUMPS, AND RUBELLA VIRUS VACCINE LIVE 0.5 ML: 1000; 12500; 1000 INJECTION, POWDER, LYOPHILIZED, FOR SUSPENSION SUBCUTANEOUS at 11:04

## 2019-09-05 RX ADMIN — HUMAN RHO(D) IMMUNE GLOBULIN 0.3 MG: 300 INJECTION, SOLUTION INTRAMUSCULAR at 08:17

## 2019-09-05 RX ADMIN — IBUPROFEN 800 MG: 400 TABLET, FILM COATED ORAL at 11:40

## 2019-09-05 NOTE — PROGRESS NOTES
Bedside and Verbal shift change report given to INOCENTE Ward RN (oncoming nurse) by MYESHA Byers (offgoing nurse). Report included the following information SBAR, Kardex, Procedure Summary, Intake/Output, MAR and Recent Results.

## 2019-09-05 NOTE — DISCHARGE INSTRUCTIONS
POSTPARTUM DISCHARGE INSTRUCTIONS       Name:  Deisree Prescott  YOB: 1995  Admission Diagnosis:  Labor and delivery, indication for care [O75.9]     Discharge Diagnosis:    Problem List as of 2019 Date Reviewed: 2019          Codes Class Noted - Resolved    Labor and delivery, indication for care ICD-10-CM: O75.9  ICD-9-CM: 659.90  2019 - Present        Twin to twin transfusion in third trimester ICD-10-CM: O43.023  ICD-9-CM: 678.03  3/12/2018 - Present        Adjustment disorder with depressed mood ICD-10-CM: F43.21  ICD-9-CM: 309.0  2017 - Present        Major depression ICD-10-CM: F32.9  ICD-9-CM: 296.20  9/15/2017 - Present        Pregnancy ICD-10-CM: Z34.90  ICD-9-CM: V22.2  3/25/2015 - Present        Retained placenta ICD-10-CM: O73.0  ICD-9-CM: 667.00  8/10/2012 - Present        IUGR (intrauterine growth restriction) ICD-10-CM: CXM8524  ICD-9-CM: 764.90  2012 - Present        Rh negative status during pregnancy, antepartum ICD-10-CM: O26.899, Z67.91  ICD-9-CM: 646.83  2012 - Present            Attending Physician:  Daphnie Koroma MD    Delivery Type:   Section: What to Expect at Home    Your Recovery:  A  section, or , is surgery to deliver your baby through a cut, called an incision that the doctor makes in your lower belly and uterus. You may have some pain in your lower belly and need pain medicine for 1 to 2 weeks. You can expect some vaginal bleeding for several weeks. You will probably need about 6 weeks to fully recover. It is important to take it easy while the incision is healing. Avoid heavy lifting, strenuous activities, or exercises that strain the belly muscles while you are recovering. Ask a family member or friend for help with housework, cooking, and shopping. This care sheet gives you a general idea about how long it will take for you to recover. But each person recovers at a different pace.  Follow the steps below to get better as quickly as possible. How can you care for yourself at home? Activity  · Rest when you feel tired. Getting enough sleep will help you recover. · Try to walk each day. Start by walking a little more than you did the day before. Bit by bit, increase the amount you walk. Walking boosts blood flow and helps prevent pneumonia, constipation, and blood clots. · Avoid strenuous activities, such as bicycle riding, jogging, weightlifting, and aerobic exercise, for 6 weeks or until your doctor says it is okay. · Until your doctor says it is okay, do not lift anything heavier than your baby. · Do not do sit-ups or other exercise that strain the belly muscles for 6 weeks or until your doctor says it is okay. · Hold a pillow over your incision when you cough or take deep breaths. This will support your belly and decrease your pain. · You may shower as usual. Pat the incision dry when you are done. · You will have some vaginal bleeding. Wear sanitary pads. Do not douche or use tampons until your doctor says it is okay. · Ask your doctor when you can drive again. · You will probably need to take at least 6 weeks off work. It depends on the type of work you do and how you feel. · Wait until you are healed (about 4 to 6 weeks) before you have sexual intercourse. Your doctor will tell you when it is okay to have sex. · Talk to your doctor about birth control. You can get pregnant even before your period returns. Also, you can get pregnant while you are breast-feeding. Incision care  Your skin is your body's first line of defense against germs, but an incision site leaves an easy way for germs to enter your body. To prevent infection:  · Clean your hands frequently and before and after changing any touching any dressings. · If you have strips of tape on the incision, leave the tape on for a week or until it falls off. · Look at your incision closely every day for any changes.  Contact your doctor if you experience any signs of infection, such as fever or increased redness at the surgical site. · Wash the area daily with warm, soapy water, and pat it dry. Don't use hydrogen peroxide or alcohol, which can slow healing. You may cover the area with a gauze bandage if it weeps or rubs against clothing. Change the bandage every day. · Keep the area clean and dry. Diet  · You can eat your normal diet. If your stomach is upset, try bland, low-fat foods like plain rice, broiled chicken, toast, and yogurt. · Drink plenty of fluids (unless your doctor tells you not to). · You may notice that your bowel movements are not regular right after your surgery. This is common. Try to avoid constipation and straining with bowel movements. You may want to take a fiber supplement every day. If you have not had a bowel movement after a couple of days, ask your doctor about taking a mild laxative. · If you are breast-feeding, do not drink any alcohol. Medicines  · Take pain medicines exactly as directed. · If the doctor gave you a prescription medicine for pain, take it as prescribed. · If you are not taking a prescription pain medicine, ask your doctor if you can take an over-the-counter medicine such as acetaminophen (Tylenol), ibuprofen (Advil, Motrin), or naproxen (Aleve), for cramps. Read and follow all instructions on the label. Do not take aspirin, because it can cause more bleeding. Do not take acetaminophen (Tylenol) and other acetaminophen containing medications (i.e. Percocet) at the same time. · If you think your pain medicine is making you sick to your stomach:  · Take your medicine after meals (unless your doctor has told you not to). · Ask your doctor for a different pain medicine. · If your doctor prescribed antibiotics, take them as directed. Do not stop taking them just because you feel better. You need to take the full course of antibiotics.     Mental Health  · Many women get the \"baby blues\" during the first few days after childbirth. You may lose sleep, feel irritable, and cry easily. You may feel happy one minute and sad the next. Hormone changes are one cause of these emotional changes. Also, the demands of a new baby, along with visits from relatives or other family needs, add to a mother's stress. The \"baby blues\" often peak around the fourth day. Then they ease up in less than 2 weeks. · If your moodiness or anxiety lasts for more than 2 weeks, or if you feel like life is not worth living, you may have postpartum depression. This is different for each mother. Some mothers with serious depression may worry intensely about their infant's well-being. Others may feel distant from their child. Some mothers might even feel that they might harm their baby. A mother may have signs of paranoia, wondering if someone is watching her. · With all the changes in your life, you may not know if you are depressed. Pregnancy sometimes causes changes in how you feel that are similar to the symptoms of depression. · Symptoms of depression include:  · Feeling sad or hopeless and losing interest in daily activities. These are the most common symptoms of depression. · Sleeping too much or not enough. · Feeling tired. You may feel as if you have no energy. · Eating too much or too little. · POSTPARTUM SUPPORT INTERNATIONAL (PSI) offers a Warm line; Chat with the Expert phone sessions; Information and Articles about Pregnancy and Postpartum Mood Disorders; Comprehensive List of Free Support Groups; Knowledgeable local coordinators who will offer support, information, and resources; Guide to Resources on LIFEmee; Calendar of events in the  mood disorders community; Latest News and Research; and [facility] [address] for [employer]. Remember - You are not alone; You are not to blame; With help, you will be well. 2-042-091-PPD(3190). WWW. POSTPARTUM. NET   · Writing or talking about death, such as writing suicide notes or talking about guns, knives, or pills. Keep the numbers for these national suicide hotlines: 1-651-097-TALK (1-819.377.4267) and 6-534-HQEYSGR (9-654.189.3262). If you or someone you know talks about suicide or feeling hopeless, get help right away. Other instructions  · If you breast-feed your baby, you may be more comfortable while you are healing if you place the baby so that he or she is not resting on your belly. Try tucking your baby under your arm, with his or her body along the side you will be feeding on. Support your baby's upper body with your arm. With that hand you can control your baby's head to bring his or her mouth to your breast. This is sometimes called the football hold. Follow-up care is a key part of your treatment and safety. Be sure to make and go to all appointments, and call your doctor if you are having problems. It's also a good idea to know your test results and keep a list of the medicines you take. When should you call for help? Call 911 anytime you think you may need emergency care. For example, call if:  · You are thinking of hurting yourself, your baby, or anyone else. · You passed out (lost consciousness). · You have symptoms of a blood clot in your lung (called a pulmonary embolism). These may include:  · Sudden chest pain. · Trouble breathing. · Coughing up blood. Call your doctor now or seek immediate medical care if:    · You have severe vaginal bleeding. · You are soaking through a pad each hour for 2 or more hours. · Your vaginal bleeding seems to be getting heavier or is still bright red 4 days after delivery. · You are dizzy or lightheaded, or you feel like you may faint. · You are vomiting or cannot keep fluids down. · You have a fever. · You have new or more belly pain. · You have loose stitches, or your incision comes open.     · You have symptoms of infection, such as:  · Increased pain, swelling, warmth, or redness. · Red streaks leading from the incision. · Pus draining from the incision. · A fever  · You pass tissue (not just blood). · Your vaginal discharge smells bad. · Your belly feels tender or full and hard. · Your breasts are continuously painful or red. · You feel sad, anxious, or hopeless for more than a few days. · You have sudden, severe pain in your belly. · You have symptoms of a blood clot in your leg (called a deep vein thrombosis), such as:  · Pain in your calf, back of the knee, thigh, or groin. · Redness and swelling in your leg or groin. · You have symptoms of preeclampsia, such as:  · Sudden swelling of your face, hands, or feet. · New vision problems (such as dimness or blurring). · A severe headache. · Your blood pressure is higher than it should be or rises suddenly. · You have new nausea or vomiting. Watch closely for changes in your health, and be sure to contact your doctor if you have any problems. Additional Information:  Preventing Infection at Home    We care about preventing infection and avoiding the spread of germs - not only when you are in the hospital but also when you return home. When you return home from the hospital, it's important to take the following steps to help prevent infection and avoid spreading germs that could infect you and others. Ask everyone in your home to follow these guidelines, too. Clean Your Hands  · Clean your hands whenever your hands are visibly dirty, before you eat, before or after touching your mouth, nose or eyes, and before preparing food. Clean them after contact with body fluids, using the restroom, touching animals or changing diapers. · When washing hands, wet them with warm water and work up a lather. Rub hands for at least 15 seconds, then rinse them and pat them dry with a clean towel or paper towel. · When using hand sanitizers, it should take about 15 seconds to rub your hands dry.  If not, you probably didn't apply enough . Cover Your Sneeze or Cough  Germs are released into the air whenever you sneeze or cough. To prevent the spread of infection:  · Turn away from other people before coughing or sneezing. · Cover your mouth or nose with a tissue when you cough or sneeze. Put the tissue in the trash. · If you don't have a tissue, cough or sneeze into your upper sleeve, not your hands. · Always clean your hands after coughing or sneezing. Care for Wounds  Your skin is your body's first line of defense against germs, but an open wound leaves an easy way for germs to enter your body. To prevent infection:  · Clean your hands before and after changing wound dressings, and wear gloves to change dressings if recommended by your doctor. · Take special care with IV lines or other devices inserted into the body. If you must touch them, clean your hands first.  · Follow any specific instructions from your doctor to care for your wounds. Contact your doctor if you experience any signs of infection, such as fever or increased redness at the surgical or wound site. Keep a Clean Home  · Clean or wipe commonly touched hard surfaces like door handles, sinks, tabletops, phones and TV remotes. · Use products labeled \"disinfectant\" to kill harmful bacteria and viruses. · Use a clean cloth or paper towel to clean and dry surfaces. Wiping surfaces with a dirty dishcloth, sponge or towel will only spread germs. · Never share toothbrushes, nj, drinking glasses, utensils, razor blades, face cloths or bath towels to avoid spreading germs. · Be sure that the linens that you sleep on are clean. · Keep pets away from wounds and wash your hands after touching pets, their toys or bedding. We care about you and your health. Remember, preventing infections is a   team effort between you, your family, friends and health care providers. Postpartum Support    PARENTS:  Are you feeling sad or depressed?  Is it difficult for you to enjoy yourself? Do you feel more irritable or tense? Do you feel anxious or panicky? Are you having difficulty bonding with your baby? Do you feel as if you are \"out of control\" or \"going crazy\"? Are you worried that you might hurt your baby or yourself? FAMILIES: Do you worry that something is wrong but don't know how to help? Do you think that your partner or spouse is having problems coping? Are you worried that it may never get better? While many women experience some mild mood change or \"the blues\" during or after the birth of a child, 1 in 9 women experience more significant symptoms of depression or anxiety. 1 in 10 Dads become depressed during the first year. Things you can do  Being a good parent includes taking care of yourself. If you take care of yourself, you will be able to take better care of your baby and your family. · Talk to a counselor or healthcare provider who has training in  mood and anxiety problems. · Learn as much as you can about pregnancy and postpartum depression and anxiety. · Get support from family and friends. Ask for help when you need it. · Join a support group in your area or online. · Keep active by walking, stretching or whatever form of exercise helps you to feel better. · Get enough rest and time for yourself. · Eat a healthy diet. · Don't give up! It may take more than one try to get the right help you need. These are general instructions for a healthy lifestyle:    No smoking/ No tobacco products/ Avoid exposure to second hand smoke    Surgeon General's Warning:  Quitting smoking now greatly reduces serious risk to your health.     Obesity, smoking, and sedentary lifestyle greatly increases your risk for illness    A healthy diet, regular physical exercise & weight monitoring are important for maintaining a healthy lifestyle    Recognize signs and symptoms of STROKE:    F-face looks uneven    A-arms unable to move or move unevenly    S-speech slurred or non-existent    T-time-call 911 as soon as signs and symptoms begin - DO NOT go       back to bed or wait to see if you get better - TIME IS BRAIN. I have had the opportunity to make my options or choices for discharge. I have received and understand these instructions. [employer] Activation    Thank you for requesting access to [employer]. Please follow the instructions below to securely access and download your online medical record. [employer] allows you to send messages to your doctor, view your test results, renew your prescriptions, schedule appointments, and more. How Do I Sign Up? 1. In your internet browser, go to [email or web address] In [email or web address]. 2. Click on the First Time User? Click Here link in the Sign In box. You will see the New Member Sign Up page. 3. Enter your [employer] Access Code exactly as it appears below. You will not need to use this code after youve completed the sign-up process. If you do not sign up before the expiration date, you must request a new code. [employer] Access Code: [email or web address]  Expires: 10/3/2019  2:04 PM (This is the date your [employer] access code will )    4. Enter the last four digits of your Social Security Number (xxxx) and Date of Birth (mm/dd/yyyy) as indicated and click Submit. You will be taken to the next sign-up page. 5. Create a [facility] ID. This will be your [facility] login ID and cannot be changed, so think of one that is secure and easy to remember. 6. Create a [employer] password. You can change your password at any time. 7. Enter your Password Reset Question and Answer. This can be used at a later time if you forget your password. 8. Enter your e-mail address. You will receive e-mail notification when new information is available in [address] 9. Click Sign Up. You can now view and download portions of your medical record.   10. Click the Download Summary menu link to download a portable copy of your medical information. Additional Information    If you have questions, please visit the Frequently Asked Questions section of the [employer] website at [email or web address] Remember, [employer] is NOT to be used for urgent needs. For medical emergencies, dial 911.

## 2019-09-05 NOTE — PROGRESS NOTES
Post-Operative  Day 3    Pascale Charlton       Assessment: Post-Op day 3, doing well    Plan:   1. Discharge home today  2. Follow up in office in 6 weeks with Lord Essie MD  3. Post partum activity/wound care advised, diet as tolerated  4. Discharge Medications: ibuprofen, percocet and medications prior to admission      Information for the patient's :  Racquel Hilton [[id number]]   , Low Transverse  Information for the patient's :  Nancy Ellsworth [[id number]]   , Low Transverse   Patient doing well without significant complaint. Tolerating diet, passing flatus, voiding and ambulating without difficulty    Vitals:  Visit Vitals  /81 (BP 1 Location: Right arm, BP Patient Position: At rest)   Pulse (!) 57   Temp 98.8 °F (37.1 °C)   Resp 17   Ht 5' 7\" (1.702 m)   Wt 78.9 kg (174 lb)   SpO2 99%   Breastfeeding? Unknown   BMI 27.25 kg/m²     Temp (24hrs), Av.4 °F (36.9 °C), Min:98 °F (36.7 °C), Max:98.8 °F (37.1 °C)        Exam:        Patient without distress. Abdomen, bowel sounds present, soft, expected tenderness, fundus firm                Wound incision clean, dry and intact               Lower extremities are negative for swelling, cords or tenderness.     Labs:   Lab Results   Component Value Date/Time    WBC 9.8 2019 03:12 AM    WBC 6.9 2019 12:08 PM    WBC 4.3 2018 10:18 AM    WBC 9.9 2018 05:06 AM    WBC 11.9 (H) 2018 02:30 PM    WBC 9.3 2018 07:29 PM    WBC 14.5 (H) 2015 03:00 AM    WBC 10.4 2015 09:50 AM    WBC 8.5 08/10/2012 03:30 PM    WBC 10.8 2012 04:25 PM    HGB 10.4 (L) 2019 03:12 AM    HGB 10.2 (L) 2019 12:08 PM    HGB 10.7 (L) 2018 10:18 AM    HGB 8.8 (L) 2018 05:06 AM    HGB 10.0 (L) 2018 02:30 PM    HGB 8.5 (L) 2018 07:29 PM    HGB 10.6 (L) 2015 03:00 AM    HGB 12.3 2015 09:50 AM    HGB 11.7 08/10/2012 03:30 PM    HGB 10.6 (L) 07/20/2012 04:25 PM    HCT 34.6 (L) 09/03/2019 03:12 AM    HCT 32.9 (L) 09/02/2019 12:08 PM    HCT 36.6 03/30/2018 10:18 AM    HCT 29.4 (L) 03/13/2018 05:06 AM    HCT 32.0 (L) 03/12/2018 02:30 PM    HCT 27.2 (L) 02/27/2018 07:29 PM    HCT 32.9 (L) 03/26/2015 03:00 AM    HCT 37.7 03/24/2015 09:50 AM    HCT 36.1 08/10/2012 03:30 PM    HCT 32.1 (L) 07/20/2012 04:25 PM    PLATELET 237 08/49/9273 03:12 AM    PLATELET 513 49/44/2981 12:08 PM    PLATELET 557 32/55/3678 10:18 AM    PLATELET 551 31/38/5899 05:06 AM    PLATELET 029 11/45/0789 02:30 PM    PLATELET 804 43/13/2293 07:29 PM    PLATELET 739 74/21/7963 03:00 AM    PLATELET 223 33/37/1002 09:50 AM    PLATELET 080 99/24/2529 03:30 PM    PLATELET 275 27/88/7512 04:25 PM       No results found for this or any previous visit (from the past 24 hour(s)).

## 2019-09-05 NOTE — DISCHARGE SUMMARY
Obstetrical Discharge Summary     Name: Olivia Blackburn MRN: [id number]  SSN: [id number]    YOB: 1995  Age: 30 y.o. Sex: female      Admit Date: 2019    Discharge Date: 2019     Admitting Physician: Annelise Bridges MD     Attending Physician:  Tru Carter MD     Admission Diagnoses: Labor and delivery, indication for care [O75.9]    Discharge Diagnoses:   Information for the patient's :  Rika Cortez [[id number]]   Delivery of a 2.66 kg female infant via , Low Transverse on 2019 at 1:28 PM  by Ardie Landau. Apgars were 8  and 8 . Information for the patient's :  Lu Meehan [[id number]]   Delivery of a 2.585 kg female infant via , Low Transverse on 2019 at 1:29 PM  by Ardie Landau. Apgars were 8  and 8 . Additional Diagnoses:   Hospital Problems  Date Reviewed: 2019          Codes Class Noted POA    Labor and delivery, indication for care ICD-10-CM: O75.9  ICD-9-CM: 659.90  2019 Unknown             Lab Results   Component Value Date/Time    Rubella, External non immune 2019    GrBStrep, External negative 2018       Hospital Course: Normal hospital course following the delivery. Disposition at Discharge: Home or self care    Discharged Condition: Stable    Patient Instructions:   Current Discharge Medication List      START taking these medications    Details   oxyCODONE-acetaminophen (PERCOCET) 5-325 mg per tablet Take 1 Tab by mouth every six (6) hours as needed for Pain for up to 5 days. Max Daily Amount: 4 Tabs. Qty: 20 Tab, Refills: 0    Associated Diagnoses: Pregnancy, unspecified gestational age      ibuprofen (MOTRIN) 800 mg tablet Take 1 Tab by mouth every eight (8) hours as needed for Pain. Qty: 30 Tab, Refills: 1         CONTINUE these medications which have NOT CHANGED    Details   fluoxetine HCl (FLUOXETINE PO) Take  by mouth.       PRENATAL VITS62/FA/OM3/DHA/EPA (PRENATAL GUMMY PO) Take 2 Tabs by mouth daily. STOP taking these medications       ACETAMINOPHEN-CAFFEINE PO Comments:   Reason for Stopping:               Reference my discharge instructions.     Follow-up Appointments   Procedures    FOLLOW UP VISIT Appointment in: 6 Weeks     Standing Status:   Standing     Number of Occurrences:   1     Order Specific Question:   Appointment in     Answer:   6 Weeks        Signed By:  Mccoy Angelucci, MD     September 5, 2019

## 2019-09-06 LAB
ABO + RH BLD: NORMAL
ABO + RH BLD: NORMAL
BLD PROD TYP BPU: NORMAL
BLOOD BANK CMNT PATIENT-IMP: NORMAL
BLOOD GROUP ANTIBODIES SERPL: NORMAL
BLOOD GROUP ANTIBODIES SERPL: NORMAL
BPU ID: NORMAL
CROSSMATCH RESULT,%XM: NORMAL
CROSSMATCH RESULT,%XM: NORMAL
FETAL SCREEN,FMHS: NORMAL
SPECIMEN EXP DATE BLD: NORMAL
STATUS OF UNIT,%ST: NORMAL
UNIT DIVISION, %UDIV: 0
WEAK D AG RBC QL: NORMAL

## 2019-09-12 ENCOUNTER — APPOINTMENT (OUTPATIENT)
Dept: INFUSION THERAPY | Age: 24
End: 2019-09-12

## 2019-09-14 ENCOUNTER — HOSPITAL ENCOUNTER (OUTPATIENT)
Dept: GENERAL RADIOLOGY | Age: 24
Discharge: HOME | End: 2019-09-14
Payer: MEDICAID

## 2019-09-14 DIAGNOSIS — G89.18 OTHER ACUTE POSTPROCEDURAL PAIN: ICD-10-CM

## 2019-09-14 PROCEDURE — 71046 X-RAY EXAM CHEST 2 VIEWS: CPT

## 2020-01-01 NOTE — ANESTHESIA PROCEDURE NOTES
Spinal Block    Performed by: Homer Dumont MD  Authorized by: Homer Dumont MD                 Spinal Procedure Note    Risk and benefits were discussed with the patient and plans are to proceed. Patient was placed in the seated position. The back was prepped at the lumbar region with Duraprep. 3 mL 1% lidocaine was used as local.    Epidural space identified  @ L3 - L4    A 25 g pencil point spinal needle was placed  and advanced until CSF was obtained. 1.4 mL 0.75% Spinal Marcaine with dextrose + 0.5 mg Duramorph was deposited into the CSF. - paresthesia.
1. I was told the name of the doctor(s) who took care of my child while in the hospital.    2. I have been told about any important findings on my child's plan of care.    3. The doctor clearly explained my child's diagnosis and other possible diagnoses that were considered.    4. My child's doctor explained all the tests that were done and their results (if available). I understand that some of the test results may not be ready before we go home and I was told how I can get these results. I understand that a summary of my child's hospitalization and important test results will be shared with my child's outpatient doctor.    5. My child's doctor talked to me about what I need to do when we go home.    6. I understand what signs and symptoms to watch for. I understand what symptoms I would need to call my doctor for and/or return to the hospital.    7. I have the phone number to call the hospital for results and/or questions after I leave the hospital.

## 2020-01-09 ENCOUNTER — HOSPITAL ENCOUNTER (OUTPATIENT)
Dept: PREADMISSION TESTING | Age: 25
Discharge: HOME | End: 2020-01-09

## 2020-01-09 NOTE — H&P
Preoperative Evaluation                     History and Physical with Surgical Risk Stratification     1/9/2020    CC: Left knee pain  Surgery: Left knee scope     HPI:   Sandra Velazquez is a 30 y.o. female referred for pre-operative evaluation by Dr. Aj Augustin for surgery on 1/16/20. Mrs. Nikko Weathers had a previous knee scope in 2018 and did well until she got pregnant again causing an increase in pain. She is a mom to 6 girls, two sets of twins. The youngest set is 1 months old. She notes she had over 200 loose bodies removed from her knee last time and it feels the same. 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 Her knee will lock when she stands and feels unstable. The patient was evaluated in the surgeon's office and it was determined that the most appropriate plan of care is to proceed with surgical intervention. Patient's PCP Josefa Noble MD    Review of Systems     Constitutional: Negative for chills and fever  HENT: Negative for congestion and sore throat  Eyes: negative for blurred vision and double vision  Respiratory: Negative for cough, shortness of breath and wheezing  Mouth: Negative for loose, broken or chipped teeth. Cardiovascular: Negative for chest pain and palpitations  Gastrointestinal: Negative for abdominal pain, constipation, diarrhea and nausea  Genitourinary: Negative for dysuria and hematuria  Musculoskeletal: Positive for left knee pain  Skin: Negative for rash, open wounds. Negative for bruises easily  Neurological: Negative for dizziness, tremors and headaches  Psychiatric: Negative for depression. The patient is not nervous/anxious.     Inherent Risk of Surgery     Surgical risk:  Low  Low:  EArthroscopyIntermediate:   High:    Patient Cardiac Risk Assessment     Revised Cardiac Risk Index (RCRI)    Rate if cardiac death, nonfatal MI, nonfatal cardiac arrest by number of risk factor- 0.4%    PARAS/AHA 2007 Guidelines:   1) Surgery Emergency, Non-cardiac -> to surgery  2) If not, look at clinical predictors    Major Intermediate Minor       Blood Thinner: None    METS     >4 METS Climb a flight of stairs or a hill Walk on level ground at 4 mph Run a short distance Heavy work around house (scrub floors, move furniture)     Other Risk Factors:   Screening for ETOH use:  Done and low risk  Smoking status:  Former    Personal or FH of bleeding problems:  No  Personal or FH of blood clots:  No  Personal or FH of anesthesia problems:   No    Pulmonary Risk:  Asthma or COPD:  No  Known LORRAINE:  No      Past Medical, Surgical, Social History     Allergies: No Known Allergies    Patient did bring in medication list/bottles to review. Medication Documentation Review Audit     Reviewed by Teddy Jones RN (Registered Nurse) on 20 at [phone]    Medication Sig Documenting Provider Last Dose Status Taking?   fluoxetine HCl (FLUOXETINE PO) Take 20 mg by mouth daily. Provider, Historical  Active Yes   PRENATAL VITS62/FA/OM3/DHA/EPA (PRENATAL GUMMY PO) Take 2 Tabs by mouth daily.  Tylor Hammonds MD  Active Yes              Past Medical History:   Diagnosis Date    Anxiety     Bronchitis 12/23/15    [facility] [place] [facility] note    Diarrhea due to drug 2019    [facility] implant    Rh negative status during pregnancy, antepartum 2012     Past Surgical History:   Procedure Laterality Date     DELIVERY ONLY      ,     HX  SECTION      x 4    HX MENISCUS REPAIR Left 2018    HX WISDOM TEETH EXTRACTION       Social History     Tobacco Use    Smoking status: Former Smoker     Packs/day: 0.50     Years: 6.00     Pack years: 3.00     Types: Cigarettes     Last attempt to quit: 2017     Years since quittin.2    Smokeless tobacco: Never Used   Substance Use Topics    Alcohol use: No    Drug use: No     Family History   Problem Relation Age of Onset    Hypertension Mother     No Known Problems Father     Deep Vein Thrombosis Neg Hx     Malignant Hyperthermia Neg Hx        Objective     Constitutional:  Appears well,  No Acute Distress, Vitals noted  Psychiatric:   Affect normal, Alert and Oriented to person/place/time    Eyes:   Pupils equally round and reactive, EOMI, conjunctiva clear, eyelids normal  ENT:   External ears and nose normal/lips, teeth normal, gums normal, TMs and Orophyarynx normal  Neck:   General inspection and Thyroid normal.  No abnormal cervical or supraclavicular nodes    Lungs:   Clear to auscultation, good respiratory effort  Heart: Ausculation normal.  Regular rhythm. No cardiac murmurs. No carotid bruits or palpable thrills  Chest wall normal  Musculoskeletal: Normal gait  Extremities:   Without edema, good peripheral pulses  Skin:   Warm to palpation, without rashes, bruising, or suspicious lesions       Assessment and Plan     Assessment/Plan:   1) Left knee pain  2) Pre-Operative Evaluation    Preoperative Clearance  Per RCRI, the patient has a 0.4% risk of cardiac death, nonfatal MI, nonfatal cardiac arrest based on no risk factors. Per ACC/AHA guidelines, patient is low risk for a(n) low risk surgery and may proceed to planned surgery with the above noted risk.     Roxy Watson NP

## 2020-01-09 NOTE — PERIOP NOTES
[address] [facility] [address], [address] [facility]   MAIN OR                                  [phone]   MAIN PRE OP                          [phone]                                                                                AMBULATORY PRE OP          [phone]  PRE-ADMISSION TESTING    [phone]   Surgery Date:   1/16/20         Is surgery arrival time given by surgeon? NO  If NO, [address] staff will call you between 3 and 7pm the day before your surgery with your arrival time. (If your surgery is on a Monday, we will call you the Friday before.)    Call [phone] after 7pm Monday-Friday if you did not receive this call. INSTRUCTIONS BEFORE YOUR SURGERY   When You  Arrive Arrive at the [address] [facility] on the day of your surgery  Have your insurance card, photo ID, and any copayment (if needed)   Food   and   Drink NO food or drink after midnight the night before surgery    This means NO water, gum, mints, coffee, juice, etc.  No alcohol (beer, wine, liquor) 24 hours before and after surgery   Medications to   TAKE   Morning of Surgery MEDICATIONS TO TAKE THE MORNING OF SURGERY WITH A SIP OF WATER:    Prozac   Medications  To  STOP      7 days before surgery  Non-Steroidal anti-inflammatory Drugs (NSAID's): for example, Ibuprofen (Advil, Motrin), Naproxen (Aleve)   Aspirin, if taking for pain    Herbal supplements, vitamins, and fish oil   Other:  (Pain medications not listed above, including Tylenol may be taken)   Blood  Thinners  If you take  Aspirin, Plavix, Coumadin, or any blood-thinning or anti-blood clot medicine, talk to the doctor who prescribed the medications for pre-operative instructions.    Bathing Clothing  Jewelry  Valuables      If you shower the morning of surgery, please do not apply anything to your skin (lotions, powders, deodorant, or makeup, especially mascara)   Follow Chlorhexidine [employer] body wash instructions provided to you during PAT appointment. Begin 3 days prior to surgery.  Do not shave or trim anywhere 24 hours before surgery   Wear your hair loose or down; no pony-tails, buns, or metal hair clips   Wear loose, comfortable, clean clothes   Wear glasses instead of contacts   Leave money, valuables, and jewelry, including body piercings, at home   Going Home - or Spending the Night  SAME-DAY SURGERY: You must have a responsible adult drive you home and stay with you 24 hours after surgery   ADMITS: If your doctor is keeping you in the hospital after surgery, leave personal belongings/luggage in your car until you have a hospital room number. Hospital discharge time is 12 noon  Drivers must be here before 12 noon unless you are told differently   Special Instructions Free  parking,Bring completed PTA medication list with you on the day of your surgery. Follow all instructions so your surgery wont be cancelled. Please, be on time. If a situation occurs and you are delayed the day of surgery, call  [phone]. If your physical condition changes (like a fever, cold, flu, etc.) call your surgeon. Home medication(s) reviewed and verified  bottle during PAT appointment. The patient was contacted  in person. The patient verbalizes understanding of all instructions and does not  need reinforcement.   Free  parking

## 2020-01-13 VITALS — BODY MASS INDEX: 24.11 KG/M2 | HEIGHT: 66 IN | WEIGHT: 150 LBS

## 2020-01-15 ENCOUNTER — ANESTHESIA EVENT (OUTPATIENT)
Dept: SURGERY | Age: 25
End: 2020-01-15
Payer: MEDICAID

## 2020-01-16 ENCOUNTER — HOSPITAL ENCOUNTER (OUTPATIENT)
Age: 25
Setting detail: OUTPATIENT SURGERY
Discharge: HOME | End: 2020-01-16
Attending: ORTHOPAEDIC SURGERY | Admitting: ORTHOPAEDIC SURGERY
Payer: MEDICAID

## 2020-01-16 ENCOUNTER — ANESTHESIA (OUTPATIENT)
Dept: SURGERY | Age: 25
End: 2020-01-16
Payer: MEDICAID

## 2020-01-16 VITALS
RESPIRATION RATE: 14 BRPM | HEART RATE: 81 BPM | TEMPERATURE: 98.4 F | WEIGHT: 151.9 LBS | DIASTOLIC BLOOD PRESSURE: 84 MMHG | HEIGHT: 67 IN | BODY MASS INDEX: 23.84 KG/M2 | SYSTOLIC BLOOD PRESSURE: 127 MMHG | OXYGEN SATURATION: 97 %

## 2020-01-16 PROBLEM — M23.42: Status: ACTIVE | Noted: 2020-01-16

## 2020-01-16 PROBLEM — D48.0 SYNOVIAL CHONDROMATOSIS: Status: ACTIVE | Noted: 2020-01-16

## 2020-01-16 LAB — HCG UR QL: NEGATIVE

## 2020-01-16 PROCEDURE — 81025 URINE PREGNANCY TEST: CPT

## 2020-01-16 PROCEDURE — 77030020143 HC AIRWY LARYN INTUB CGAS -A: Performed by: NURSE ANESTHETIST, CERTIFIED REGISTERED

## 2020-01-16 PROCEDURE — 77030008495 HC TBNG ARTHSC IRR CNMD -B: Performed by: ORTHOPAEDIC SURGERY

## 2020-01-16 PROCEDURE — 88311 DECALCIFY TISSUE: CPT

## 2020-01-16 PROCEDURE — 76210000050 HC AMBSU PH II REC 0.5 TO 1 HR: Performed by: ORTHOPAEDIC SURGERY

## 2020-01-16 PROCEDURE — 77030018835 HC SOL IRR LR ICUM -A: Performed by: ORTHOPAEDIC SURGERY

## 2020-01-16 PROCEDURE — 88304 TISSUE EXAM BY PATHOLOGIST: CPT

## 2020-01-16 PROCEDURE — 77030000032 HC CUF TRNQT ZIMM -B: Performed by: ORTHOPAEDIC SURGERY

## 2020-01-16 PROCEDURE — 74011250636 HC RX REV CODE- 250/636: Performed by: ORTHOPAEDIC SURGERY

## 2020-01-16 PROCEDURE — 77030002916 HC SUT ETHLN J&J -A: Performed by: ORTHOPAEDIC SURGERY

## 2020-01-16 PROCEDURE — 74011000250 HC RX REV CODE- 250: Performed by: ORTHOPAEDIC SURGERY

## 2020-01-16 PROCEDURE — 76060000061 HC AMB SURG ANES 0.5 TO 1 HR: Performed by: ORTHOPAEDIC SURGERY

## 2020-01-16 PROCEDURE — 77030012893

## 2020-01-16 PROCEDURE — 74011250636 HC RX REV CODE- 250/636: Performed by: NURSE ANESTHETIST, CERTIFIED REGISTERED

## 2020-01-16 PROCEDURE — 76210000040 HC AMBSU PH I REC FIRST 0.5 HR: Performed by: ORTHOPAEDIC SURGERY

## 2020-01-16 PROCEDURE — 77030006877 HC BLD SHV FLL RAD S&N -B: Performed by: ORTHOPAEDIC SURGERY

## 2020-01-16 PROCEDURE — 76030000000 HC AMB SURG OR TIME 0.5 TO 1: Performed by: ORTHOPAEDIC SURGERY

## 2020-01-16 PROCEDURE — 74011000250 HC RX REV CODE- 250: Performed by: NURSE ANESTHETIST, CERTIFIED REGISTERED

## 2020-01-16 PROCEDURE — 74011250636 HC RX REV CODE- 250/636: Performed by: ANESTHESIOLOGY

## 2020-01-16 PROCEDURE — 77030040922 HC BLNKT HYPOTHRM STRY -A

## 2020-01-16 RX ORDER — KETOROLAC TROMETHAMINE 30 MG/ML
INJECTION, SOLUTION INTRAMUSCULAR; INTRAVENOUS AS NEEDED
Status: DISCONTINUED | OUTPATIENT
Start: 2020-01-16 | End: 2020-01-16 | Stop reason: HOSPADM

## 2020-01-16 RX ORDER — BUPIVACAINE HYDROCHLORIDE AND EPINEPHRINE 5; 5 MG/ML; UG/ML
INJECTION, SOLUTION EPIDURAL; INTRACAUDAL; PERINEURAL AS NEEDED
Status: DISCONTINUED | OUTPATIENT
Start: 2020-01-16 | End: 2020-01-16 | Stop reason: HOSPADM

## 2020-01-16 RX ORDER — SODIUM CHLORIDE, SODIUM LACTATE, POTASSIUM CHLORIDE, CALCIUM CHLORIDE 600; 310; 30; 20 MG/100ML; MG/100ML; MG/100ML; MG/100ML
125 INJECTION, SOLUTION INTRAVENOUS CONTINUOUS
Status: DISCONTINUED | OUTPATIENT
Start: 2020-01-16 | End: 2020-01-16 | Stop reason: HOSPADM

## 2020-01-16 RX ORDER — LIDOCAINE HYDROCHLORIDE 20 MG/ML
INJECTION, SOLUTION EPIDURAL; INFILTRATION; INTRACAUDAL; PERINEURAL AS NEEDED
Status: DISCONTINUED | OUTPATIENT
Start: 2020-01-16 | End: 2020-01-16 | Stop reason: HOSPADM

## 2020-01-16 RX ORDER — LIDOCAINE HYDROCHLORIDE AND EPINEPHRINE 10; 10 MG/ML; UG/ML
INJECTION, SOLUTION INFILTRATION; PERINEURAL AS NEEDED
Status: DISCONTINUED | OUTPATIENT
Start: 2020-01-16 | End: 2020-01-16 | Stop reason: HOSPADM

## 2020-01-16 RX ORDER — MIDAZOLAM HYDROCHLORIDE 1 MG/ML
INJECTION, SOLUTION INTRAMUSCULAR; INTRAVENOUS AS NEEDED
Status: DISCONTINUED | OUTPATIENT
Start: 2020-01-16 | End: 2020-01-16 | Stop reason: HOSPADM

## 2020-01-16 RX ORDER — FENTANYL CITRATE 50 UG/ML
INJECTION, SOLUTION INTRAMUSCULAR; INTRAVENOUS AS NEEDED
Status: DISCONTINUED | OUTPATIENT
Start: 2020-01-16 | End: 2020-01-16 | Stop reason: HOSPADM

## 2020-01-16 RX ORDER — NALOXONE HYDROCHLORIDE 0.4 MG/ML
0.2 INJECTION, SOLUTION INTRAMUSCULAR; INTRAVENOUS; SUBCUTANEOUS
Status: DISCONTINUED | OUTPATIENT
Start: 2020-01-16 | End: 2020-01-16 | Stop reason: HOSPADM

## 2020-01-16 RX ORDER — PROPOFOL 10 MG/ML
INJECTION, EMULSION INTRAVENOUS AS NEEDED
Status: DISCONTINUED | OUTPATIENT
Start: 2020-01-16 | End: 2020-01-16 | Stop reason: HOSPADM

## 2020-01-16 RX ORDER — HYDROMORPHONE HYDROCHLORIDE 1 MG/ML
.25-1 INJECTION, SOLUTION INTRAMUSCULAR; INTRAVENOUS; SUBCUTANEOUS
Status: DISCONTINUED | OUTPATIENT
Start: 2020-01-16 | End: 2020-01-16 | Stop reason: HOSPADM

## 2020-01-16 RX ORDER — ONDANSETRON 2 MG/ML
INJECTION INTRAMUSCULAR; INTRAVENOUS AS NEEDED
Status: DISCONTINUED | OUTPATIENT
Start: 2020-01-16 | End: 2020-01-16 | Stop reason: HOSPADM

## 2020-01-16 RX ORDER — LIDOCAINE HYDROCHLORIDE 10 MG/ML
0.1 INJECTION, SOLUTION EPIDURAL; INFILTRATION; INTRACAUDAL; PERINEURAL AS NEEDED
Status: DISCONTINUED | OUTPATIENT
Start: 2020-01-16 | End: 2020-01-16 | Stop reason: HOSPADM

## 2020-01-16 RX ORDER — FLUMAZENIL 0.1 MG/ML
0.2 INJECTION INTRAVENOUS
Status: DISCONTINUED | OUTPATIENT
Start: 2020-01-16 | End: 2020-01-16 | Stop reason: HOSPADM

## 2020-01-16 RX ORDER — METHYLPREDNISOLONE ACETATE 40 MG/ML
INJECTION, SUSPENSION INTRA-ARTICULAR; INTRALESIONAL; INTRAMUSCULAR; SOFT TISSUE AS NEEDED
Status: DISCONTINUED | OUTPATIENT
Start: 2020-01-16 | End: 2020-01-16 | Stop reason: HOSPADM

## 2020-01-16 RX ORDER — MIDAZOLAM HYDROCHLORIDE 1 MG/ML
2 INJECTION, SOLUTION INTRAMUSCULAR; INTRAVENOUS
Status: DISCONTINUED | OUTPATIENT
Start: 2020-01-16 | End: 2020-01-16 | Stop reason: HOSPADM

## 2020-01-16 RX ORDER — DEXAMETHASONE SODIUM PHOSPHATE 4 MG/ML
INJECTION, SOLUTION INTRA-ARTICULAR; INTRALESIONAL; INTRAMUSCULAR; INTRAVENOUS; SOFT TISSUE AS NEEDED
Status: DISCONTINUED | OUTPATIENT
Start: 2020-01-16 | End: 2020-01-16 | Stop reason: HOSPADM

## 2020-01-16 RX ORDER — EPHEDRINE SULFATE/0.9% NACL/PF 50 MG/5 ML
SYRINGE (ML) INTRAVENOUS AS NEEDED
Status: DISCONTINUED | OUTPATIENT
Start: 2020-01-16 | End: 2020-01-16 | Stop reason: HOSPADM

## 2020-01-16 RX ORDER — DIPHENHYDRAMINE HYDROCHLORIDE 50 MG/ML
12.5 INJECTION, SOLUTION INTRAMUSCULAR; INTRAVENOUS AS NEEDED
Status: DISCONTINUED | OUTPATIENT
Start: 2020-01-16 | End: 2020-01-16 | Stop reason: HOSPADM

## 2020-01-16 RX ADMIN — FENTANYL CITRATE 50 MCG: 50 INJECTION, SOLUTION INTRAMUSCULAR; INTRAVENOUS at 13:57

## 2020-01-16 RX ADMIN — FENTANYL CITRATE 50 MCG: 50 INJECTION, SOLUTION INTRAMUSCULAR; INTRAVENOUS at 14:09

## 2020-01-16 RX ADMIN — MIDAZOLAM 2 MG: 1 INJECTION INTRAMUSCULAR; INTRAVENOUS at 13:57

## 2020-01-16 RX ADMIN — SODIUM CHLORIDE, SODIUM LACTATE, POTASSIUM CHLORIDE, AND CALCIUM CHLORIDE 125 ML/HR: 600; 310; 30; 20 INJECTION, SOLUTION INTRAVENOUS at 12:39

## 2020-01-16 RX ADMIN — SODIUM CHLORIDE, POTASSIUM CHLORIDE, SODIUM LACTATE AND CALCIUM CHLORIDE: 600; 310; 30; 20 INJECTION, SOLUTION INTRAVENOUS at 13:55

## 2020-01-16 RX ADMIN — Medication 10 MG: at 14:19

## 2020-01-16 RX ADMIN — CEFAZOLIN 2 G: 1 INJECTION, POWDER, FOR SOLUTION INTRAMUSCULAR; INTRAVENOUS at 14:09

## 2020-01-16 RX ADMIN — KETOROLAC TROMETHAMINE 30 MG: 30 INJECTION INTRAMUSCULAR; INTRAVENOUS at 14:46

## 2020-01-16 RX ADMIN — LIDOCAINE HYDROCHLORIDE 60 MG: 20 INJECTION, SOLUTION EPIDURAL; INFILTRATION; INTRACAUDAL; PERINEURAL at 14:01

## 2020-01-16 RX ADMIN — FENTANYL CITRATE 50 MCG: 50 INJECTION, SOLUTION INTRAMUSCULAR; INTRAVENOUS at 14:15

## 2020-01-16 RX ADMIN — HYDROMORPHONE HYDROCHLORIDE 0.5 MG: 1 INJECTION, SOLUTION INTRAMUSCULAR; INTRAVENOUS; SUBCUTANEOUS at 15:13

## 2020-01-16 RX ADMIN — FENTANYL CITRATE 50 MCG: 50 INJECTION, SOLUTION INTRAMUSCULAR; INTRAVENOUS at 13:59

## 2020-01-16 RX ADMIN — ONDANSETRON 4 MG: 2 INJECTION INTRAMUSCULAR; INTRAVENOUS at 13:59

## 2020-01-16 RX ADMIN — PROPOFOL 200 MG: 10 INJECTION, EMULSION INTRAVENOUS at 14:01

## 2020-01-16 RX ADMIN — DEXAMETHASONE SODIUM PHOSPHATE 8 MG: 4 INJECTION, SOLUTION INTRAMUSCULAR; INTRAVENOUS at 14:05

## 2020-01-16 NOTE — DISCHARGE INSTRUCTIONS
DR. Taqueria Sheridan    POST OPERATIVE INSTRUCTIONS FOLLOWING ARTHROSCOPIC SURGERY OF KNEE  In order to continue your care at home, please follow these guidelines from Dr. Yariel Chavez. **Please do not drive, operate any machinery, or make important business decisions for twenty four hours after surgery due to the medications you have received. 1. First meal at home should be clear liquids and return to regular diet as tolerated. 2. An Active ice wrap is provided for your knee. Re-freeze it in your home freezer a necessary. Wear it for the first 2-3 days to decrease swelling and pain, then use as desired. 3. Elevate the surgical leg when sitting and sleeping to reduce swelling. DO NOT place a pillow directly under the knee, place it under your leg. 4. Practice quadriceps muscle tightening and straight leg raise exercises and ankle pumps several times each hour. Bend your knee as often as you need to for comfort. Remove the ice wrap to bend the knee at least 3 times daily. 5. Let pain be your guide to activity; too much pain, too much activity. 6. You will be given crutches in the hospital to use until you see Dr. Yariel Chavez. Begin weight bearing as tolerated with 2 crutches. Instructions:  Crutches, bad leg, good leg, flat foot. 7. A prescription will be provided for pain medication before leaving the office. An antiinflammatory may also be prescribed. Use Tylenol for headaches. AVOID aspirin and ibuprofen or other NSAID use. Please inform us of any known drug allergies. 8. You may shower 48 hours after surgery. The dressing may be removed after 48 hours. Cover the small incisions with band-aids. Change band-aids after your shower daily. DO NOT remove the steri-strips that cover your sutures if you have an open incision. 9. You have been given a follow-up appointment date. If this is not feasible or you have forgotten, please call the office the day after surgery to obtain an appointment.   DO NOT swim, take a tub bath, or whirlpool, or immerse your wound until your first post operative check in the office. 10. 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 11. If you develop a fever greater than 101 degrees, unexpected redness, or swelling in your lower extremity please call the office immediately. DISCHARGE SUMMARY from Your Nurse    PATIENT INSTRUCTIONS:    AFTER ANESTHESIA & SEDATION, and WHILE TAKING PAIN MEDICINE  After general anesthesia / intravenous sedation and the 24 hours following, and/or while taking prescription Opiates:  · Limit your activities  · Do not drive and operate hazardous machinery until you have been of all narcotics and sedatives for over 24 hours  · Do not make important personal or business decisions  · Do  not drink alcoholic beverages  · If you have not urinated within 8 hours after discharge, please contact your surgeon on call. SIGNS OF INFECTION  Report the following Signs of Infection or General Problems after surgery to your surgeon:  · Excessive pain, swelling, redness or odor of or around the surgical area  · Temperature over 101; Temperature over 100 if on medications that affect your ability to fight infections  · Nausea and vomiting lasting longer than 4 hours or if unable to take medications  · Any signs of decreased circulation or nerve impairment to extremity: change in color, persistent  numbness, tingling, coldness or increase pain  · If you have any questions. COUGH AND DEEP BREATHE  Breathing deep and coughing are very important exercises to do after surgery. Deep breathing and coughing open the little air tubes and air sacks in your lungs. You take deep breaths every day. You may not even notice - it is just something you do when you sigh or yawn. It is a natural exercise you do to keep these air passages open. After surgery, take deep breaths and cough, on purpose.       Coughing and deep breathing help prevent bronchitis and pneumonia after surgery. If you had chest or belly surgery, use a pillow as a \"hug jelani\" and hold it tightly to your chest or belly when you cough. DIRECTIONS:  12. Take 10 to 15 slow deep breaths every hour while awake. 13. Breathe in deeply, and hold it for 2 seconds. 14. Exhale slowly through puckered lips, like blowing up a balloon. 15. After every 4th or 5th deep breath, hug your pillow to your chest or belly and give a hard, deep cough. Yes, it will probably hurt. But doing this exercise is very important part of healing after surgery. Take your pain medicine to help you do this exercise without too much pain. ANKLE PUMPS    Ankle pumps increase the circulation of oxygenated blood to your lower extremities and decrease your risk for circulation problems such as blood clots. They also stretch the muscles, tendons and ligaments in your foot and ankle, and prevent joint contracture in the ankle and foot, especially after surgeries on the legs. It is important to do ankle pump exercises regularly after surgery because immobility increases your risk for developing a blood clot. Your doctor may also have you take an Aspirin for the next few days as well. If your doctor did not ask you to take an Aspirin, consult with him before starting Aspirin therapy on your own. The exercise is quite simple. · Slowly point your foot forward, feeling the muscles on the top of your lower leg stretch, and hold this position for 5 seconds. · Next, pull your foot back toward you as far as possible, stretching the calf muscles, and hold that position for 5 seconds. · Repeat with the other foot. · Perform 10 repetitions every hour while awake for both ankles if possible (down and then up with the foot once is one repetition). You should feel gentle stretching of the muscles in your lower leg when doing this exercise.   If you feel pain, or your range of motion is limited, don't push too hard. Only go the limit your joint and muscles will let you go. If you have increasing pain, progressively worsening leg warmth or swelling, STOP the exercise and call your doctor. Other Wound Care information:  [] No additional recommendations. P.R.I.C.E. INSTRUCTIONS    PRICE is an acronym that stands for Protect, Rest, Ice, Compression, and Elevation (sometimes you might see the acronym RICE.)   Listed below are five activities one can do for an injured limb or soft tissue injury. While much anecdotal understanding learned through many years of experience supports these seemingly common sense treatments, building scientific evidence is showing how and why these treatment principles are proving to be so beneficial.  Below is a breakdown of what the PRICE principles entail to speed healing along. PROTECT may sound like an obvious thing to do, and really, it is common sense. After an injury or surgery, protecting the site that hurts help to prevent further injury. REST is essential for an injured limb. Like protection, the more you are up on an injured limb, especially in the early stages of an injury, the more damage you can do. Rest means no activities that would involve the use of the injured tissues so that the early stages of healing can begin without  interruption. ICE \"is perhaps the simplest and oldest [therapy] in the treatment of soft tissue injuries. \"4    Ice help decrease swelling in inflamed and damaged tissues, can diminish the feeling of pain and decrease muscle spasms, and, immediately after an injury,  can slow cellular metabolism and help to prevent further tissue injury from oxygen starvation caused by the swelling. 5      COMPRESSION help decrease pain by limiting movement of an injured limb.   Compression can be found through the use of an elastic wrap bandage, a cast, splint, or simply a snug cooling cuff or an ice pack and pillow. ELEVATION is a very important intervention. Placing the injury above the level of the heart whenever possible helps decrease swelling by using gravity to one's advantage . Placing the injury above the level of the heart also helps prevent, or at least decrease, the throbbing pain that is sometimes experienced after surgery or injury. Sources:  1. Muscle injuries: optimizing recovery (2007) Best Practice & Research Clinical Rheumatology Vol. 21, No. 2, pp 378-911, Accessed 9/26/11    2. PRICE first aid guidelines - Protection, Rest, Ice, Compression and Elevation By Kuldip Conrad, About. com Guide, Updated March 27, 2011, Accessed 9/26/11 [email or web address]://[email or web address].com/cs/rehab/a/rice. htm    3. Rest Ice Compression Elevation: RICE for injuries, Accessed 9/26/11 [employer]. com/rest-ice-compression-elevation. html    4. The use of ice in the treatment of acute soft-tissue injury (2004) [place], Vol. 32, No. 1, pp 251-261, Accessed 9/26/11 http://ajs.[employer]/content/32/1/251.full.pdf+html    5. Soft tissue damage and healing; theory and techniques, www.iaaf.org, Ch. 9 of  medical page, by radha Lopes And Gennaro Bhakta 9/27/11 [facility].gl. pdf                       Below is information on the medication(s) your doctor is prescribing for you: The maximum daily dose of acetaminophen was discussed with the patient. She was encouraged not to exceed 3,000 mg of acetaminophen during a 24 hour period and was asked to keep in mind that acetaminophen can also be found in many over-the-counter cold medications as well as narcotics that may be given for pain. The patient expresses understanding of these issues and questions were answered. 4 THINGS ABOUT PAIN MEDICINE I ALWAYS TALK ABOUT:  There are 4 side effects I always talk about for pain medications.     1. They make you sleepy and drowsy. Do not drive a car or operate machines while taking pain medication. Do not make any major decisions. Take a nap. Relax. Let your body recover from the affects of anesthesia and surgery. 2. Some people have quite a problem with itching and. ..  3. Nausea or vomiting. I mention these together because research tends to suggest there is a gene-related issue. While some have a hard time with these problems, others do not. These are expected and know side effects. Over-the-counter Benadryl® (the drug name is diphenhydramine) can help with the itching. Your doctor may also have given you some medicine for nausea. IF HE DID NOT, CALL HIM/HER. 4. Last but not least is the problem of constipation (not vanessa able to have a bowel movement - poop.)  All pain medicine can slow down the movement of food through the gut. The slower it goes, the worse it can be. Frankly, this means hard poop adding insult to the injury of surgery. And if you had tummy surgery, like having your gall bladder removed or a hernia repair, YOU DO NOT WANT THIS PROBLEM. There are 4 things I recommend. · Drink lots of fluids. For healthy people with no heart problems, this means at least 64 ounces of liquids or more per day. For example, a Big Gulp® from [employer] is 32 ounces. So you need to drink at least 2  Big Gulp®'s of fluids every day. If you have heart problems you may not be able to do this. Talk to your doctor about what you should do to prevent constipation. · Drinking fruit juice like apple, pear, or prune juice gives you extra \"BANG\" for your beverage. These drinks are high in natural fiber. If you are a diabetic, drink sugar-free fluids with fiber additives (see next 2 points.)  Avoid drinking extra fruit juice unless this is a regular part of your diet plan. · Eat extra fresh fruits and vegetables. · Add extra fiber-products.   Fiber products like Metamucil®, Citrucel®, Miralax® or Benefiber® can help. These products are over-the-counter and you do not need a prescription from your doctor. If you have followed these recommendations and still have some difficulty having a good poop, take and over-the-counter stimulant like Dulcolax® (biscodyl)  or Senokot® (senna concentrate). These may help get things moving. Abhishek Proctor MEDICATION AND   SIDE EFFECT GUIDE    The [place] MEDICATION AND SIDE EFFECT GUIDE was provided to the PATIENT AND CARE PROVIDER. Information provided includes instruction about drug purpose and common side effects for the following medications:   · NONE - Rx already provided      Medication information added to discharge record on January 16, 2020 at 3:09 PM.      Some information we wish all of our patients to be familiar with and General Information for Healthy Lifestyle choices:    · Make a list of your current medications with your Primary Care Provider. · Update this list whenever your medications are discontinued, doses are changed, or new medications (including over-the-counter products like ibuprofen, vitamins, or herbal remedies) are added. · Carry medication information at all times in case of emergency situations      No smoking / No tobacco products / Avoid exposure to second hand smoke    Surgeon General's Warning:  Quitting smoking now greatly reduces serious risk to your health. Obesity, smoking, and sedentary lifestyle greatly increases your risk for illness. A healthy diet, regular physical exercise & weight monitoring are important for maintaining a healthy lifestyle. A Note About Congestive Heart Failure:   You may be retaining fluid if you have a history of heart failure or if you experience any of the following symptoms:      · Weight gain of 3 pounds or more overnight or 5 pounds in a week  · Increased swelling in our hands or feet  · Shortness of breath while lying flat in bed      Please call your doctor as soon as you notice any of these symptoms; do not wait until your next office visit. A Note About Strokes:  Recognize signs and symptoms of STROKE. The simple mnemonic, F.A.S.T., can help you remember signs of a stroke and what to do if you suspect a stroke is occuring to you or someone you are with:    F - Face looks uneven  A - Arms unable to move, or move evenly  S - Speech is slurred or non-existent  T - Time - CALL 911 as soon as signs and symptoms begin - DO NOT go to bed or wait to see if you get better - TIME IS BRAIN. Warning Signs of HEART ATTACK   Call 911 if you have these symptoms:     Chest discomfort. Most heart attacks involve discomfort in the center of the chest that lasts more than a few minutes, or that goes away and comes back. It can feel like uncomfortable pressure, squeezing, fullness, or pain.  Discomfort in other areas of the upper body. Symptoms can include pain or discomfort in one or both arms, the back, neck, jaw, or stomach.  Shortness of breath with or without chest discomfort.  Other signs may include breaking out in a cold sweat, nausea, or lightheadedness. Don't wait more than five minutes to call 911 - MINUTES MATTER! Fast action can save your life. Calling 911 is almost always the fastest way to get lifesaving treatment. Emergency Medical Services staff can begin treatment when they arrive -- up to an hour sooner than if someone gets to the hospital by car. AT THE COMPLETION OF DISCHARGE INSTRUCTION REVIEW, WE VERIFY:  The discharge information has been reviewed with the patient and caregiver. Questions have been asked and answered meeting patient and caregiver expectations. The patient and caregiver verbalized understanding.     Your discharge nurse was Violetta Ziegler RN-BC       Board Certified - Pain Management      CONTENTS FOUND IN YOUR DISCHARGE ENVELOPE:  [x]     Surgeon and Hospital Discharge Instructions  [x]     [facility] Care Provider Card  [x] [facility] Infection Prevention Handout  []     Medication Prescription(s) x 0 ( [] These have been sent electronically to your pharmacy by your surgeon,   - OR -   your surgeon has already provided these to you during a previous/pre-op office visit)  []     Physical Therapy Prescription  []     Follow-up Appointment Cards  []     Surgery-related Pictures/Media  []     Pain block and/or block with On-Q Catheter from Anesthesia Service (information included in your instructions above)  []     Medical device information sheets/pamphlets from their    []     School/work excuse note. []     /parent work excuse note.       The following personal items collected during your admission for safe keeping are returned to you:     Dental Appliance: Dental Appliances: None  Vi tiffany:    Hearing Aid:    Jewelry: Jewelry: Ring( wedding ring)  Clothing: Clothing: Pants, Undergarments, Shirt, Footwear, Socks  Other Valuables:    Valuables sent to safe:

## 2020-01-16 NOTE — ANESTHESIA PREPROCEDURE EVALUATION
Relevant Problems   No relevant active problems       Anesthetic History   No history of anesthetic complications            Review of Systems / Medical History  Patient summary reviewed, nursing notes reviewed and pertinent labs reviewed    Pulmonary  Within defined limits                 Neuro/Psych   Within defined limits           Cardiovascular  Within defined limits                Exercise tolerance: >4 METS     GI/Hepatic/Renal  Within defined limits              Endo/Other  Within defined limits           Other Findings   Comments: Chronic bronchitis           Physical Exam    Airway  Mallampati: I    Neck ROM: normal range of motion   Mouth opening: Normal     Cardiovascular  Regular rate and rhythm,  S1 and S2 normal,  no murmur, click, rub, or gallop  Rhythm: regular  Rate: normal         Dental  No notable dental hx       Pulmonary  Breath sounds clear to auscultation               Abdominal  GI exam deferred       Other Findings            Anesthetic Plan    ASA: 2  Anesthesia type: general          Induction: Intravenous  Anesthetic plan and risks discussed with: Patient

## 2020-01-16 NOTE — PERIOP NOTES
PACU IN REPORT FROM ANESTHESIA    Verbal report received from   [facility]   [] MD Anesthesiologist    [x] CRNA   [] with student    CHOICE ANESTHESIA:  [x] GENERAL  [] MAC  [] LOCAL  [] REGIONAL  [] SPINAL   **Note the anesthesia record for medications given intraoperatively. **    SURGICAL PROCEDURE: Procedure(s) (LRB):  LEFT KNEE ARTHROSCOPY, SYNOVECTOMY, REMOVAL OF MULTIPLE LOOSE BODIES (Left)     SURGEON: Mendez Cruz MD.    Brief Initial Visual Assessment:    Patient Age: [] Infant(1-12mo)      []Pediatric(1-13yrs)    [] Adolescent(13-18yrs)    [x] Adult(18-65yrs)      []Geriatric Adult(>65yrs). Patient    [x] Alert           []Calm & Cooperative      [] Anxious  Appearance: [x] Drowsy      [] Sedated      [] Unresponsive     Oriented x  3              Airway:     [x] Patent                          [] Near-obstructed   [] Obstructed                        [] Airway improved with head/airway repositioning                       Airway Adjuncts Present: [] Oral Airway    [] Nasal Trumpet    [] ETT    [] LMA            Respiratory  [x] Even      [] Labored      [] Shallow  Pattern:    [x] Non-Labored  [] VENT and/or respiratory assistance     being provided. Skin:     [x] Pink [] Dusky    [] Pale        [x] Warm    [] Hot [] Cool        [] Cold   [x]Dry [] Moist [] Diaphoretic     Membranes:  [x] Pink [] Pale       [x] Moist [] Dry     [] Crusty     Pain:   [x] No Acute Discomfort. 3    /10 Scale [x] Verbal Numeric   [] Moderate Discomfort.      [] V.A.S. [] Acute Discomfort.                [] A.N.V.    [] Chronic-Issue Related Discomfort.   [] F.L.A.C.C. Note E-MAR for medications administered. []Faces, [place]/Baker    Note assessments documented in flowsheets; any assessment variants to be found in comments or narrative perioperative nurse notes.        Post-anesthesia care now assumed by [facility] BSN, RN-BC

## 2020-01-16 NOTE — ANESTHESIA POSTPROCEDURE EVALUATION
Procedure(s):  LEFT KNEE ARTHROSCOPY, SYNOVECTOMY, REMOVAL OF MULTIPLE LOOSE BODIES. general    Anesthesia Post Evaluation      Multimodal analgesia: multimodal analgesia not used between 6 hours prior to anesthesia start to PACU discharge  Patient location during evaluation: PACU  Patient participation: complete - patient participated  Level of consciousness: awake  Pain management: adequate  Airway patency: patent  Anesthetic complications: no  Cardiovascular status: acceptable, blood pressure returned to baseline and hemodynamically stable  Respiratory status: acceptable  Hydration status: acceptable  Post anesthesia nausea and vomiting:  controlled      Vitals Value Taken Time   /84 1/16/2020  3:35 PM   Temp 36.9 °C (98.4 °F) 1/16/2020  3:00 PM   Pulse 93 1/16/2020  3:38 PM   Resp 17 1/16/2020  3:38 PM   SpO2 97 % 1/16/2020  3:38 PM   Vitals shown include unvalidated device data.

## 2020-01-16 NOTE — BRIEF OP NOTE
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, MULTIPLE LOOSE BODIES LEFT KNEE    Procedure(s):  LEFT KNEE ARTHROSCOPY, SYNOVECTOMY, REMOVAL OF MULTIPLE LOOSE BODIES  Surgeon(s) and Role:     Stormy Pepe MD - Primary         Surgical Assistant:SA Jef Mahmood    Surgical Staff:  Circ-1: Rafaela Shannon RN  Scrub Tech-1: Sri Cortez  Surg Asst-1: Jef Garcia  Event Time In Time Out   Incision Start 1418    Incision Close       Anesthesia: General   Estimated Blood Loss: less than 10 cc  Specimens: loose bodies sent for pathology for synovial chondromatosis  Findings: greater than 30 loose bodies    Complications: none  Implants: * No implants in log *

## 2020-01-16 NOTE — PROGRESS NOTES
POST ANESTHESIA CARE    DISCHARGE / TRANSFER NOTE    Brandt Koch was   discharged     via  wheel chair     to  private vehicle    . Patient was escorted by   nurse    . Patient verbalized   appreciation and was very pleased with care received   throughout their stay. Patient was discharged in     pleasant mood  . Pain at discharge/transfer was      0    /10 at rest.    Discharge, medication and follow-up instructions were verbalized as understood prior to discharge  (if applicable for same-day procedures being discharged.)    All personal belongings have been returned to patient, and patient/family verbally confirm receiving belongings as all present.     Signed: Noelle DUNCANN RN-BC

## 2020-01-17 NOTE — OP NOTES
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 PROCEDURES PERFORMED:  Left knee arthroscopic partial synovectomy and removal of multiple loose bodies (35). SURGEON:  Paradise Jesus MD    ASSISTANT: none    ANESTHESIA:  General.    COMPLICATIONS:  none. SPECIMENS REMOVED:  35 loose bodies. IMPLANTS:  none. ESTIMATED BLOOD LOSS:  none. PROCEDURE:  The patient's left knee was prepped and draped in sterile manner in the OR after general anesthesia. When she was first seen in the preoperative holding area by the surgeon, the left knee was marked. The same knee have had multiple almost  loose bodies removed after four or five years ago. She was pregnant and things rolled up again and started to have more locking and we decided in turn to move forward and remove some more with no evidence of arthritis. The patient's knee was prepped and draped in the usual sterile manner and the scope placed in the inferolateral portal with tourniquet inflation. The medial and lateral joints were pristine with normal menisci and no obvious loose bodies. The intercondylar notch had two loose bodies which we removed through the anterior medial portal but most loose bodies were in the two lateral and medial gutter and the suprapatellar pouch. We sequentially removed approximately 35 loose bodies and then performed some limited more synovectomy and irrigated copiously. We had to make a larger capsular incision to remove the loose bodies. So we closed the capsule with 2-0 Monocryl followed by skin closure with sutures.   We placed Xylocaine and cortisone in for postop anesthesia and effusion minimization. The patient tolerated the procedure well and went to recovery room in good condition with no complications.       Nicolas Aguilera MD      [id number]  D:  01/16/2020 14:56  T:  01/16/2020 23:52  JOB #:  [id number]
